# Patient Record
Sex: MALE | Race: WHITE | NOT HISPANIC OR LATINO | Employment: FULL TIME | URBAN - METROPOLITAN AREA
[De-identification: names, ages, dates, MRNs, and addresses within clinical notes are randomized per-mention and may not be internally consistent; named-entity substitution may affect disease eponyms.]

---

## 2017-08-31 ENCOUNTER — ALLSCRIPTS OFFICE VISIT (OUTPATIENT)
Dept: OTHER | Facility: OTHER | Age: 47
End: 2017-08-31

## 2017-08-31 DIAGNOSIS — Z91.89 OTHER SPECIFIED PERSONAL RISK FACTORS, NOT ELSEWHERE CLASSIFIED: ICD-10-CM

## 2017-08-31 DIAGNOSIS — R05.9 COUGH: ICD-10-CM

## 2017-09-03 ENCOUNTER — HOSPITAL ENCOUNTER (OUTPATIENT)
Dept: CT IMAGING | Facility: HOSPITAL | Age: 47
Discharge: HOME | End: 2017-09-03
Payer: COMMERCIAL

## 2017-09-03 DIAGNOSIS — R05.9 COUGH: ICD-10-CM

## 2017-09-03 DIAGNOSIS — Z91.89 OTHER SPECIFIED PERSONAL RISK FACTORS, NOT ELSEWHERE CLASSIFIED: ICD-10-CM

## 2017-09-06 ENCOUNTER — GENERIC CONVERSION - ENCOUNTER (OUTPATIENT)
Dept: OTHER | Facility: OTHER | Age: 47
End: 2017-09-06

## 2017-11-24 ENCOUNTER — GENERIC CONVERSION - ENCOUNTER (OUTPATIENT)
Dept: OTHER | Facility: OTHER | Age: 47
End: 2017-11-24

## 2017-11-24 DIAGNOSIS — M79.661 PAIN OF RIGHT LOWER LEG: ICD-10-CM

## 2018-01-10 NOTE — PROGRESS NOTES
Assessment    1  Encounter for preventive health examination (V70 0) (Z00 00)   2  Anxiety and depression (300 00,311) (F41 9,F32 9)    Plan  Abnormal blood sugar    · (1) CBC/PLT/DIFF; Status:Active; Requested for:56Mcp7913; Abnormal blood sugar, Benign essential hypertension, Dyslipidemia, Hypothyroidism    · (1) HEMOGLOBIN A1C; Status:Active; Requested for:07Fwp3307; Anxiety and depression    · Decreasing the stress in your life may help your condition improve ; Status:Complete;    Done: 92MUX3399   · There are many things you can do to help control and end a panic attack ;  Status:Complete;   Done: 56KTQ3802  Benign essential hypertension    · Metoprolol Succinate ER 50 MG Oral Tablet Extended Release 24 Hour; TAKE ONE  TABLET BY MOUTH ONCE DAILY  Health Maintenance    · There are ways to decrease your stress and improve your sense of well-being  We  encourage you to keep active and exercise regularly  Make time to take care of yourself  and participate in activities that you enjoy  Stay connected to friends and family that can  support and comfort you  If at any time you have thoughts of harming yourself or  someone else, contact us immediately ; Status:Active; Requested for:82Ehh9947;    · We encourage all of our patients to exercise regularly  30 minutes of exercise or physical  activity five or more days a week is recommended for children and adults ;  Status:Complete;   Done: 26SWZ9185   · We encourage you to begin to make lifestyle changes to help control your blood  pressure    These may include losing weight, increasing your activity level, limiting salt in  your diet, decreasing alcohol intake, and eating a diet low in fat and rich in fruits  and vegetables ; Status:Complete;   Done: 25ILN5821   · Follow-up visit in 2 weeks Evaluation and Treatment  Follow-up  Status: Hold For -  Scheduling  Requested for: 91QTH3680   · Stop: Fluzone Quadrivalent Intramuscular Suspension  Hypothyroidism    · Levothyroxine Sodium 50 MCG Oral Tablet; 1 po q daily in am empty stomach    Discussion/Summary  Impression: health maintenance visit  Currently, he eats a poor diet and has an inadequate exercise regimen  Prostate cancer screening: the risks and benefits of prostate cancer screening were discussed and PSA is not indicated  Testicular cancer screening: the risks and benefits of testicular cancer screening were discussed and monthly self testicular exam was advised  Colorectal cancer screening: colorectal cancer screening is not indicated  The patient declines immunizations  Advice and education were given regarding nutrition, aerobic exercise, weight loss, calcium supplements, vitamin D supplements and cardiovascular risk reduction  Patient will follow-up in 2-3 weeks for review of labs  He was offered medications for symptoms of anxiety and depression, but patient states that this is situational and he does not want to be on any medications as of now  He will consider medications if his symptoms persist  He was encouraged to eat healthy and doing exercise on a regular basis for at least 30 minutes per day, this will help with his blood pressure control, and his mood  His blood pressure is elevated today, so I advised him to increase metoprolol to 50 mg daily  Possible side effects of new medications were reviewed with the patient/guardian today  The patient was counseled regarding instructions for management, risk factor reductions, patient and family education, impressions, importance of compliance with treatment  Chief Complaint  preventative      History of Present Illness  HM, Adult Male: The patient is being seen for a health maintenance evaluation  The last health maintenance visit was 2 year(s) ago  Social History: Household members include spouse  General Health: The patient's health since the last visit is described as fair  He has regular dental visits  He denies vision problems   He denies hearing loss  Immunizations status:  refuses flu vaccine  Lifestyle:  He consumes a diverse and healthy diet  He has weight concerns  He does not exercise regularly  Screening: Prostate cancer screening includes no previous evaluation  Testicular cancer screening includes monthly self testicular examinations  Colorectal cancer screening includes no previous screening  metabolic screening reviewed and current  HPI: Patient is here for annual physical  He is emotionally upset because he is going through a divorce  He recently lost his father, and states that his mother is suffering from lung cancer  Patient is due for labs, but is requesting a short supply of the medication to be called into his local pharmacy, since he is low on the medications  Review of Systems    Constitutional: as noted in HPI  Eyes: No complaints of eye pain, no red eyes, no discharge from eyes, no itchy eyes  ENT: no complaints of earache, no hearing loss, no nosebleeds, no nasal discharge, no sore throat, no hoarseness  Cardiovascular: No complaints of slow heart rate, no fast heart rate, no chest pain, no palpitations, no leg claudication, no lower extremity  Respiratory: No complaints of shortness of breath, no wheezing, no cough, no SOB on exertion, no orthopnea or PND  Gastrointestinal: No complaints of abdominal pain, no constipation, no nausea or vomiting, no diarrhea or bloody stools  Genitourinary: No complaints of dysuria, no incontinence, no hesitancy, no nocturia, no genital lesion, no testicular pain  Integumentary: No complaints of skin rash or skin lesions, no itching, no skin wound, no dry skin  Neurological: No compliants of headache, no confusion, no convulsions, no numbness or tingling, no dizziness or fainting, no limb weakness, no difficulty walking  Psychiatric: as noted in HPI     Endocrine: No complaints of proptosis, no hot flashes, no muscle weakness, no erectile dysfunction, no deepening of the voice, no feelings of weakness  Hematologic/Lymphatic: No complaints of swollen glands, no swollen glands in the neck, does not bleed easily, no easy bruising  Active Problems    1  Benign essential hypertension (401 1) (I10)   2  Dyslipidemia (272 4) (E78 5)   3  Hypothyroidism (244 9) (E03 9)   4  Irritable bowel syndrome (564 1) (K58 9)   5  Nasal congestion (478 19) (R09 81)   6  Obesity (278 00) (E66 9)   7  Well adult on routine health check (V70 0) (Z00 00)    Past Medical History    · History of Acute reaction to stress (308 9) (F43 0)   · Acute upper respiratory infection (465 9) (J06 9)   · History of Blood pressure elevated (401 9) (I10)   · History of acute bronchitis (V12 69) (Z87 09)   · History of Nonspecific abnormal results of function study of thyroid (794 5) (R94 6)   · History of Special screening examination for neoplasm of prostate (V76 44) (Z12 5)    Family History  Mother    · Family history of Emphysema   · Family history of Lung Cancer (V16 1)  Father    · Family history of Dementia   · Family history of Hyperlipidemia   · Family history of Hypertension (V17 49)   · Family history of Type 2 Diabetes Mellitus  Sister    · Family history of Thyroid Cancer    Social History    · Being A Social Drinker   · Denied: History of Drug Use   · Never A Smoker    Current Meds   1  Levothyroxine Sodium 50 MCG Oral Tablet; take 1 tablet by mouth every morning; Therapy: 09GXC1040 to (Evaluate:35Abx2384)  Requested for: 46BDD4024; Last   Rx:[id number] Ordered    Allergies    1  No Known Drug Allergies    Vitals   Recorded: 02Dec2016 08:04AM   Heart Rate 70   Respiration 16   Systolic 857   Diastolic 90   Height 5 ft 11 in   Weight 307 lb    BMI Calculated 42 82   BSA Calculated 2 52     Physical Exam    Constitutional   General appearance: No acute distress, well appearing and well nourished      Ears, Nose, Mouth, and Throat   Otoscopic examination: Tympanic membranes translucent with normal light reflex  Canals patent without erythema  Oropharynx: Normal with no erythema, edema, exudate or lesions  Neck   Thyroid: Normal, no thyromegaly  Pulmonary   Auscultation of lungs: Clear to auscultation  Cardiovascular   Auscultation of heart: Normal rate and rhythm, normal S1 and S2, no murmurs  Examination of extremities for edema and/or varicosities: Normal     Lymphatic   Palpation of lymph nodes in neck: No lymphadenopathy  Neurologic   Cortical function: Normal mental status  Coordination: Normal finger to nose and heel to shin  Psychiatric   Orientation to person, place and time: Normal     Mood and affect: Normal        Results/Data  PHQ-2 Adult Depression Screening [id number] 08:11AM User, [employer]     Test Name Result Flag Reference   PHQ-2 Adult Depression Score 1     Over the last two weeks, how often have you been bothered by any of the following problems?   Little interest or pleasure in doing things: Not at all - 0  Feeling down, depressed, or hopeless: Several days - 1   PHQ-2 Adult Depression Screening Negative         Procedure    Procedure:   Results: 20/20 in the right eye with corrective device, 20/20 in the left eye with corrective device      Future Appointments    Date/Time Provider Specialty Site   12/22/2016 05:40 PM [facility]   Electronically signed by : Rina Epperson MD; Dec  2 2016 10:33AM EST                       (Author)

## 2018-01-10 NOTE — RESULT NOTES
Verified Results  (1) CBC/PLT/DIFF [id number] 08:51AM Tena Hammond     Test Name Result Flag Reference   WHITE BLOOD CELL COUNT 6 4 Thousand/uL  3 8-10 8   RED BLOOD CELL COUNT 4 66 Million/uL  4 20-5 80   HEMOGLOBIN 14 2 g/dL  13 2-17 1   HEMATOCRIT 42 7 %  38 5-50 0   MCV 91 6 fL  80 0-100 0   MCH 30 5 pg  27 0-33 0   MCHC 33 3 g/dL  32 0-36 0   RDW 12 9 %  11 0-15 0   PLATELET COUNT 460 Thousand/uL  140-400   MPV 8 6 fL  7 5-11 5   ABSOLUTE NEUTROPHILS 4026 cells/uL  6130-9799   ABSOLUTE LYMPHOCYTES 1907 cells/uL  850-3900   ABSOLUTE MONOCYTES 294 cells/uL  200-950   ABSOLUTE EOSINOPHILS 141 cells/uL     ABSOLUTE BASOPHILS 32 cells/uL  0-200   NEUTROPHILS 62 9 %     LYMPHOCYTES 29 8 %     MONOCYTES 4 6 %     EOSINOPHILS 2 2 %     BASOPHILS 0 5 %       (1) LIPID PANEL, FASTING [id number] 08:51AM Tena Hammond     Test Name Result Flag Reference   CHOLESTEROL, TOTAL 173 mg/dL  125-200   HDL CHOLESTEROL 29 mg/dL L > OR = 40   TRIGLICERIDES 432 mg/dL  <150   LDL-CHOLESTEROL 121 mg/dL (calc)  <130   Desirable range <100 mg/dL for patients with CHD or  diabetes and <70 mg/dL for diabetic patients with  known heart disease  CHOL/HDLC RATIO 6 0 (calc) H < OR = 5 0   NON HDL CHOLESTEROL 144 mg/dL (calc)     Target for non-HDL cholesterol is 30 mg/dL higher than   LDL cholesterol target  (1) COMPREHENSIVE METABOLIC PANEL [id number] 87:98IX Tena Hammond     Test Name Result Flag Reference   GLUCOSE 88 mg/dL  65-99   Fasting reference interval   UREA NITROGEN (BUN) 23 mg/dL  7-25   CREATININE 0 84 mg/dL  0 60-1 35   eGFR NON-AFR   AMERICAN 104 mL/min/1 73m2  > OR = 60   eGFR AFRICAN AMERICAN 121 mL/min/1 73m2  > OR = 60   BUN/CREATININE RATIO   1-13   NOT APPLICABLE (calc)   SODIUM 140 mmol/L  135-146   POTASSIUM 4 2 mmol/L  3 5-5 3   CHLORIDE 105 mmol/L     CARBON DIOXIDE 31 mmol/L  20-31   CALCIUM 8 8 mg/dL  8 6-10 3   PROTEIN, TOTAL 6 6 g/dL  6 1-8 1   ALBUMIN 3 7 g/dL  3 6-5 1   GLOBULIN 2 9 g/dL (calc)  1 9-3 7   ALBUMIN/GLOBULIN RATIO 1 3 (calc)  1 0-2 5   BILIRUBIN, TOTAL 0 8 mg/dL  0 2-1 2   ALKALINE PHOSPHATASE 77 U/L     AST 18 U/L  10-40   ALT 28 U/L  9-46     (1) T4, FREE [id number] 08:51AM Tena Hammond     Test Name Result Flag Reference   T4, FREE 1 1 ng/dL  0 8-1 8     (Q) TSH, 3RD GENERATION [id number] 08:51AM Tena Hammond     Test Name Result Flag Reference   TSH 3 80 mIU/L  0 40-4 50     (Q) HEMOGLOBIN A1c [id number] 08:51AM Tena Hammond   REPORT COMMENT:  FASTING:YES     Test Name Result Flag Reference   HEMOGLOBIN A1c 5 4 % of total Hgb  <5 7   According to ADA guidelines, hemoglobin A1c <7 0%  represents optimal control in non-pregnant diabetic  patients  Different metrics may apply to specific  patient populations  Standards of Medical Care in  [phone]  Diabetes Care  2013;36:s11-s66     For the purpose of screening for the presence of  diabetes  <5 7%       Consistent with the absence of diabetes  5 7-6 4%    Consistent with increased risk for diabetes              (prediabetes)  >or=6 5%    Consistent with diabetes     This assay result is consistent with a decreased risk  of diabetes  Currently, no consensus exists for use of hemoglobin  A1c for diagnosis of diabetes for children

## 2018-01-13 VITALS
RESPIRATION RATE: 17 BRPM | SYSTOLIC BLOOD PRESSURE: 132 MMHG | DIASTOLIC BLOOD PRESSURE: 84 MMHG | OXYGEN SATURATION: 98 % | WEIGHT: 306 LBS | HEART RATE: 85 BPM | HEIGHT: 71 IN | BODY MASS INDEX: 42.84 KG/M2

## 2018-01-13 NOTE — RESULT NOTES
Verified Results  (1) CBC/PLT/DIFF [id number] 07:37AM Silverio Handy   REPORT COMMENT:  REQ # [id number]  FASTING:YES     Test Name Result Flag Reference   WHITE BLOOD CELL COUNT 5 6 Thousand/uL  3 8-10 8   RED BLOOD CELL COUNT 4 67 Million/uL  4 20-5 80   HEMOGLOBIN 13 9 g/dL  13 2-17 1   HEMATOCRIT 42 4 %  38 5-50 0   MCV 90 8 fL  80 0-100 0   MCH 29 8 pg  27 0-33 0   MCHC 32 8 g/dL  32 0-36 0   RDW 13 0 %  11 0-15 0   PLATELET COUNT 961 Thousand/uL  140-400   MPV 8 1 fL  7 5-11 5   ABSOLUTE NEUTROPHILS 3214 cells/uL  4199-3001   ABSOLUTE LYMPHOCYTES 1926 cells/uL  850-3900   ABSOLUTE MONOCYTES 286 cells/uL  200-950   ABSOLUTE EOSINOPHILS 168 cells/uL     ABSOLUTE BASOPHILS 6 cells/uL  0-200   NEUTROPHILS 57 4 %     LYMPHOCYTES 34 4 %     MONOCYTES 5 1 %     EOSINOPHILS 3 0 %     BASOPHILS 0 1 %       (1) COMPREHENSIVE METABOLIC PANEL [id number] 98:52YF Silverio Handy     Test Name Result Flag Reference   GLUCOSE 88 mg/dL  65-99   Fasting reference interval   UREA NITROGEN (BUN) 18 mg/dL  7-25   CREATININE 0 95 mg/dL  0 60-1 35   eGFR NON-AFR   AMERICAN 95 mL/min/1 73m2  > OR = 60   eGFR AFRICAN AMERICAN 110 mL/min/1 73m2  > OR = 60   BUN/CREATININE RATIO   5-85   NOT APPLICABLE (calc)   SODIUM 138 mmol/L  135-146   POTASSIUM 4 1 mmol/L  3 5-5 3   CHLORIDE 104 mmol/L     CARBON DIOXIDE 26 mmol/L  19-30   CALCIUM 8 9 mg/dL  8 6-10 3   PROTEIN, TOTAL 6 4 g/dL  6 1-8 1   ALBUMIN 3 7 g/dL  3 6-5 1   GLOBULIN 2 7 g/dL (calc)  1 9-3 7   ALBUMIN/GLOBULIN RATIO 1 4 (calc)  1 0-2 5   BILIRUBIN, TOTAL 0 6 mg/dL  0 2-1 2   ALKALINE PHOSPHATASE 70 U/L     AST 19 U/L  10-40   ALT 33 U/L  9-46     (1) LIPID PANEL, FASTING [id number] 07:37AM Silverio Handy     Test Name Result Flag Reference   CHOLESTEROL, TOTAL 159 mg/dL  125-200   HDL CHOLESTEROL 29 mg/dL L > OR = 40   TRIGLICERIDES 773 mg/dL  <150   LDL-CHOLESTEROL 108 mg/dL (calc)  <130   Desirable range <100 mg/dL for patients with CHD or  diabetes and <70 mg/dL for diabetic patients with  known heart disease  CHOL/HDLC RATIO 5 5 (calc) H < OR = 5 0   NON HDL CHOLESTEROL 130 mg/dL (calc)     Target for non-HDL cholesterol is 30 mg/dL higher than   LDL cholesterol target  (Q) MICROALBUMIN, RANDOM URINE (W/CREATININE) [id number] 07:37AM Yogi Ortega     Test Name Result Flag Reference   CREATININE, RANDOM URINE 208 mg/dL     MICROALBUMIN 0 4 mg/dL     Reference Range  Not established   MICROALBUMIN/CREATININE$RATIO, RANDOM URINE 2 mcg/mg creat  <30   The ADA defines abnormalities in albumin  excretion as follows:     Category         Result (mcg/mg creatinine)     Normal                    <30  Microalbuminuria            Clinical albuminuria   > OR = 300     The ADA recommends that at least two of three  specimens collected within a 3-6 month period be  abnormal before considering a patient to be  within a diagnostic category  Plan  Benign essential hypertension, Dyslipidemia, Hypothyroidism    · (1) COMPREHENSIVE METABOLIC PANEL; Status:Active; Requested for:01Nov2016;    · (1) LIPID PANEL, FASTING; Status:Active; Requested for:01Nov2016;    · (1) T4, FREE; Status:Active; Requested for:01Nov2016;    · (1) TSH; Status:Active; Requested for:01Nov2016;     Discussion/Summary   Essentially normal labs  Refill of metoprolol and levothyroxine sent to pharmacy  Please follow-up with Dr Don Colin in 6 months with labs   See order

## 2018-01-14 NOTE — RESULT NOTES
Verified Results  * CT LUNG SCREENING PROGRAM [id number] 01:46PM [place] [facility] Order Number: [id number]    - Patient Instructions: To schedule this appointment, please contact Central Scheduling at [phone]  Test Name Result Flag Reference   CT LUNG SCREENING PROGRAM (Report)     CT CHEST LUNG CANCER SCREENING WITHOUT IV CONTRAST     INDICATION: R05: Cough   Z91 89: Other specified personal risk factors, not elsewhere classified  History taken directly from the electronic ordering system  COMPARISON: None  TECHNIQUE: Unenhanced CT examination of the chest was performed utilizing a low dose protocol  Reformatted images were created in axial, sagittal, and coronal planes  Radiation dose length product (DLP) for this visit: 397 mGy-cm   This examination, like all CT scans performed in the [facility], was performed utilizing techniques to minimize radiation dose exposure, including the use of iterative    reconstruction and automated exposure control  FINDINGS:     LUNGS: There are a few scattered bilateral punctate 1 mm calcified pulmonary nodules  No pulmonary mass  No tracheal or endobronchial lesion  PLEURA: Unremarkable  HEART/GREAT VESSELS: Unremarkable for patient's age  MEDIASTINUM AND FABIAN: Unremarkable  CHEST WALL AND LOWER NECK:    Normal      VISUALIZED STRUCTURES IN THE UPPER ABDOMEN: Unremarkable  OSSEOUS STRUCTURES: No acute fracture  No destructive osseous lesion  IMPRESSION:     A few scattered bilateral 1 mm calcified pulmonary nodules  Lung-RADS: Lung-RADS2, benign appearance or behavior  Continue annual screening with LDCT in 12 months               Workstation performed: [id number]     Signed by:   Adolfo Reinoso MD   9/5/17

## 2018-01-22 VITALS
WEIGHT: 307 LBS | SYSTOLIC BLOOD PRESSURE: 140 MMHG | DIASTOLIC BLOOD PRESSURE: 84 MMHG | BODY MASS INDEX: 42.98 KG/M2 | HEIGHT: 71 IN | HEART RATE: 92 BPM

## 2018-01-31 DIAGNOSIS — I10 ESSENTIAL (PRIMARY) HYPERTENSION: ICD-10-CM

## 2018-01-31 DIAGNOSIS — E03.9 HYPOTHYROIDISM: ICD-10-CM

## 2018-03-08 PROBLEM — F43.22 ADJUSTMENT DISORDER WITH ANXIETY: Status: ACTIVE | Noted: 2017-08-31

## 2018-03-08 RX ORDER — HYDROCHLOROTHIAZIDE 12.5 MG/1
CAPSULE, GELATIN COATED ORAL
COMMUNITY
Start: 2016-12-22 | End: 2018-04-06 | Stop reason: SDUPTHER

## 2018-03-08 RX ORDER — LEVOTHYROXINE SODIUM 0.05 MG/1
1 TABLET ORAL DAILY
COMMUNITY
Start: 2013-06-13 | End: 2018-04-06

## 2018-03-08 RX ORDER — METOPROLOL SUCCINATE 50 MG/1
1 TABLET, EXTENDED RELEASE ORAL DAILY
COMMUNITY
Start: 2016-12-02 | End: 2018-04-06 | Stop reason: SDUPTHER

## 2018-04-04 LAB
ALBUMIN SERPL-MCNC: 4.1 G/DL (ref 3.5–5.5)
ALBUMIN/CREAT UR: <4 MG/G CREAT (ref 0–30)
ALBUMIN/GLOB SERPL: 1.3 {RATIO} (ref 1.2–2.2)
ALP SERPL-CCNC: 82 IU/L (ref 39–117)
ALT SERPL-CCNC: 38 IU/L (ref 0–44)
AMBIG ABBREV DEFAULT: NORMAL
AMBIG ABBREV DEFAULT: NORMAL
AST SERPL-CCNC: 25 IU/L (ref 0–40)
BASOPHILS # BLD AUTO: 0 X10E3/UL (ref 0–0.2)
BASOPHILS NFR BLD AUTO: 0 %
BILIRUB SERPL-MCNC: 0.6 MG/DL (ref 0–1.2)
BUN SERPL-MCNC: 23 MG/DL (ref 6–24)
BUN/CREAT SERPL: 26 (ref 9–20)
CALCIUM SERPL-MCNC: 9 MG/DL (ref 8.7–10.2)
CHLORIDE SERPL-SCNC: 100 MMOL/L (ref 96–106)
CHOLEST SERPL-MCNC: 192 MG/DL (ref 100–199)
CO2 SERPL-SCNC: 27 MMOL/L (ref 18–29)
CREAT SERPL-MCNC: 0.9 MG/DL (ref 0.76–1.27)
CREAT UR-MCNC: 74.9 MG/DL
EOSINOPHIL # BLD AUTO: 0.1 X10E3/UL (ref 0–0.4)
EOSINOPHIL NFR BLD AUTO: 2 %
ERYTHROCYTE [DISTWIDTH] IN BLOOD BY AUTOMATED COUNT: 13.5 % (ref 12.3–15.4)
GLOBULIN SER-MCNC: 3.1 G/DL (ref 1.5–4.5)
GLUCOSE SERPL-MCNC: 97 MG/DL (ref 65–99)
HBA1C MFR BLD: 5.2 % (ref 4.8–5.6)
HCT VFR BLD AUTO: 44.2 % (ref 37.5–51)
HDLC SERPL-MCNC: 33 MG/DL
HGB BLD-MCNC: 15.2 G/DL (ref 13–17.7)
IMM GRANULOCYTES # BLD: 0 X10E3/UL (ref 0–0.1)
IMM GRANULOCYTES NFR BLD: 0 %
LDLC SERPL CALC-MCNC: 121 MG/DL (ref 0–99)
LYMPHOCYTES # BLD AUTO: 1.7 X10E3/UL (ref 0.7–3.1)
LYMPHOCYTES NFR BLD AUTO: 23 %
MCH RBC QN AUTO: 31.1 PG (ref 26.6–33)
MCHC RBC AUTO-ENTMCNC: 34.4 G/DL (ref 31.5–35.7)
MCV RBC AUTO: 90 FL (ref 79–97)
MICROALBUMIN UR-MCNC: <3 UG/ML
MONOCYTES # BLD AUTO: 0.4 X10E3/UL (ref 0.1–0.9)
MONOCYTES NFR BLD AUTO: 5 %
NEUTROPHILS # BLD AUTO: 5 X10E3/UL (ref 1.4–7)
NEUTROPHILS NFR BLD AUTO: 70 %
PLATELET # BLD AUTO: 259 X10E3/UL (ref 150–379)
POTASSIUM SERPL-SCNC: 4.3 MMOL/L (ref 3.5–5.2)
PROT SERPL-MCNC: 7.2 G/DL (ref 6–8.5)
RBC # BLD AUTO: 4.89 X10E6/UL (ref 4.14–5.8)
SL AMB EGFR AFRICAN AMERICAN: 116 ML/MIN/1.73
SL AMB EGFR NON AFRICAN AMERICAN: 100 ML/MIN/1.73
SL AMB VLDL CHOLESTEROL CALC: 38 MG/DL (ref 5–40)
SODIUM SERPL-SCNC: 139 MMOL/L (ref 134–144)
T4 FREE SERPL-MCNC: 1.1 NG/DL (ref 0.82–1.77)
TRIGL SERPL-MCNC: 188 MG/DL (ref 0–149)
TSH SERPL DL<=0.005 MIU/L-ACNC: 5.08 UIU/ML (ref 0.45–4.5)
WBC # BLD AUTO: 7.2 X10E3/UL (ref 3.4–10.8)

## 2018-04-06 ENCOUNTER — OFFICE VISIT (OUTPATIENT)
Dept: FAMILY MEDICINE CLINIC | Facility: CLINIC | Age: 48
End: 2018-04-06
Payer: COMMERCIAL

## 2018-04-06 VITALS
SYSTOLIC BLOOD PRESSURE: 138 MMHG | HEIGHT: 71 IN | WEIGHT: 315 LBS | DIASTOLIC BLOOD PRESSURE: 82 MMHG | BODY MASS INDEX: 44.1 KG/M2 | OXYGEN SATURATION: 96 % | HEART RATE: 81 BPM

## 2018-04-06 DIAGNOSIS — E78.5 DYSLIPIDEMIA: ICD-10-CM

## 2018-04-06 DIAGNOSIS — IMO0001 CLASS 3 OBESITY DUE TO EXCESS CALORIES WITHOUT SERIOUS COMORBIDITY WITH BODY MASS INDEX (BMI) OF 40.0 TO 44.9 IN ADULT: ICD-10-CM

## 2018-04-06 DIAGNOSIS — F43.22 ADJUSTMENT DISORDER WITH ANXIETY: ICD-10-CM

## 2018-04-06 DIAGNOSIS — C61 PROSTATE CANCER (HCC): ICD-10-CM

## 2018-04-06 DIAGNOSIS — E03.9 HYPOTHYROIDISM, UNSPECIFIED TYPE: Primary | ICD-10-CM

## 2018-04-06 DIAGNOSIS — I10 BENIGN ESSENTIAL HYPERTENSION: ICD-10-CM

## 2018-04-06 PROCEDURE — 3725F SCREEN DEPRESSION PERFORMED: CPT | Performed by: FAMILY MEDICINE

## 2018-04-06 PROCEDURE — 3075F SYST BP GE 130 - 139MM HG: CPT | Performed by: FAMILY MEDICINE

## 2018-04-06 PROCEDURE — 99214 OFFICE O/P EST MOD 30 MIN: CPT | Performed by: FAMILY MEDICINE

## 2018-04-06 PROCEDURE — 3079F DIAST BP 80-89 MM HG: CPT | Performed by: FAMILY MEDICINE

## 2018-04-06 RX ORDER — METOPROLOL SUCCINATE 50 MG/1
50 TABLET, EXTENDED RELEASE ORAL DAILY
Qty: 90 TABLET | Refills: 1 | Status: SHIPPED | OUTPATIENT
Start: 2018-04-06 | End: 2018-07-11 | Stop reason: SDUPTHER

## 2018-04-06 RX ORDER — LEVOTHYROXINE SODIUM 0.07 MG/1
75 TABLET ORAL DAILY
Qty: 90 TABLET | Refills: 0 | Status: SHIPPED | OUTPATIENT
Start: 2018-04-06 | End: 2018-07-11 | Stop reason: SDUPTHER

## 2018-04-06 RX ORDER — HYDROCHLOROTHIAZIDE 12.5 MG/1
12.5 CAPSULE, GELATIN COATED ORAL EVERY MORNING
Qty: 90 CAPSULE | Refills: 1 | Status: SHIPPED | OUTPATIENT
Start: 2018-04-06 | End: 2018-07-11 | Stop reason: SDUPTHER

## 2018-04-06 RX ORDER — FLUOXETINE 20 MG/1
20 TABLET, FILM COATED ORAL DAILY
Qty: 90 TABLET | Refills: 0 | Status: SHIPPED | OUTPATIENT
Start: 2018-04-06 | End: 2018-07-26

## 2018-04-06 NOTE — PROGRESS NOTES
Subjective:      Patient ID: Smith Carlson is a 50 y o  male  Hypertension   This is a chronic problem  The current episode started more than 1 year ago  The problem has been gradually improving since onset  The problem is controlled  Associated symptoms include anxiety  Pertinent negatives include no chest pain, malaise/fatigue, orthopnea, palpitations, peripheral edema, PND or shortness of breath  There are no associated agents to hypertension  Treatments tried: Hydrochlorothiazide 12 5 milligram, metoprolol XL 50 milligram daily  The current treatment provides significant improvement  There are no compliance problems  Hypertensive end-organ damage includes a thyroid problem  Hyperlipidemia   This is a new problem  This is a new diagnosis  Lipid results: ,   Pertinent negatives include no chest pain, myalgias or shortness of breath  He is currently on no antihyperlipidemic treatment  Compliance problems include adherence to diet and adherence to exercise  Risk factors for coronary artery disease include dyslipidemia, hypertension, male sex, obesity and a sedentary lifestyle  Thyroid Problem   Presents for follow-up visit  Symptoms include anxiety and weight gain  Patient reports no depressed mood, hair loss, heat intolerance, palpitations or tremors  Symptom course: TSH 5 0 on 50 microgram of levothyroxine  His past medical history is significant for hyperlipidemia  Anxiety   Presents for follow-up visit  Symptoms include decreased concentration, excessive worry, irritability, nervous/anxious behavior, panic, restlessness and suicidal ideas  Patient reports no chest pain, depressed mood, insomnia, palpitations or shortness of breath  Symptoms occur occasionally  The severity of symptoms is moderate  The quality of sleep is good  Nighttime awakenings: none  No past medical history on file      Family History   Problem Relation Age of Onset    Emphysema Mother     Bone cancer Mother     Breast cancer Mother     Lung cancer Mother     Dementia Father     Stroke Father     Hyperlipidemia Father     Hypertension Father     Diabetes type II Father     Thyroid cancer Sister        No past surgical history on file  reports that he has never smoked  He has never used smokeless tobacco  He reports that he drinks alcohol  He reports that he does not use drugs  Current Outpatient Prescriptions:     hydrochlorothiazide (MICROZIDE) 12 5 mg capsule, Take 1 capsule (12 5 mg total) by mouth every morning, Disp: 90 capsule, Rfl: 1    metoprolol succinate (TOPROL-XL) 50 mg 24 hr tablet, Take 1 tablet (50 mg total) by mouth daily, Disp: 90 tablet, Rfl: 1    FLUoxetine (PROzac) 20 MG tablet, Take 1 tablet (20 mg total) by mouth daily, Disp: 90 tablet, Rfl: 0    levothyroxine 75 mcg tablet, Take 1 tablet (75 mcg total) by mouth daily, Disp: 90 tablet, Rfl: 0    The following portions of the patient's history were reviewed and updated as appropriate: allergies, current medications, past family history, past medical history, past social history, past surgical history and problem list     Review of Systems   Constitutional: Positive for irritability and weight gain  Negative for malaise/fatigue  Respiratory: Negative  Negative for shortness of breath  Cardiovascular: Negative  Negative for chest pain, palpitations, orthopnea and PND  Gastrointestinal: Negative  Endocrine: Negative for heat intolerance  Musculoskeletal: Negative  Negative for myalgias  Neurological: Negative  Negative for tremors  Psychiatric/Behavioral: Positive for decreased concentration and suicidal ideas  The patient is nervous/anxious  The patient does not have insomnia  Objective:    /82   Pulse 81   Ht 5' 11" (1 803 m)   Wt (!) 143 kg (315 lb)   SpO2 96%   BMI 43 93 kg/m²      Physical Exam   Constitutional: He is oriented to person, place, and time   He appears well-developed and well-nourished  Cardiovascular: Normal rate, regular rhythm and normal heart sounds  Pulmonary/Chest: Effort normal and breath sounds normal    Abdominal: Soft  Bowel sounds are normal    Neurological: He is alert and oriented to person, place, and time  Psychiatric: His behavior is normal  Judgment normal    Tearful         Recent Results (from the past 1008 hour(s))   CBC and differential    Collection Time: 04/03/18  7:14 AM   Result Value Ref Range    SL AMB LAB WHITE BLOOD CELL COUNT 7 2 3 4 - 10 8 x10E3/uL    SL AMB LAB RED BLOOD CELLS 4 89 4 14 - 5 80 x10E6/uL    Hemoglobin 15 2 13 0 - 17 7 g/dL    Hematocrit 44 2 37 5 - 51 0 %    MCV 90 79 - 97 fL    MCH 31 1 26 6 - 33 0 pg    MCHC 34 4 31 5 - 35 7 g/dL    RDW 13 5 12 3 - 15 4 %    Platelet Count 160 061 - 379 x10E3/uL    Neutrophils 70 Not Estab  %    Lymphocytes 23 Not Estab  %    Monocytes 5 Not Estab  %    Eosinophils 2 Not Estab  %    Basophils 0 Not Estab  %    Neutrophils (Absolute) 5 0 1 4 - 7 0 x10E3/uL    Lymphocytes (Absolute) 1 7 0 7 - 3 1 x10E3/uL    Monocytes (Absolute) 0 4 0 1 - 0 9 x10E3/uL    Eosinophils (Absolute) 0 1 0 0 - 0 4 x10E3/uL    Basophils (Absolute) 0 0 0 0 - 0 2 x10E3/uL    IMM  GRANULOCYTES (CD4/8) 0 Not Estab  %    IIMM  GRANS,ABS (CD4/8) 0 0 0 0 - 0 1 x10E3/uL   Comprehensive metabolic panel    Collection Time: 04/03/18  7:14 AM   Result Value Ref Range    SL AMB GLUCOSE 97 65 - 99 mg/dL    BUN 23 6 - 24 mg/dL    Creatinine, Serum 0 90 0 76 - 1 27 mg/dL    eGFR Non African American 100 >59 mL/min/1 73    SL AMB EGFR AFRICAN AMERICAN 116 >59 mL/min/1 73    SL AMB BUN/CREATININE RATIO 26 (H) 9 - 20    SL AMB SODIUM 139 134 - 144 mmol/L    SL AMB POTASSIUM 4 3 3 5 - 5 2 mmol/L    SL AMB CHLORIDE 100 96 - 106 mmol/L    SL AMB CARBON DIOXIDE 27 18 - 29 mmol/L    CALCIUM 9 0 8 7 - 10 2 mg/dL    SL AMB PROTEIN, TOTAL 7 2 6 0 - 8 5 g/dL    Serum Albumin 4 1 3 5 - 5 5 g/dL    Globulin, Total 3 1 1 5 - 4 5 g/dL SL AMB ALBUMIN/GLOBULIN RATIO 1 3 1 2 - 2 2    SL AMB BILIRUBIN, TOTAL 0 6 0 0 - 1 2 mg/dL    Alk Phos Isoenzymes 82 39 - 117 IU/L    SL AMB AST 25 0 - 40 IU/L    SL AMB ALT 38 0 - 44 IU/L   Lipid panel    Collection Time: 04/03/18  7:14 AM   Result Value Ref Range    Cholesterol, Total 192 100 - 199 mg/dL    Triglycerides 188 (H) 0 - 149 mg/dL    SL AMB HDL CHOLESTEROL 33 (L) >39 mg/dL    SL AMB VLDL CHOLESTEROL CALC 38 5 - 40 mg/dL    SL AMB LDL-CHOLESTEROL 121 (H) 0 - 99 mg/dL   Microalbumin / creatinine urine ratio    Collection Time: 04/03/18  7:14 AM   Result Value Ref Range    Creatinine, Urine 74 9 Not Estab  mg/dL    Microalbum  ,U,Random <3 0 Not Estab  ug/mL    Microalb/Creat Ratio <4 0 0 0 - 30 0 mg/g creat   Hemoglobin A1c    Collection Time: 04/03/18  7:14 AM   Result Value Ref Range    Hemoglobin A1C 5 2 4 8 - 5 6 %   T4, free    Collection Time: 04/03/18  7:14 AM   Result Value Ref Range    Free t4 1 10 0 82 - 1 77 ng/dL   TSH, 3rd generation    Collection Time: 04/03/18  7:14 AM   Result Value Ref Range    TSH 5 080 (H) 0 450 - 4 500 uIU/mL   Ambig Abbrev Default    Collection Time: 04/03/18  7:14 AM   Result Value Ref Range    AMBIG ABBREV DEFAULT Comment    Ambig Abbrev Default    Collection Time: 04/03/18  7:14 AM   Result Value Ref Range    AMBIG ABBREV DEFAULT Comment        Assessment/Plan:    Patient's blood pressure is at goal he will continue taking the medications as prescribed  Patient advised to increase levothyroxine to 75 microgram today since his TSH is not at goal   Patient started on Prozac he will take 10 milligram tablet for 1 week and thereafter increased to 20 milligram   He will call us back if he experiences worsening symptoms  Patient's cholesterol panel is borderline high  Encouraged to eat healthy and to exercise on regular basis  I will see him back with labs after 3 months     Diagnoses and all orders for this visit:    Hypothyroidism, unspecified type  - TSH, 3rd generation with T4 reflex  -     levothyroxine 75 mcg tablet; Take 1 tablet (75 mcg total) by mouth daily  -     CBC and differential  -     Comprehensive metabolic panel    Benign essential hypertension  -     hydrochlorothiazide (MICROZIDE) 12 5 mg capsule; Take 1 capsule (12 5 mg total) by mouth every morning  -     metoprolol succinate (TOPROL-XL) 50 mg 24 hr tablet; Take 1 tablet (50 mg total) by mouth daily  -     CBC and differential  -     Comprehensive metabolic panel    Adjustment disorder with anxiety  -     FLUoxetine (PROzac) 20 MG tablet; Take 1 tablet (20 mg total) by mouth daily  -     CBC and differential  -     Comprehensive metabolic panel    Dyslipidemia  -     CBC and differential  -     Comprehensive metabolic panel    Class 3 obesity due to excess calories without serious comorbidity with body mass index (BMI) of 40 0 to 44 9 in adult (HCC)  -     CBC and differential  -     Comprehensive metabolic panel    Prostate cancer (HCC)  -     PSA, total and free;  Future  -     PSA, total and free

## 2018-07-11 ENCOUNTER — TELEPHONE (OUTPATIENT)
Dept: FAMILY MEDICINE CLINIC | Facility: CLINIC | Age: 48
End: 2018-07-11

## 2018-07-11 DIAGNOSIS — I10 BENIGN ESSENTIAL HYPERTENSION: ICD-10-CM

## 2018-07-11 DIAGNOSIS — E03.9 HYPOTHYROIDISM, UNSPECIFIED TYPE: ICD-10-CM

## 2018-07-11 RX ORDER — METOPROLOL SUCCINATE 50 MG/1
50 TABLET, EXTENDED RELEASE ORAL DAILY
Qty: 30 TABLET | Refills: 0 | Status: SHIPPED | OUTPATIENT
Start: 2018-07-11 | End: 2018-07-26 | Stop reason: SDUPTHER

## 2018-07-11 RX ORDER — HYDROCHLOROTHIAZIDE 12.5 MG/1
12.5 CAPSULE, GELATIN COATED ORAL EVERY MORNING
Qty: 30 CAPSULE | Refills: 0 | Status: SHIPPED | OUTPATIENT
Start: 2018-07-11 | End: 2018-07-26 | Stop reason: SDUPTHER

## 2018-07-11 RX ORDER — LEVOTHYROXINE SODIUM 0.07 MG/1
TABLET ORAL
Qty: 30 TABLET | Refills: 0 | Status: SHIPPED | OUTPATIENT
Start: 2018-07-11 | End: 2018-07-26 | Stop reason: SDUPTHER

## 2018-07-11 NOTE — TELEPHONE ENCOUNTER
Pt is requesting a med refill for  Hydrochlorothiazide 12 5mg  Levothyroxine 75mcg  Metoprolol     [facility]  He does have an appointment on 7/26

## 2018-07-25 LAB
ALBUMIN SERPL-MCNC: 4.1 G/DL (ref 3.5–5.5)
ALBUMIN/GLOB SERPL: 1.3 {RATIO} (ref 1.2–2.2)
ALP SERPL-CCNC: 80 IU/L (ref 39–117)
ALT SERPL-CCNC: 39 IU/L (ref 0–44)
AST SERPL-CCNC: 25 IU/L (ref 0–40)
BASOPHILS # BLD AUTO: 0 X10E3/UL (ref 0–0.2)
BASOPHILS NFR BLD AUTO: 0 %
BILIRUB SERPL-MCNC: 0.6 MG/DL (ref 0–1.2)
BUN SERPL-MCNC: 17 MG/DL (ref 6–24)
BUN/CREAT SERPL: 18 (ref 9–20)
CALCIUM SERPL-MCNC: 9.2 MG/DL (ref 8.7–10.2)
CHLORIDE SERPL-SCNC: 99 MMOL/L (ref 96–106)
CO2 SERPL-SCNC: 26 MMOL/L (ref 20–29)
CREAT SERPL-MCNC: 0.93 MG/DL (ref 0.76–1.27)
EOSINOPHIL # BLD AUTO: 0.1 X10E3/UL (ref 0–0.4)
EOSINOPHIL NFR BLD AUTO: 2 %
ERYTHROCYTE [DISTWIDTH] IN BLOOD BY AUTOMATED COUNT: 13.1 % (ref 12.3–15.4)
GLOBULIN SER-MCNC: 3.1 G/DL (ref 1.5–4.5)
GLUCOSE SERPL-MCNC: 94 MG/DL (ref 65–99)
HCT VFR BLD AUTO: 43.9 % (ref 37.5–51)
HGB BLD-MCNC: 14.9 G/DL (ref 13–17.7)
IMM GRANULOCYTES # BLD: 0 X10E3/UL (ref 0–0.1)
IMM GRANULOCYTES NFR BLD: 0 %
LYMPHOCYTES # BLD AUTO: 1.8 X10E3/UL (ref 0.7–3.1)
LYMPHOCYTES NFR BLD AUTO: 28 %
MCH RBC QN AUTO: 30.4 PG (ref 26.6–33)
MCHC RBC AUTO-ENTMCNC: 33.9 G/DL (ref 31.5–35.7)
MCV RBC AUTO: 90 FL (ref 79–97)
MONOCYTES # BLD AUTO: 0.3 X10E3/UL (ref 0.1–0.9)
MONOCYTES NFR BLD AUTO: 5 %
NEUTROPHILS # BLD AUTO: 4.2 X10E3/UL (ref 1.4–7)
NEUTROPHILS NFR BLD AUTO: 65 %
PLATELET # BLD AUTO: 214 X10E3/UL (ref 150–379)
POTASSIUM SERPL-SCNC: 4.2 MMOL/L (ref 3.5–5.2)
PROT SERPL-MCNC: 7.2 G/DL (ref 6–8.5)
PSA FREE MFR SERPL: 60 %
PSA FREE SERPL-MCNC: 0.3 NG/ML
PSA SERPL-MCNC: 0.5 NG/ML (ref 0–4)
RBC # BLD AUTO: 4.9 X10E6/UL (ref 4.14–5.8)
SL AMB EGFR AFRICAN AMERICAN: 111 ML/MIN/1.73
SL AMB EGFR NON AFRICAN AMERICAN: 96 ML/MIN/1.73
SODIUM SERPL-SCNC: 139 MMOL/L (ref 134–144)
TSH SERPL DL<=0.005 MIU/L-ACNC: 4.11 UIU/ML (ref 0.45–4.5)
WBC # BLD AUTO: 6.4 X10E3/UL (ref 3.4–10.8)

## 2018-07-26 ENCOUNTER — OFFICE VISIT (OUTPATIENT)
Dept: FAMILY MEDICINE CLINIC | Facility: CLINIC | Age: 48
End: 2018-07-26
Payer: COMMERCIAL

## 2018-07-26 VITALS
RESPIRATION RATE: 16 BRPM | BODY MASS INDEX: 43.4 KG/M2 | SYSTOLIC BLOOD PRESSURE: 140 MMHG | HEIGHT: 71 IN | DIASTOLIC BLOOD PRESSURE: 84 MMHG | WEIGHT: 310 LBS | OXYGEN SATURATION: 98 % | HEART RATE: 65 BPM

## 2018-07-26 DIAGNOSIS — E03.9 HYPOTHYROIDISM, UNSPECIFIED TYPE: Primary | ICD-10-CM

## 2018-07-26 DIAGNOSIS — E78.5 DYSLIPIDEMIA: ICD-10-CM

## 2018-07-26 DIAGNOSIS — E66.01 CLASS 3 SEVERE OBESITY DUE TO EXCESS CALORIES WITHOUT SERIOUS COMORBIDITY WITH BODY MASS INDEX (BMI) OF 40.0 TO 44.9 IN ADULT (HCC): ICD-10-CM

## 2018-07-26 DIAGNOSIS — I10 BENIGN ESSENTIAL HYPERTENSION: ICD-10-CM

## 2018-07-26 PROCEDURE — 1036F TOBACCO NON-USER: CPT | Performed by: FAMILY MEDICINE

## 2018-07-26 PROCEDURE — 99213 OFFICE O/P EST LOW 20 MIN: CPT | Performed by: FAMILY MEDICINE

## 2018-07-26 RX ORDER — LEVOTHYROXINE SODIUM 0.07 MG/1
75 TABLET ORAL DAILY
Qty: 90 TABLET | Refills: 1 | Status: SHIPPED | OUTPATIENT
Start: 2018-07-26 | End: 2019-02-13 | Stop reason: SDUPTHER

## 2018-07-26 RX ORDER — HYDROCHLOROTHIAZIDE 12.5 MG/1
12.5 CAPSULE, GELATIN COATED ORAL EVERY MORNING
Qty: 90 CAPSULE | Refills: 1 | Status: SHIPPED | OUTPATIENT
Start: 2018-07-26 | End: 2019-02-13 | Stop reason: SDUPTHER

## 2018-07-26 RX ORDER — METOPROLOL SUCCINATE 50 MG/1
50 TABLET, EXTENDED RELEASE ORAL DAILY
Qty: 90 TABLET | Refills: 1 | Status: SHIPPED | OUTPATIENT
Start: 2018-07-26 | End: 2019-02-13 | Stop reason: SDUPTHER

## 2018-07-26 NOTE — PROGRESS NOTES
Subjective:      Patient ID: Genna Babinski is a 50 y o  male  patient is here for 6 month follow-up  Has a history of hypertension, blood pressure is at goal   Also has a history of hypothyroidism  His TSH is improved from 5 08 to 4 1 on increasing dose of levothyroxine to 75 microgram daily  Also has a history of hyperlipidemia with borderline high triglycerides  Patient has been making significant changes to his diet and exercise more frequently  He has lost at least 5 pounds in the past few months  Hypertension   This is a chronic problem  The current episode started more than 1 year ago  The problem has been gradually improving since onset  The problem is controlled  Pertinent negatives include no blurred vision, chest pain, headaches, palpitations, peripheral edema or shortness of breath  There are no associated agents to hypertension  Treatments tried: HYDROCHLOROTHIAZIDE 12 5 MILLIGRAM, Metoprolol XL 50 milligram daily  The current treatment provides significant improvement  There are no compliance problems  Identifiable causes of hypertension include a thyroid problem  Thyroid Problem   Presents for follow-up visit  Patient reports no cold intolerance, constipation, depressed mood, fatigue, hair loss, heat intolerance, leg swelling, palpitations, tremors or weight gain  The symptoms have been stable (TSH 4 1 on 75 micrograms of levothyroxine  )  Past Medical History:   Diagnosis Date    Disease of thyroid gland     Hypertension        Family History   Problem Relation Age of Onset    Emphysema Mother     Bone cancer Mother    [place] Breast cancer Mother     Lung cancer Mother    [place] Dementia Father     Stroke Father     Hyperlipidemia Father     Hypertension Father     Diabetes type II Father     Thyroid cancer Sister        History reviewed  No pertinent surgical history  reports that he has never smoked  He has never used smokeless tobacco  He reports that he drinks alcohol   He reports that he does not use drugs  Current Outpatient Prescriptions:     hydrochlorothiazide (MICROZIDE) 12 5 mg capsule, Take 1 capsule (12 5 mg total) by mouth every morning, Disp: 90 capsule, Rfl: 1    levothyroxine 75 mcg tablet, Take 1 tablet (75 mcg total) by mouth daily, Disp: 90 tablet, Rfl: 1    metoprolol succinate (TOPROL-XL) 50 mg 24 hr tablet, Take 1 tablet (50 mg total) by mouth daily, Disp: 90 tablet, Rfl: 1    The following portions of the patient's history were reviewed and updated as appropriate: allergies, current medications, past family history, past medical history, past social history, past surgical history and problem list     Review of Systems   Constitutional: Negative  Negative for fatigue and weight gain  Eyes: Negative for blurred vision  Respiratory: Negative  Negative for shortness of breath  Cardiovascular: Negative  Negative for chest pain and palpitations  Gastrointestinal: Negative  Negative for constipation  Endocrine: Negative for cold intolerance and heat intolerance  Musculoskeletal: Negative  Negative for myalgias  Neurological: Negative  Negative for tremors and headaches  Psychiatric/Behavioral: Negative  Objective:    /84 (BP Location: Right arm, Patient Position: Sitting, Cuff Size: Standard)   Pulse 65   Resp 16   Ht 5' 11" (1 803 m)   Wt (!) 141 kg (310 lb)   SpO2 98%   BMI 43 24 kg/m²      Physical Exam   Constitutional: He is oriented to person, place, and time  He appears well-developed and well-nourished  Cardiovascular: Normal rate, regular rhythm and normal heart sounds  Pulmonary/Chest: Effort normal and breath sounds normal    Abdominal: Soft  Bowel sounds are normal    Neurological: He is alert and oriented to person, place, and time     Psychiatric: His behavior is normal  Judgment normal          Recent Results (from the past 1008 hour(s))   TSH, 3rd generation with T4 reflex    Collection Time: 07/24/18  7:13 AM   Result Value Ref Range    TSH 4 110 0 450 - 4 500 uIU/mL   CBC and differential    Collection Time: 07/24/18  7:13 AM   Result Value Ref Range    SL AMB LAB WHITE BLOOD CELL COUNT 6 4 3 4 - 10 8 x10E3/uL    SL AMB LAB RED BLOOD CELLS 4 90 4 14 - 5 80 x10E6/uL    Hemoglobin 14 9 13 0 - 17 7 g/dL    Hematocrit 43 9 37 5 - 51 0 %    MCV 90 79 - 97 fL    MCH 30 4 26 6 - 33 0 pg    MCHC 33 9 31 5 - 35 7 g/dL    RDW 13 1 12 3 - 15 4 %    Platelet Count 185 607 - 379 x10E3/uL    Neutrophils 65 Not Estab  %    Lymphocytes 28 Not Estab  %    Monocytes 5 Not Estab  %    Eosinophils 2 Not Estab  %    Basophils 0 Not Estab  %    Neutrophils (Absolute) 4 2 1 4 - 7 0 x10E3/uL    Lymphocytes (Absolute) 1 8 0 7 - 3 1 x10E3/uL    Monocytes (Absolute) 0 3 0 1 - 0 9 x10E3/uL    Eosinophils (Absolute) 0 1 0 0 - 0 4 x10E3/uL    Basophils (Absolute) 0 0 0 0 - 0 2 x10E3/uL    IMM  GRANULOCYTES (CD4/8) 0 Not Estab  %    IIMM  GRANS,ABS (CD4/8) 0 0 0 0 - 0 1 x10E3/uL   Comprehensive metabolic panel    Collection Time: 07/24/18  7:13 AM   Result Value Ref Range    SL AMB GLUCOSE 94 65 - 99 mg/dL    BUN 17 6 - 24 mg/dL    Creatinine, Serum 0 93 0 76 - 1 27 mg/dL    eGFR Non  96 >59 mL/min/1 73    SL AMB EGFR AFRICAN AMERICAN 111 >59 mL/min/1 73    SL AMB BUN/CREATININE RATIO 18 9 - 20    SL AMB SODIUM 139 134 - 144 mmol/L    SL AMB POTASSIUM 4 2 3 5 - 5 2 mmol/L    SL AMB CHLORIDE 99 96 - 106 mmol/L    SL AMB CARBON DIOXIDE 26 20 - 29 mmol/L    CALCIUM 9 2 8 7 - 10 2 mg/dL    SL AMB PROTEIN, TOTAL 7 2 6 0 - 8 5 g/dL    Serum Albumin 4 1 3 5 - 5 5 g/dL    Globulin, Total 3 1 1 5 - 4 5 g/dL    SL AMB ALBUMIN/GLOBULIN RATIO 1 3 1 2 - 2 2    SL AMB BILIRUBIN, TOTAL 0 6 0 0 - 1 2 mg/dL    Alk Phos Isoenzymes 80 39 - 117 IU/L    SL AMB AST 25 0 - 40 IU/L    SL AMB ALT 39 0 - 44 IU/L   PSA, total and free    Collection Time: 07/24/18  7:17 AM   Result Value Ref Range    Prostate Specific Antigen Total 0 5 0 0 - 4 0 ng/mL    PSA, Free 0 30 N/A ng/mL    PSA, Free Pct 60 0 %       Assessment/Plan:      Patient encouraged to continue with lifestyle modification  continue levothyroxine 75 microgram and the blood pressure medications since his levels are at goal     Recheck lab specially cholesterol panel and A1c after 6 months and follow up thereafter  Diagnoses and all orders for this visit:    Hypothyroidism, unspecified type  -     Comprehensive metabolic panel; Future  -     Hemoglobin A1C; Future  -     TSH, 3rd generation with Free T4 reflex; Future  -     levothyroxine 75 mcg tablet; Take 1 tablet (75 mcg total) by mouth daily  -     Comprehensive metabolic panel  -     Hemoglobin A1C  -     TSH, 3rd generation with Free T4 reflex    Benign essential hypertension  -     Comprehensive metabolic panel; Future  -     Hemoglobin A1C; Future  -     Microalbumin / creatinine urine ratio; Future  -     hydrochlorothiazide (MICROZIDE) 12 5 mg capsule; Take 1 capsule (12 5 mg total) by mouth every morning  -     metoprolol succinate (TOPROL-XL) 50 mg 24 hr tablet; Take 1 tablet (50 mg total) by mouth daily  -     Comprehensive metabolic panel  -     Hemoglobin A1C  -     Microalbumin / creatinine urine ratio    Dyslipidemia  -     Comprehensive metabolic panel; Future  -     Hemoglobin A1C; Future  -     Lipid panel; Future  -     Comprehensive metabolic panel  -     Hemoglobin A1C  -     Lipid panel    Class 3 severe obesity due to excess calories without serious comorbidity with body mass index (BMI) of 40 0 to 44 9 in adult [facility])  -     Comprehensive metabolic panel;  Future  -     Hemoglobin A1C; Future  -     Comprehensive metabolic panel  -     Hemoglobin A1C

## 2018-08-08 ENCOUNTER — OFFICE VISIT (OUTPATIENT)
Dept: FAMILY MEDICINE CLINIC | Facility: CLINIC | Age: 48
End: 2018-08-08
Payer: COMMERCIAL

## 2018-08-08 VITALS
RESPIRATION RATE: 16 BRPM | SYSTOLIC BLOOD PRESSURE: 120 MMHG | HEIGHT: 71 IN | WEIGHT: 310 LBS | OXYGEN SATURATION: 98 % | TEMPERATURE: 99 F | HEART RATE: 101 BPM | DIASTOLIC BLOOD PRESSURE: 80 MMHG | BODY MASS INDEX: 43.4 KG/M2

## 2018-08-08 DIAGNOSIS — L02.11 CELLULITIS AND ABSCESS OF NECK: ICD-10-CM

## 2018-08-08 DIAGNOSIS — L72.3 INFECTED SEBACEOUS CYST: Primary | ICD-10-CM

## 2018-08-08 DIAGNOSIS — L08.9 INFECTED SEBACEOUS CYST: Primary | ICD-10-CM

## 2018-08-08 DIAGNOSIS — L03.221 CELLULITIS AND ABSCESS OF NECK: ICD-10-CM

## 2018-08-08 PROCEDURE — 99213 OFFICE O/P EST LOW 20 MIN: CPT | Performed by: FAMILY MEDICINE

## 2018-08-08 RX ORDER — OXYCODONE HYDROCHLORIDE AND ACETAMINOPHEN 5; 325 MG/1; MG/1
1 TABLET ORAL EVERY 4 HOURS PRN
Qty: 20 TABLET | Refills: 0 | Status: SHIPPED | OUTPATIENT
Start: 2018-08-08 | End: 2019-09-12

## 2018-08-08 RX ORDER — SULFAMETHOXAZOLE AND TRIMETHOPRIM 800; 160 MG/1; MG/1
1 TABLET ORAL EVERY 12 HOURS SCHEDULED
Qty: 30 TABLET | Refills: 0 | Status: SHIPPED | OUTPATIENT
Start: 2018-08-08 | End: 2018-08-23

## 2018-08-08 NOTE — PROGRESS NOTES
Subjective:      Patient ID: Barbi Maldonado is a 50 y o  male  Patient presents for  Evaluation of painful lump on the back of his neck  Patient states that he has always had a small lump in that area but was told that it was a fatty tumor  Recently over the past week this is been enlarging and exquisitely painful  No fevers or chills  No drainage  Past Medical History:   Diagnosis Date    Disease of thyroid gland     Hypertension        Family History   Problem Relation Age of Onset    Emphysema Mother     Bone cancer Mother    [place] Breast cancer Mother     Lung cancer Mother     Dementia Father     Stroke Father     Hyperlipidemia Father     Hypertension Father     Diabetes type II Father     Thyroid cancer Sister        No past surgical history on file  reports that he has never smoked  He has never used smokeless tobacco  He reports that he drinks alcohol  He reports that he does not use drugs        Current Outpatient Prescriptions:     hydrochlorothiazide (MICROZIDE) 12 5 mg capsule, Take 1 capsule (12 5 mg total) by mouth every morning, Disp: 90 capsule, Rfl: 1    levothyroxine 75 mcg tablet, Take 1 tablet (75 mcg total) by mouth daily, Disp: 90 tablet, Rfl: 1    metoprolol succinate (TOPROL-XL) 50 mg 24 hr tablet, Take 1 tablet (50 mg total) by mouth daily, Disp: 90 tablet, Rfl: 1    oxyCODONE-acetaminophen (PERCOCET) 5-325 mg per tablet, Take 1 tablet by mouth every 4 (four) hours as needed for moderate pain Max Daily Amount: 6 tablets, Disp: 20 tablet, Rfl: 0    sulfamethoxazole-trimethoprim (BACTRIM DS) 800-160 mg per tablet, Take 1 tablet by mouth every 12 (twelve) hours for 15 days, Disp: 30 tablet, Rfl: 0    The following portions of the patient's history were reviewed and updated as appropriate: allergies, current medications, past family history, past medical history, past social history, past surgical history and problem list     Review of Systems   Constitutional: Negative  HENT: Negative for trouble swallowing  Musculoskeletal: Positive for neck pain  Negative for neck stiffness  Objective:    /80   Pulse 101   Temp 99 °F (37 2 °C)   Resp 16   Ht 5' 11" (1 803 m)   Wt (!) 141 kg (310 lb)   SpO2 98%   BMI 43 24 kg/m²      Physical Exam   Constitutional: He appears well-developed and well-nourished  No distress  Obese   Neck: Normal range of motion  Neck supple  Skin:              Recent Results (from the past 1008 hour(s))   TSH, 3rd generation with T4 reflex    Collection Time: 07/24/18  7:13 AM   Result Value Ref Range    TSH 4 110 0 450 - 4 500 uIU/mL   CBC and differential    Collection Time: 07/24/18  7:13 AM   Result Value Ref Range    SL AMB LAB WHITE BLOOD CELL COUNT 6 4 3 4 - 10 8 x10E3/uL    SL AMB LAB RED BLOOD CELLS 4 90 4 14 - 5 80 x10E6/uL    Hemoglobin 14 9 13 0 - 17 7 g/dL    Hematocrit 43 9 37 5 - 51 0 %    MCV 90 79 - 97 fL    MCH 30 4 26 6 - 33 0 pg    MCHC 33 9 31 5 - 35 7 g/dL    RDW 13 1 12 3 - 15 4 %    Platelet Count 952 153 - 379 x10E3/uL    Neutrophils 65 Not Estab  %    Lymphocytes 28 Not Estab  %    Monocytes 5 Not Estab  %    Eosinophils 2 Not Estab  %    Basophils 0 Not Estab  %    Neutrophils (Absolute) 4 2 1 4 - 7 0 x10E3/uL    Lymphocytes (Absolute) 1 8 0 7 - 3 1 x10E3/uL    Monocytes (Absolute) 0 3 0 1 - 0 9 x10E3/uL    Eosinophils (Absolute) 0 1 0 0 - 0 4 x10E3/uL    Basophils (Absolute) 0 0 0 0 - 0 2 x10E3/uL    IMM  GRANULOCYTES (CD4/8) 0 Not Estab  %    IIMM  GRANS,ABS (CD4/8) 0 0 0 0 - 0 1 x10E3/uL   Comprehensive metabolic panel    Collection Time: 07/24/18  7:13 AM   Result Value Ref Range    SL AMB GLUCOSE 94 65 - 99 mg/dL    BUN 17 6 - 24 mg/dL    Creatinine, Serum 0 93 0 76 - 1 27 mg/dL    eGFR Non  96 >59 mL/min/1 73    SL AMB EGFR AFRICAN AMERICAN 111 >59 mL/min/1 73    SL AMB BUN/CREATININE RATIO 18 9 - 20    SL AMB SODIUM 139 134 - 144 mmol/L    SL AMB POTASSIUM 4 2 3 5 - 5 2 mmol/L SL AMB CHLORIDE 99 96 - 106 mmol/L    SL AMB CARBON DIOXIDE 26 20 - 29 mmol/L    CALCIUM 9 2 8 7 - 10 2 mg/dL    SL AMB PROTEIN, TOTAL 7 2 6 0 - 8 5 g/dL    Serum Albumin 4 1 3 5 - 5 5 g/dL    Globulin, Total 3 1 1 5 - 4 5 g/dL    SL AMB ALBUMIN/GLOBULIN RATIO 1 3 1 2 - 2 2    SL AMB BILIRUBIN, TOTAL 0 6 0 0 - 1 2 mg/dL    Alk Phos Isoenzymes 80 39 - 117 IU/L    SL AMB AST 25 0 - 40 IU/L    SL AMB ALT 39 0 - 44 IU/L   PSA, total and free    Collection Time: 07/24/18  7:17 AM   Result Value Ref Range    Prostate Specific Antigen Total 0 5 0 0 - 4 0 ng/mL    PSA, Free 0 30 N/A ng/mL    PSA, Free Pct 60 0 %       Assessment/Plan:    Incision and Drainage  Date/Time: 8/8/2018 5:21 PM  Performed by: Fracisco Hodge by: Jammie Steinberg     Patient location:  Clinic  Other Assisting Provider: No    Consent:     Consent obtained:  Verbal    Consent given by:  Patient    Risks discussed:  Bleeding, incomplete drainage, pain, infection and damage to other organs    Alternatives discussed:  No treatment  Location:     Type:  Cyst (Sebaceous cyst)    Location:  Head/neck    Head/neck location:  Neck  Pre-procedure details:     Skin preparation:  Betadine  Anesthesia (see MAR for exact dosages): Anesthesia method:  Local infiltration    Local anesthetic:  Lidocaine 1% WITH epi (6 cc)  Procedure details:     Complexity:  Intermediate    Needle aspiration: no      Incision types:  Stab incision    Scalpel blade:  11    Approach:  Open    Incision depth:  Subcutaneous    Wound management:  Probed and deloculated    Drainage characteristics: Sebaceous material     Drainage amount:  Copious    Wound treatment:  Wound left open and packing placed    Packing materials:  1/2 in iodoform gauze (5 inches)    Amount 1/2" iodoform:   5 inches  Post-procedure details:     Patient tolerance of procedure:   Tolerated well, no immediate complications      No problem-specific Assessment & Plan notes found for this encounter  Problem List Items Addressed This Visit     Infected sebaceous cyst - Primary      I&D performed in the office  Patient tolerated procedure well  Most of the cyst capsule was able to be removed  Packed with iodoform gauze  Patient will return in 2 days for re-evaluation and repacking  Percocet provided to take as needed for pain  Wound instructions provided           Relevant Medications    sulfamethoxazole-trimethoprim (BACTRIM DS) 800-160 mg per tablet    oxyCODONE-acetaminophen (PERCOCET) 5-325 mg per tablet    Cellulitis and abscess of neck      Patient will be placed on Bactrim DS for 15 days         Relevant Medications    sulfamethoxazole-trimethoprim (BACTRIM DS) 800-160 mg per tablet    oxyCODONE-acetaminophen (PERCOCET) 5-325 mg per tablet

## 2018-08-08 NOTE — ASSESSMENT & PLAN NOTE
I&D performed in the office  Patient tolerated procedure well  Most of the cyst capsule was able to be removed  Packed with iodoform gauze  Patient will return in 2 days for re-evaluation and repacking  Percocet provided to take as needed for pain  Wound instructions provided

## 2018-08-10 ENCOUNTER — OFFICE VISIT (OUTPATIENT)
Dept: FAMILY MEDICINE CLINIC | Facility: CLINIC | Age: 48
End: 2018-08-10

## 2018-08-10 VITALS
OXYGEN SATURATION: 98 % | WEIGHT: 310 LBS | HEIGHT: 71 IN | SYSTOLIC BLOOD PRESSURE: 120 MMHG | BODY MASS INDEX: 43.4 KG/M2 | HEART RATE: 91 BPM | DIASTOLIC BLOOD PRESSURE: 80 MMHG | RESPIRATION RATE: 16 BRPM

## 2018-08-10 DIAGNOSIS — L72.3 INFECTED SEBACEOUS CYST: ICD-10-CM

## 2018-08-10 DIAGNOSIS — Z09 POSTOP CHECK: Primary | ICD-10-CM

## 2018-08-10 DIAGNOSIS — L08.9 INFECTED SEBACEOUS CYST: ICD-10-CM

## 2018-08-10 PROBLEM — L03.221 CELLULITIS AND ABSCESS OF NECK: Status: RESOLVED | Noted: 2018-08-08 | Resolved: 2018-08-10

## 2018-08-10 PROBLEM — L02.11 CELLULITIS AND ABSCESS OF NECK: Status: RESOLVED | Noted: 2018-08-08 | Resolved: 2018-08-10

## 2018-08-10 PROCEDURE — 99024 POSTOP FOLLOW-UP VISIT: CPT | Performed by: FAMILY MEDICINE

## 2018-08-10 NOTE — PROGRESS NOTES
Jose David Ortega is a 50 y o  male who presents today for a dressing change  Patient has a I&D site wound which is located on the neck  Pain is rated 2/10  The following portions of the patient's history were reviewed by a provider in this encounter and updated as appropriate:       Objective   /80   Pulse 91   Resp 16   Ht 5' 11" (1 803 m)   Wt (!) 141 kg (310 lb)   SpO2 98%   BMI 43 24 kg/m²   Wound:   appears improved compared to last recheck  serous exudate noted    Assessment/Plan   Diagnoses and all orders for this visit:    Postop check    Infected sebaceous cyst      Problem List Items Addressed This Visit     Infected sebaceous cyst      Packing change performed  3 inches of iodoform gauze packed into the wound  Patient will continue on Bactrim DS and ibuprofen for mild-to-moderate pain, Percocet for severe pain    Follow-up in 3 days for re-evaluation           Other Visit Diagnoses     Postop check    -  Primary            Jose Dykes DO

## 2018-08-10 NOTE — ASSESSMENT & PLAN NOTE
Packing change performed  3 inches of iodoform gauze packed into the wound  Patient will continue on Bactrim DS and ibuprofen for mild-to-moderate pain, Percocet for severe pain    Follow-up in 3 days for re-evaluation

## 2018-08-13 ENCOUNTER — OFFICE VISIT (OUTPATIENT)
Dept: FAMILY MEDICINE CLINIC | Facility: CLINIC | Age: 48
End: 2018-08-13

## 2018-08-13 VITALS
BODY MASS INDEX: 43.4 KG/M2 | RESPIRATION RATE: 16 BRPM | HEART RATE: 86 BPM | SYSTOLIC BLOOD PRESSURE: 112 MMHG | OXYGEN SATURATION: 98 % | HEIGHT: 71 IN | WEIGHT: 310 LBS | DIASTOLIC BLOOD PRESSURE: 70 MMHG

## 2018-08-13 DIAGNOSIS — Z09 POSTOP CHECK: Primary | ICD-10-CM

## 2018-08-13 DIAGNOSIS — L08.9 INFECTED SEBACEOUS CYST: ICD-10-CM

## 2018-08-13 DIAGNOSIS — L72.3 INFECTED SEBACEOUS CYST: ICD-10-CM

## 2018-08-13 PROCEDURE — 99024 POSTOP FOLLOW-UP VISIT: CPT | Performed by: FAMILY MEDICINE

## 2018-08-13 NOTE — PROGRESS NOTES
Subjective    Lynne Alvarez is a 50 y o  male who presents today for a dressing change  Patient has a I&D site wound which is located on the neck  Pain is rated 0/10  The following portions of the patient's history were reviewed by a provider in this encounter and updated as appropriate:   No text in SmartText         patient has been changing dressing which constitutes a Band-Aid  Packing remains in place  Packing removed with small amount of serous exudate  The    Objective   /70   Pulse 86   Resp 16   Ht 5' 11" (1 803 m)   Wt (!) 141 kg (310 lb)   SpO2 98%   BMI 43 24 kg/m²   Wound:   appears improved compared to last recheck    Assessment/Plan   Diagnoses and all orders for this visit:    Postop check    Infected sebaceous cyst     wound continues to close well  Packing was removed  Packing was replaced with new iodoform gauze approximately 1 5 inches  Patient will return  4 days for wound re-evaluation  Hopefully this will be his last re-evaluation    Finish course of Bactrim JAZZ Hilton DO

## 2018-08-13 NOTE — ASSESSMENT & PLAN NOTE
Packing change performed  1 5 inches of iodoform gauze packed into the wound  Patient will continue on Bactrim DS and ibuprofen for mild-to-moderate pain, Percocet for severe pain    Follow-up in 4 days for re-evaluation

## 2018-08-16 ENCOUNTER — OFFICE VISIT (OUTPATIENT)
Dept: FAMILY MEDICINE CLINIC | Facility: CLINIC | Age: 48
End: 2018-08-16

## 2018-08-16 VITALS
SYSTOLIC BLOOD PRESSURE: 122 MMHG | BODY MASS INDEX: 43.4 KG/M2 | WEIGHT: 310 LBS | HEART RATE: 84 BPM | DIASTOLIC BLOOD PRESSURE: 78 MMHG | RESPIRATION RATE: 16 BRPM | HEIGHT: 71 IN | OXYGEN SATURATION: 98 %

## 2018-08-16 DIAGNOSIS — L72.3 INFECTED SEBACEOUS CYST: ICD-10-CM

## 2018-08-16 DIAGNOSIS — L08.9 INFECTED SEBACEOUS CYST: ICD-10-CM

## 2018-08-16 DIAGNOSIS — Z09 POSTOP CHECK: Primary | ICD-10-CM

## 2018-08-16 PROCEDURE — 99024 POSTOP FOLLOW-UP VISIT: CPT | Performed by: FAMILY MEDICINE

## 2018-08-16 PROCEDURE — 3008F BODY MASS INDEX DOCD: CPT | Performed by: FAMILY MEDICINE

## 2018-08-16 NOTE — ASSESSMENT & PLAN NOTE
Area is almost completely healed  Remainder will heal by secondary intention  Patient has finished course of Bactrim  Applied Steri-Strips and a Band-Aid  Follow up p r n

## 2018-08-16 NOTE — PROGRESS NOTES
Subjective    Sean Whitney is a 50 y o  male who presents today for a dressing change  Patient has a I&D site wound which is located on the neck  Pain is rated 0/10  The following portions of the patient's history were reviewed by a provider in this encounter and updated as appropriate:   No text in SmartText          Objective   /78   Pulse 84   Resp 16   Ht 5' 11" (1 803 m)   Wt (!) 141 kg (310 lb)   SpO2 98%   BMI 43 24 kg/m²   Wound:   appears improved compared to last recheck  Scant drainage  Area was probed with a Q-tip  Very small pocket to the cephalad side of the wound  I informed packing was removed  No new packing was placed  Mastisol and Steri-Strips were applied    Assessment/Plan   Diagnoses and all orders for this visit:    Postop check      Problem List Items Addressed This Visit     Infected sebaceous cyst      Area is almost completely healed  Remainder will heal by secondary intention  Patient has finished course of Bactrim  Applied Steri-Strips and a Band-Aid  Follow up p r n             Other Visit Diagnoses     Postop check    -  Primary            Hannah Villarreal DO

## 2019-02-13 DIAGNOSIS — E03.9 HYPOTHYROIDISM, UNSPECIFIED TYPE: ICD-10-CM

## 2019-02-13 DIAGNOSIS — I10 BENIGN ESSENTIAL HYPERTENSION: ICD-10-CM

## 2019-02-13 RX ORDER — LEVOTHYROXINE SODIUM 0.07 MG/1
75 TABLET ORAL DAILY
Qty: 30 TABLET | Refills: 0 | Status: SHIPPED | OUTPATIENT
Start: 2019-02-13 | End: 2019-02-28

## 2019-02-13 RX ORDER — METOPROLOL SUCCINATE 50 MG/1
50 TABLET, EXTENDED RELEASE ORAL DAILY
Qty: 30 TABLET | Refills: 0 | Status: SHIPPED | OUTPATIENT
Start: 2019-02-13 | End: 2019-02-28 | Stop reason: SDUPTHER

## 2019-02-13 RX ORDER — HYDROCHLOROTHIAZIDE 12.5 MG/1
12.5 CAPSULE, GELATIN COATED ORAL EVERY MORNING
Qty: 30 CAPSULE | Refills: 0 | Status: SHIPPED | OUTPATIENT
Start: 2019-02-13 | End: 2019-02-28 | Stop reason: SDUPTHER

## 2019-02-13 NOTE — TELEPHONE ENCOUNTER
PT CLAIMS HE SCHEDULED AN APT FOR 28TH, HE IS REQUESTING JUST ENOUGH UNTIL HIS APT?   [facility]

## 2019-02-21 ENCOUNTER — TELEPHONE (OUTPATIENT)
Dept: FAMILY MEDICINE CLINIC | Facility: CLINIC | Age: 49
End: 2019-02-21

## 2019-02-21 DIAGNOSIS — R68.82 LIBIDO, DECREASED: Primary | ICD-10-CM

## 2019-02-26 LAB
ALBUMIN SERPL-MCNC: 4 G/DL (ref 3.5–5.5)
ALBUMIN/CREAT UR: <4.2 MG/G CREAT (ref 0–30)
ALBUMIN/GLOB SERPL: 1.4 {RATIO} (ref 1.2–2.2)
ALP SERPL-CCNC: 79 IU/L (ref 39–117)
ALT SERPL-CCNC: 42 IU/L (ref 0–44)
AST SERPL-CCNC: 25 IU/L (ref 0–40)
BILIRUB SERPL-MCNC: 0.4 MG/DL (ref 0–1.2)
BUN SERPL-MCNC: 20 MG/DL (ref 6–24)
BUN/CREAT SERPL: 22 (ref 9–20)
CALCIUM SERPL-MCNC: 9.4 MG/DL (ref 8.7–10.2)
CHLORIDE SERPL-SCNC: 100 MMOL/L (ref 96–106)
CHOLEST SERPL-MCNC: 177 MG/DL (ref 100–199)
CHOLEST/HDLC SERPL: 5.4 RATIO (ref 0–5)
CO2 SERPL-SCNC: 24 MMOL/L (ref 20–29)
CREAT SERPL-MCNC: 0.92 MG/DL (ref 0.76–1.27)
CREAT UR-MCNC: 70.7 MG/DL
EST. AVERAGE GLUCOSE BLD GHB EST-MCNC: 100 MG/DL
GLOBULIN SER-MCNC: 2.9 G/DL (ref 1.5–4.5)
GLUCOSE SERPL-MCNC: 92 MG/DL (ref 65–99)
HBA1C MFR BLD: 5.1 % (ref 4.8–5.6)
HDLC SERPL-MCNC: 33 MG/DL
LDLC SERPL CALC-MCNC: 116 MG/DL (ref 0–99)
MICROALBUMIN UR-MCNC: <3 UG/ML
POTASSIUM SERPL-SCNC: 3.9 MMOL/L (ref 3.5–5.2)
PROT SERPL-MCNC: 6.9 G/DL (ref 6–8.5)
SL AMB EGFR AFRICAN AMERICAN: 112 ML/MIN/1.73
SL AMB EGFR NON AFRICAN AMERICAN: 97 ML/MIN/1.73
SL AMB T4, FREE (DIRECT): 1.33 NG/DL (ref 0.82–1.77)
SL AMB VLDL CHOLESTEROL CALC: 28 MG/DL (ref 5–40)
SODIUM SERPL-SCNC: 138 MMOL/L (ref 134–144)
TESTOST FREE SERPL-MCNC: 7.5 PG/ML (ref 7.2–24)
TESTOST SERPL-MCNC: 385 NG/DL (ref 264–916)
TRIGL SERPL-MCNC: 138 MG/DL (ref 0–149)
TSH SERPL DL<=0.005 MIU/L-ACNC: 5.09 UIU/ML (ref 0.45–4.5)

## 2019-02-28 ENCOUNTER — OFFICE VISIT (OUTPATIENT)
Dept: FAMILY MEDICINE CLINIC | Facility: CLINIC | Age: 49
End: 2019-02-28
Payer: COMMERCIAL

## 2019-02-28 VITALS
OXYGEN SATURATION: 98 % | HEART RATE: 84 BPM | DIASTOLIC BLOOD PRESSURE: 88 MMHG | HEIGHT: 71 IN | SYSTOLIC BLOOD PRESSURE: 138 MMHG | RESPIRATION RATE: 18 BRPM | WEIGHT: 315 LBS | BODY MASS INDEX: 44.1 KG/M2

## 2019-02-28 DIAGNOSIS — Z12.11 SCREEN FOR COLON CANCER: ICD-10-CM

## 2019-02-28 DIAGNOSIS — I10 BENIGN ESSENTIAL HYPERTENSION: ICD-10-CM

## 2019-02-28 DIAGNOSIS — E66.01 MORBID OBESITY (HCC): ICD-10-CM

## 2019-02-28 DIAGNOSIS — E03.9 HYPOTHYROIDISM, UNSPECIFIED TYPE: Primary | ICD-10-CM

## 2019-02-28 DIAGNOSIS — R07.9 CHEST PAIN, UNSPECIFIED TYPE: ICD-10-CM

## 2019-02-28 DIAGNOSIS — E78.5 HYPERLIPIDEMIA, UNSPECIFIED HYPERLIPIDEMIA TYPE: ICD-10-CM

## 2019-02-28 PROCEDURE — 93000 ELECTROCARDIOGRAM COMPLETE: CPT | Performed by: FAMILY MEDICINE

## 2019-02-28 PROCEDURE — 3075F SYST BP GE 130 - 139MM HG: CPT | Performed by: FAMILY MEDICINE

## 2019-02-28 PROCEDURE — 99215 OFFICE O/P EST HI 40 MIN: CPT | Performed by: FAMILY MEDICINE

## 2019-02-28 PROCEDURE — 3008F BODY MASS INDEX DOCD: CPT | Performed by: FAMILY MEDICINE

## 2019-02-28 PROCEDURE — 3079F DIAST BP 80-89 MM HG: CPT | Performed by: FAMILY MEDICINE

## 2019-02-28 RX ORDER — METOPROLOL SUCCINATE 50 MG/1
50 TABLET, EXTENDED RELEASE ORAL DAILY
Qty: 90 TABLET | Refills: 1 | Status: SHIPPED | OUTPATIENT
Start: 2019-02-28 | End: 2019-09-12 | Stop reason: SDUPTHER

## 2019-02-28 RX ORDER — HYDROCHLOROTHIAZIDE 12.5 MG/1
12.5 CAPSULE, GELATIN COATED ORAL EVERY MORNING
Qty: 90 CAPSULE | Refills: 1 | Status: SHIPPED | OUTPATIENT
Start: 2019-02-28 | End: 2019-09-12 | Stop reason: SDUPTHER

## 2019-02-28 RX ORDER — LEVOTHYROXINE SODIUM 0.1 MG/1
100 TABLET ORAL DAILY
Qty: 90 TABLET | Refills: 1 | Status: SHIPPED | OUTPATIENT
Start: 2019-02-28 | End: 2019-09-12 | Stop reason: SDUPTHER

## 2019-02-28 RX ORDER — ROSUVASTATIN CALCIUM 5 MG/1
5 TABLET, COATED ORAL DAILY
Qty: 90 TABLET | Refills: 1 | Status: SHIPPED | OUTPATIENT
Start: 2019-02-28 | End: 2019-09-12

## 2019-02-28 NOTE — PROGRESS NOTES
Subjective:      Patient ID: Carlos Tabor is a 46 y o  male  Hypertension   This is a chronic problem  The current episode started more than 1 year ago  The problem is unchanged  The problem is controlled  Associated symptoms include chest pain  Pertinent negatives include no headaches, orthopnea, palpitations, peripheral edema or shortness of breath  Risk factors for coronary artery disease include dyslipidemia, obesity and sedentary lifestyle  Treatments tried: Metoprolol XL 50 milligram daily, hydrochlorothiazide 12 5 milligram daily  The current treatment provides significant improvement  There are no compliance problems  Identifiable causes of hypertension include a thyroid problem  Hyperlipidemia   This is a chronic problem  The current episode started more than 1 year ago  The problem is uncontrolled  Associated symptoms include chest pain  Pertinent negatives include no myalgias or shortness of breath  Current antihyperlipidemic treatment includes diet change and exercise  The current treatment provides mild improvement of lipids  There are no compliance problems  Risk factors for coronary artery disease include dyslipidemia, hypertension, male sex and obesity  Chest Pain    This is a new problem  The current episode started more than 1 month ago  The onset quality is gradual  The problem occurs intermittently  The problem has been resolved  The pain is present in the substernal region  The pain is at a severity of 4/10  The pain is mild  The quality of the pain is described as dull and tightness  The pain does not radiate  Pertinent negatives include no headaches, orthopnea, palpitations or shortness of breath  The pain is aggravated by nothing  He has tried nothing for the symptoms  Risk factors include lack of exercise, male gender, obesity and sedentary lifestyle  His past medical history is significant for hyperlipidemia, hypertension and thyroid problem     Thyroid Problem   Presents for follow-up visit  Symptoms include anxiety and fatigue  Patient reports no depressed mood or palpitations  Symptom course: TSH 5 09 ON 75 MICROGRAMS LEVOTHYROXINE  His past medical history is significant for hyperlipidemia  Past Medical History:   Diagnosis Date    Disease of thyroid gland     Hypertension        Family History   Problem Relation Age of Onset    Emphysema Mother     Bone cancer Mother    Kenny Pert Breast cancer Mother     Lung cancer Mother     Dementia Father     Stroke Father     Hyperlipidemia Father     Hypertension Father     Diabetes type II Father     Thyroid cancer Sister        No past surgical history on file  reports that he has never smoked  He has never used smokeless tobacco  He reports that he drinks alcohol  He reports that he does not use drugs  Current Outpatient Medications:     hydrochlorothiazide (MICROZIDE) 12 5 mg capsule, Take 1 capsule (12 5 mg total) by mouth every morning, Disp: 90 capsule, Rfl: 1    metoprolol succinate (TOPROL-XL) 50 mg 24 hr tablet, Take 1 tablet (50 mg total) by mouth daily, Disp: 90 tablet, Rfl: 1    oxyCODONE-acetaminophen (PERCOCET) 5-325 mg per tablet, Take 1 tablet by mouth every 4 (four) hours as needed for moderate pain Max Daily Amount: 6 tablets, Disp: 20 tablet, Rfl: 0    levothyroxine 100 mcg tablet, Take 1 tablet (100 mcg total) by mouth daily, Disp: 90 tablet, Rfl: 1    rosuvastatin (CRESTOR) 5 mg tablet, Take 1 tablet (5 mg total) by mouth daily, Disp: 90 tablet, Rfl: 1    The following portions of the patient's history were reviewed and updated as appropriate: allergies, current medications, past family history, past medical history, past social history, past surgical history and problem list     Review of Systems   Constitutional: Positive for fatigue  Respiratory: Negative  Negative for shortness of breath  Cardiovascular: Positive for chest pain  Negative for palpitations and orthopnea     Gastrointestinal: Negative  Musculoskeletal: Negative  Negative for myalgias  Neurological: Negative  Negative for headaches  Psychiatric/Behavioral: The patient is nervous/anxious  Objective:    /88 (BP Location: Left arm, Patient Position: Sitting, Cuff Size: Large)   Pulse 84   Resp 18   Ht 5' 11" (1 803 m)   Wt (!) 146 kg (322 lb)   SpO2 98%   BMI 44 91 kg/m²      Physical Exam   Constitutional: He is oriented to person, place, and time  He appears well-developed and well-nourished  Cardiovascular: Normal rate, regular rhythm and normal heart sounds  Pulmonary/Chest: Effort normal and breath sounds normal    Abdominal: Soft  Bowel sounds are normal    Neurological: He is alert and oriented to person, place, and time     Psychiatric: His behavior is normal  Judgment normal          Recent Results (from the past 1008 hour(s))   Testosterone, free, total    Collection Time: 02/23/19  7:01 AM   Result Value Ref Range    TESTOSTERONE TOTAL 385 264 - 916 ng/dL    Testosterone, Free 7 5 7 2 - 24 0 pg/mL   Comprehensive metabolic panel    Collection Time: 02/23/19  7:01 AM   Result Value Ref Range    Glucose, Random 92 65 - 99 mg/dL    BUN 20 6 - 24 mg/dL    Creatinine 0 92 0 76 - 1 27 mg/dL    eGFR Non African American 97 >59 mL/min/1 73    eGFR  112 >59 mL/min/1 73    SL AMB BUN/CREATININE RATIO 22 (H) 9 - 20    Sodium 138 134 - 144 mmol/L    Potassium 3 9 3 5 - 5 2 mmol/L    Chloride 100 96 - 106 mmol/L    CO2 24 20 - 29 mmol/L    CALCIUM 9 4 8 7 - 10 2 mg/dL    Protein, Total 6 9 6 0 - 8 5 g/dL    Albumin 4 0 3 5 - 5 5 g/dL    Globulin, Total 2 9 1 5 - 4 5 g/dL    Albumin/Globulin Ratio 1 4 1 2 - 2 2    TOTAL BILIRUBIN 0 4 0 0 - 1 2 mg/dL    Alk Phos Isoenzymes 79 39 - 117 IU/L    AST 25 0 - 40 IU/L    ALT 42 0 - 44 IU/L   Lipid panel    Collection Time: 02/23/19  7:01 AM   Result Value Ref Range    Cholesterol, Total 177 100 - 199 mg/dL    Triglycerides 138 0 - 149 mg/dL    HDL 33 (L) >39 mg/dL    VLDL Cholesterol Calculated 28 5 - 40 mg/dL    LDL Direct 116 (H) 0 - 99 mg/dL    T  Chol/HDL Ratio 5 4 (H) 0 0 - 5 0 ratio   Microalbumin / creatinine urine ratio    Collection Time: 02/23/19  7:01 AM   Result Value Ref Range    Creatinine, Urine 70 7 Not Estab  mg/dL    Microalbum  ,U,Random <3 0 Not Estab  ug/mL    Microalb/Creat Ratio <4 2 0 0 - 30 0 mg/g creat   Hemoglobin A1C    Collection Time: 02/23/19  7:01 AM   Result Value Ref Range    Hemoglobin A1C 5 1 4 8 - 5 6 %    Estimated Average Glucose 100 mg/dL   TSH, 3rd generation with Free T4 reflex    Collection Time: 02/23/19  7:01 AM   Result Value Ref Range    TSH 5 090 (H) 0 450 - 4 500 uIU/mL   T4, free    Collection Time: 02/23/19  7:01 AM   Result Value Ref Range    T4,Free (Direct) 1 33 0 82 - 1 77 ng/dL       Assessment/Plan:    Patient's blood pressure is at goal, Continue all meds  LDL is wnl, but with other comorbid issues, will suggest low dose aspirin and statin  Patient is reporting intermittent episodes of substernal chest pain  Office EKG negative for any acute changes  Would suggest cardiac exercise stress test   Increased dose of levothyroxine 200 micrograms daily  Patient would like to repeat labs after 6 months/ call us back sooner continues to experience fatigue  He will be contacted with the results of the stress test   In the meantime if the chest pain persists or worsens then he needs to go to the ER  Diagnoses and all orders for this visit:    Hypothyroidism, unspecified type  -     levothyroxine 100 mcg tablet; Take 1 tablet (100 mcg total) by mouth daily  -     TSH, 3rd generation with Free T4 reflex; Future  -     TSH, 3rd generation with Free T4 reflex    Hyperlipidemia, unspecified hyperlipidemia type  -     rosuvastatin (CRESTOR) 5 mg tablet; Take 1 tablet (5 mg total) by mouth daily  -     Comprehensive metabolic panel; Future  -     Lipid panel;  Future  -     Comprehensive metabolic panel  - Lipid panel    Chest pain, unspecified type  -     POCT ECG  -     Stress test only, exercise; Future    Benign essential hypertension  -     hydrochlorothiazide (MICROZIDE) 12 5 mg capsule; Take 1 capsule (12 5 mg total) by mouth every morning  -     metoprolol succinate (TOPROL-XL) 50 mg 24 hr tablet; Take 1 tablet (50 mg total) by mouth daily  -     Stress test only, exercise; Future    Screen for colon cancer  -     Ambulatory referral to Gastroenterology; Future        BMI Counseling: Body mass index is 44 91 kg/m²  Discussed the patient's BMI with him  The BMI is above average  BMI counseling and education was provided to the patient  Nutrition recommendations include reducing portion sizes, decreasing overall calorie intake, 3-5 servings of fruits/vegetables daily, reducing fast food intake, consuming healthier snacks, decreasing soda and/or juice intake, moderation in carbohydrate intake, increasing intake of lean protein, reducing intake of saturated fat and trans fat and reducing intake of cholesterol  Exercise recommendations include moderate aerobic physical activity for 150 minutes/week  I have spent 40 minutes with Patient  today in which greater than 50% of this time was spent in counseling/coordination of care regarding Diagnostic results, Prognosis, Risks and benefits of tx options, Intructions for management, Patient and family education, Importance of tx compliance, Risk factor reductions and Impressions

## 2019-02-28 NOTE — PATIENT INSTRUCTIONS
Obesity   AMBULATORY CARE:   Obesity  is when your body mass index (BMI) is greater than 30  Your healthcare provider will use your height and weight to measure your BMI  The risks of obesity include  many health problems, such as injuries or physical disability  You may need tests to check for the following:  · Diabetes     · High blood pressure or high cholesterol     · Heart disease     · Gallbladder or liver disease     · Cancer of the colon, breast, prostate, liver, or kidney     · Sleep apnea     · Arthritis or gout  Seek care immediately if:   · You have a severe headache, confusion, or difficulty speaking  · You have weakness on one side of your body  · You have chest pain, sweating, or shortness of breath  Contact your healthcare provider if:   · You have symptoms of gallbladder or liver disease, such as pain in your upper abdomen  · You have knee or hip pain and discomfort while walking  · You have symptoms of diabetes, such as intense hunger and thirst, and frequent urination  · You have symptoms of sleep apnea, such as snoring or daytime sleepiness  · You have questions or concerns about your condition or care  Treatment for obesity  focuses on helping you lose weight to improve your health  Even a small decrease in BMI can reduce the risk for many health problems  Your healthcare provider will help you set a weight-loss goal   · Lifestyle changes  are the first step in treating obesity  These include making healthy food choices and getting regular physical activity  Your healthcare provider may suggest a weight-loss program that involves coaching, education, and therapy  · Medicine  may help you lose weight when it is used with a healthy diet and physical activity  · Surgery  can help you lose weight if you are very obese and have other health problems  There are several types of weight-loss surgery  Ask your healthcare provider for more information    Be successful losing weight:   · Set small, realistic goals  An example of a small goal is to walk for 20 minutes 5 days a week  Anther goal is to lose 5% of your body weight  · Tell friends, family members, and coworkers about your goals  and ask for their support  Ask a friend to lose weight with you, or join a weight-loss support group  · Identify foods or triggers that may cause you to overeat , and find ways to avoid them  Remove tempting high-calorie foods from your home and workplace  Place a bowl of fresh fruit on your kitchen counter  If stress causes you to eat, then find other ways to cope with stress  · Keep a diary to track what you eat and drink  Also write down how many minutes of physical activity you do each day  Weigh yourself once a week and record it in your diary  Eating changes: You will need to eat 500 to 1,000 fewer calories each day than you currently eat to lose 1 to 2 pounds a week  The following changes will help you cut calories:  · Eat smaller portions  Use small plates, no larger than 9 inches in diameter  Fill your plate half full of fruits and vegetables  Measure your food using measuring cups until you know what a serving size looks like  · Eat 3 meals and 1 or 2 snacks each day  Plan your meals in advance  Elen Negrete and eat at home most of the time  Eat slowly  · Eat fruits and vegetables at every meal   They are low in calories and high in fiber, which makes you feel full  Do not add butter, margarine, or cream sauce to vegetables  Use herbs to season steamed vegetables  · Eat less fat and fewer fried foods  Eat more baked or grilled chicken and fish  These protein sources are lower in calories and fat than red meat  Limit fast food  Dress your salads with olive oil and vinegar instead of bottled dressing  · Limit the amount of sugar you eat  Do not drink sugary beverages  Limit alcohol  Activity changes:  Physical activity is good for your body in many ways   It helps you burn calories and build strong muscles  It decreases stress and depression, and improves your mood  It can also help you sleep better  Talk to your healthcare provider before you begin an exercise program   · Exercise for at least 30 minutes 5 days a week  Start slowly  Set aside time each day for physical activity that you enjoy and that is convenient for you  It is best to do both weight training and an activity that increases your heart rate, such as walking, bicycling, or swimming  · Find ways to be more active  Do yard work and housecleaning  Walk up the stairs instead of using elevators  Spend your leisure time going to events that require walking, such as outdoor festivals or fairs  This extra physical activity can help you lose weight and keep it off  Follow up with your healthcare provider as directed: You may need to meet with a dietitian  Write down your questions so you remember to ask them during your visits  © 2017 [employer] Information is for End User's use only and may not be sold, redistributed or otherwise used for commercial purposes  All illustrations and images included in CareNotes® are the copyrighted property of A [employer]  or [employer]  The above information is an  only  It is not intended as medical advice for individual conditions or treatments  Talk to your doctor, nurse or pharmacist before following any medical regimen to see if it is safe and effective for you

## 2019-04-12 ENCOUNTER — HOSPITAL ENCOUNTER (OUTPATIENT)
Dept: NON INVASIVE DIAGNOSTICS | Facility: CLINIC | Age: 49
Discharge: HOME | End: 2019-04-12
Payer: COMMERCIAL

## 2019-04-12 DIAGNOSIS — R07.9 CHEST PAIN, UNSPECIFIED TYPE: ICD-10-CM

## 2019-04-12 DIAGNOSIS — I10 BENIGN ESSENTIAL HYPERTENSION: ICD-10-CM

## 2019-04-12 LAB
CHEST PAIN STATEMENT: NORMAL
MAX DIASTOLIC BP: 80 MMHG
MAX HEART RATE: 148 BPM
MAX PREDICTED HEART RATE: 170 BPM
MAX. SYSTOLIC BP: 206 MMHG
PROTOCOL NAME: NORMAL
REASON FOR TERMINATION: NORMAL
TARGET HR FORMULA: NORMAL
TEST INDICATION: NORMAL
TIME IN EXERCISE PHASE: NORMAL

## 2019-04-12 PROCEDURE — 93017 CV STRESS TEST TRACING ONLY: CPT

## 2019-04-12 PROCEDURE — 93018 CV STRESS TEST I&R ONLY: CPT | Performed by: INTERNAL MEDICINE

## 2019-04-12 PROCEDURE — 93016 CV STRESS TEST SUPVJ ONLY: CPT | Performed by: INTERNAL MEDICINE

## 2019-07-08 ENCOUNTER — OFFICE VISIT (OUTPATIENT)
Dept: URGENT CARE | Facility: CLINIC | Age: 49
End: 2019-07-08
Payer: COMMERCIAL

## 2019-07-08 VITALS
SYSTOLIC BLOOD PRESSURE: 154 MMHG | HEIGHT: 71 IN | WEIGHT: 290 LBS | OXYGEN SATURATION: 97 % | HEART RATE: 91 BPM | BODY MASS INDEX: 40.6 KG/M2 | DIASTOLIC BLOOD PRESSURE: 83 MMHG | TEMPERATURE: 98.3 F | RESPIRATION RATE: 16 BRPM

## 2019-07-08 DIAGNOSIS — R07.9 CHEST PAIN, UNSPECIFIED TYPE: ICD-10-CM

## 2019-07-08 DIAGNOSIS — M25.512 ACUTE PAIN OF LEFT SHOULDER: Primary | ICD-10-CM

## 2019-07-08 LAB
ATRIAL RATE: 62 BPM
P AXIS: 27 DEGREES
PR INTERVAL: 204 MS
QRS AXIS: -21 DEGREES
QRSD INTERVAL: 94 MS
QT INTERVAL: 378 MS
QTC INTERVAL: 383 MS
T WAVE AXIS: 3 DEGREES
VENTRICULAR RATE: 62 BPM

## 2019-07-08 PROCEDURE — 93010 ELECTROCARDIOGRAM REPORT: CPT | Performed by: INTERNAL MEDICINE

## 2019-07-08 PROCEDURE — 99213 OFFICE O/P EST LOW 20 MIN: CPT | Performed by: FAMILY MEDICINE

## 2019-07-08 PROCEDURE — 93005 ELECTROCARDIOGRAM TRACING: CPT | Performed by: FAMILY MEDICINE

## 2019-07-08 RX ORDER — IBUPROFEN 600 MG/1
600 TABLET ORAL EVERY 8 HOURS PRN
Qty: 20 TABLET | Refills: 0 | Status: SHIPPED | OUTPATIENT
Start: 2019-07-08 | End: 2019-09-12

## 2019-07-08 NOTE — PROGRESS NOTES
[address]        NAME: Vickie Richards is a 46 y o  male  : 1970    MRN: [id number]  DATE: 2019  TIME: 11:56 AM    Assessment and Plan   Acute pain of left shoulder [M25 512]  1  Acute pain of left shoulder  POCT ECG    ibuprofen (MOTRIN) 600 mg tablet   2  Chest pain, unspecified type  POCT ECG     Left pain chest and shoulder x5 days:  Per patient, exacerbating factors vary, sometimes on inspiration, movement, or sitting down  Currently symptom free, exam is benign  Given complained of left-sided chest pain, POC EKG was done which shows normal sinus rhythm with no ST changes  Symptoms might be due to musculoskeletal pain vs rotator cuff tendinitis  If symptoms persist, patient may benefit from imaging and physical therapy  Patient Instructions     Rest and apply ice on area for 15-20 mins every 3 hrs  Use ibuprofen as directed for pain and inflammation  Follow up with PCP in 3-5 days  Proceed to  ER if symptoms worsen  Chief Complaint     Chief Complaint   Patient presents with    Shoulder Pain     left shoulder pain x 5 days, denies numbness/tingling, taking advil which provides relief          History of Present Illness       Shoulder Pain    The pain is present in the left shoulder (Left shoulder and pectoralis region)  This is a new problem  The current episode started in the past 7 days  There has been no history of extremity trauma  The problem occurs intermittently  The problem has been unchanged  The quality of the pain is described as aching and sharp  Pertinent negatives include no fever, limited range of motion, numbness, stiffness or tingling  The symptoms are aggravated by activity, standing and lying down  He has tried NSAIDS (Patient has tried Aleve which helps relieve pain, but comes back after medication is worn out ) for the symptoms  The treatment provided moderate relief   Reports recent stress test 2 months ago       Review of Systems   Review of Systems Constitutional: Negative for fever  Respiratory: Negative for chest tightness and shortness of breath  Cardiovascular: Negative for palpitations  Musculoskeletal: Negative for stiffness  Neurological: Negative for tingling and numbness  Current Medications       Current Outpatient Medications:     hydrochlorothiazide (MICROZIDE) 12 5 mg capsule, Take 1 capsule (12 5 mg total) by mouth every morning, Disp: 90 capsule, Rfl: 1    ibuprofen (MOTRIN) 600 mg tablet, Take 1 tablet (600 mg total) by mouth every 8 (eight) hours as needed for mild pain or moderate pain, Disp: 20 tablet, Rfl: 0    levothyroxine 100 mcg tablet, Take 1 tablet (100 mcg total) by mouth daily, Disp: 90 tablet, Rfl: 1    metoprolol succinate (TOPROL-XL) 50 mg 24 hr tablet, Take 1 tablet (50 mg total) by mouth daily, Disp: 90 tablet, Rfl: 1    oxyCODONE-acetaminophen (PERCOCET) 5-325 mg per tablet, Take 1 tablet by mouth every 4 (four) hours as needed for moderate pain Max Daily Amount: 6 tablets, Disp: 20 tablet, Rfl: 0    rosuvastatin (CRESTOR) 5 mg tablet, Take 1 tablet (5 mg total) by mouth daily, Disp: 90 tablet, Rfl: 1    Current Allergies     Allergies as of 07/08/2019    (No Known Allergies)            The following portions of the patient's history were reviewed and updated as appropriate: allergies, current medications, past family history, past medical history, past social history, past surgical history and problem list      Past Medical History:   Diagnosis Date    Disease of thyroid gland     Hypertension        History reviewed  No pertinent surgical history  Family History   Problem Relation Age of Onset    Emphysema Mother     Bone cancer Mother    Leandro Roles Breast cancer Mother     Lung cancer Mother     Dementia Father     Stroke Father     Hyperlipidemia Father     Hypertension Father     Diabetes type II Father     Thyroid cancer Sister          Medications have been verified          Objective   BP 154/83   Pulse 91   Temp 98 3 °F (36 8 °C)   Resp 16   Ht 5' 11" (1 803 m)   Wt 132 kg (290 lb)   SpO2 97%   BMI 40 45 kg/m²        Physical Exam     Physical Exam   Constitutional: He is oriented to person, place, and time  He appears well-developed and well-nourished  No distress  HENT:   Head: Normocephalic and atraumatic  Eyes: Conjunctivae and EOM are normal    Neck: Normal range of motion  Neck supple  Cardiovascular: Normal rate  Pulmonary/Chest: Effort normal and breath sounds normal  No respiratory distress  He has no wheezes  He has no rales  He exhibits no tenderness  Musculoskeletal: Normal range of motion  He exhibits no edema, tenderness or deformity  Lymphadenopathy:     He has no cervical adenopathy  Neurological: He is alert and oriented to person, place, and time  Skin: Skin is warm and dry  No rash noted  No erythema  No pallor  Psychiatric: He has a normal mood and affect  Nursing note and vitals reviewed

## 2019-09-10 LAB
ALBUMIN SERPL-MCNC: 3.8 G/DL (ref 3.5–5.5)
ALBUMIN/GLOB SERPL: 1.3 {RATIO} (ref 1.2–2.2)
ALP SERPL-CCNC: 78 IU/L (ref 39–117)
ALT SERPL-CCNC: 35 IU/L (ref 0–44)
AST SERPL-CCNC: 23 IU/L (ref 0–40)
BILIRUB SERPL-MCNC: 0.3 MG/DL (ref 0–1.2)
BUN SERPL-MCNC: 15 MG/DL (ref 6–24)
BUN/CREAT SERPL: 15 (ref 9–20)
CALCIUM SERPL-MCNC: 9.1 MG/DL (ref 8.7–10.2)
CHLORIDE SERPL-SCNC: 101 MMOL/L (ref 96–106)
CHOLEST SERPL-MCNC: 173 MG/DL (ref 100–199)
CHOLEST/HDLC SERPL: 5.2 RATIO (ref 0–5)
CO2 SERPL-SCNC: 27 MMOL/L (ref 20–29)
CREAT SERPL-MCNC: 0.99 MG/DL (ref 0.76–1.27)
GLOBULIN SER-MCNC: 3 G/DL (ref 1.5–4.5)
GLUCOSE SERPL-MCNC: 94 MG/DL (ref 65–99)
HDLC SERPL-MCNC: 33 MG/DL
LDLC SERPL CALC-MCNC: 118 MG/DL (ref 0–99)
POTASSIUM SERPL-SCNC: 4.3 MMOL/L (ref 3.5–5.2)
PROT SERPL-MCNC: 6.8 G/DL (ref 6–8.5)
SL AMB EGFR AFRICAN AMERICAN: 102 ML/MIN/1.73
SL AMB EGFR NON AFRICAN AMERICAN: 88 ML/MIN/1.73
SL AMB VLDL CHOLESTEROL CALC: 22 MG/DL (ref 5–40)
SODIUM SERPL-SCNC: 141 MMOL/L (ref 134–144)
TRIGL SERPL-MCNC: 111 MG/DL (ref 0–149)
TSH SERPL DL<=0.005 MIU/L-ACNC: 3.52 UIU/ML (ref 0.45–4.5)

## 2019-09-12 ENCOUNTER — OFFICE VISIT (OUTPATIENT)
Dept: FAMILY MEDICINE CLINIC | Facility: CLINIC | Age: 49
End: 2019-09-12
Payer: COMMERCIAL

## 2019-09-12 VITALS
RESPIRATION RATE: 18 BRPM | BODY MASS INDEX: 44.1 KG/M2 | DIASTOLIC BLOOD PRESSURE: 84 MMHG | WEIGHT: 315 LBS | HEART RATE: 86 BPM | OXYGEN SATURATION: 99 % | HEIGHT: 71 IN | SYSTOLIC BLOOD PRESSURE: 136 MMHG

## 2019-09-12 DIAGNOSIS — R73.09 ABNORMAL BLOOD SUGAR: ICD-10-CM

## 2019-09-12 DIAGNOSIS — E03.9 HYPOTHYROIDISM, UNSPECIFIED TYPE: Primary | ICD-10-CM

## 2019-09-12 DIAGNOSIS — Z12.11 SCREEN FOR COLON CANCER: ICD-10-CM

## 2019-09-12 DIAGNOSIS — I10 BENIGN ESSENTIAL HYPERTENSION: ICD-10-CM

## 2019-09-12 DIAGNOSIS — E78.5 DYSLIPIDEMIA: ICD-10-CM

## 2019-09-12 PROBLEM — L72.3 INFECTED SEBACEOUS CYST: Status: RESOLVED | Noted: 2018-08-08 | Resolved: 2019-09-12

## 2019-09-12 PROBLEM — F43.22 ADJUSTMENT DISORDER WITH ANXIETY: Status: RESOLVED | Noted: 2017-08-31 | Resolved: 2019-09-12

## 2019-09-12 PROBLEM — R07.9 CHEST PAIN: Status: RESOLVED | Noted: 2019-02-28 | Resolved: 2019-09-12

## 2019-09-12 PROBLEM — L08.9 INFECTED SEBACEOUS CYST: Status: RESOLVED | Noted: 2018-08-08 | Resolved: 2019-09-12

## 2019-09-12 PROCEDURE — 99214 OFFICE O/P EST MOD 30 MIN: CPT | Performed by: FAMILY MEDICINE

## 2019-09-12 PROCEDURE — 3079F DIAST BP 80-89 MM HG: CPT | Performed by: FAMILY MEDICINE

## 2019-09-12 PROCEDURE — 3075F SYST BP GE 130 - 139MM HG: CPT | Performed by: FAMILY MEDICINE

## 2019-09-12 PROCEDURE — 3008F BODY MASS INDEX DOCD: CPT | Performed by: FAMILY MEDICINE

## 2019-09-12 RX ORDER — METOPROLOL SUCCINATE 50 MG/1
50 TABLET, EXTENDED RELEASE ORAL DAILY
Qty: 90 TABLET | Refills: 1 | Status: SHIPPED | OUTPATIENT
Start: 2019-09-12 | End: 2020-03-19 | Stop reason: SDUPTHER

## 2019-09-12 RX ORDER — ROSUVASTATIN CALCIUM 5 MG/1
5 TABLET, COATED ORAL DAILY
Qty: 90 TABLET | Refills: 1 | Status: SHIPPED | OUTPATIENT
Start: 2019-09-12 | End: 2020-03-19 | Stop reason: SDUPTHER

## 2019-09-12 RX ORDER — LEVOTHYROXINE SODIUM 0.1 MG/1
100 TABLET ORAL DAILY
Qty: 90 TABLET | Refills: 1 | Status: SHIPPED | OUTPATIENT
Start: 2019-09-12 | End: 2020-03-19 | Stop reason: SDUPTHER

## 2019-09-12 RX ORDER — HYDROCHLOROTHIAZIDE 12.5 MG/1
12.5 CAPSULE, GELATIN COATED ORAL EVERY MORNING
Qty: 90 CAPSULE | Refills: 1 | Status: SHIPPED | OUTPATIENT
Start: 2019-09-12 | End: 2020-03-19 | Stop reason: SDUPTHER

## 2019-09-12 NOTE — ASSESSMENT & PLAN NOTE
Cholesterol continues to be borderline high  Different cholesterol-lowering options discussed with patient  Patient wants to start statin  Started on low dose of Crestor    Suggested CMP at 3 and 6 month interval   Will follow up after 6 months with labs/sooner if he experiences any side effects on the medication

## 2019-09-12 NOTE — ASSESSMENT & PLAN NOTE
Patient's glycemic control has improved with diet modifications  Continue same    Continue monitoring with labs at 6 month interval

## 2019-09-12 NOTE — PROGRESS NOTES
Subjective:      Patient ID: Aravind Roper is a 46 y o  male  Hypertension   This is a chronic problem  The current episode started more than 1 year ago  The problem is controlled  Pertinent negatives include no blurred vision, chest pain, headaches, palpitations, peripheral edema or shortness of breath  Risk factors for coronary artery disease include dyslipidemia, male gender and obesity  Treatments tried: hctz 12 5mg, metoprolol xl 50 mg daily  The current treatment provides significant improvement  There are no compliance problems  Identifiable causes of hypertension include a thyroid problem  Thyroid Problem   Presents for follow-up visit  Patient reports no depressed mood, fatigue, hoarse voice, leg swelling, palpitations, tremors or visual change  The symptoms have been stable (100 mcg on levothyroxine)  His past medical history is significant for hyperlipidemia  Hyperlipidemia   This is a chronic problem  The current episode started more than 1 year ago  The problem is controlled  Lipid results: Borderline high  Pertinent negatives include no chest pain, myalgias or shortness of breath  Current antihyperlipidemic treatment includes diet change and exercise  The current treatment provides no improvement of lipids  There are no compliance problems  Risk factors for coronary artery disease include dyslipidemia, hypertension and male sex  Past Medical History:   Diagnosis Date    Disease of thyroid gland     Hypertension        Family History   Problem Relation Age of Onset    Emphysema Mother     Bone cancer Mother    [facility] Breast cancer Mother     Lung cancer Mother    [facility] Mor[facility] Dementia Father     Stroke Father     Hyperlipidemia Father     Hypertension Father     Diabetes type II Father     Thyroid cancer Sister        History reviewed  No pertinent surgical history  reports that he has never smoked  He has never used smokeless tobacco  He reports that he drinks alcohol   He reports that he does not use drugs  Current Outpatient Medications:     hydrochlorothiazide (MICROZIDE) 12 5 mg capsule, Take 1 capsule (12 5 mg total) by mouth every morning, Disp: 90 capsule, Rfl: 1    levothyroxine 100 mcg tablet, Take 1 tablet (100 mcg total) by mouth daily, Disp: 90 tablet, Rfl: 1    metoprolol succinate (TOPROL-XL) 50 mg 24 hr tablet, Take 1 tablet (50 mg total) by mouth daily, Disp: 90 tablet, Rfl: 1    rosuvastatin (CRESTOR) 5 mg tablet, Take 1 tablet (5 mg total) by mouth daily, Disp: 90 tablet, Rfl: 1    The following portions of the patient's history were reviewed and updated as appropriate: allergies, current medications, past family history, past medical history, past social history, past surgical history and problem list     Review of Systems   Constitutional: Negative  Negative for fatigue  HENT: Negative for hoarse voice  Eyes: Negative for blurred vision  Respiratory: Negative  Negative for shortness of breath  Cardiovascular: Negative  Negative for chest pain and palpitations  Gastrointestinal: Negative  Musculoskeletal: Negative  Negative for myalgias  Neurological: Negative  Negative for tremors and headaches  Psychiatric/Behavioral: Negative  Objective:    /84 (BP Location: Right arm, Patient Position: Sitting, Cuff Size: Large)   Pulse 86   Resp 18   Ht 5' 11" (1 803 m)   Wt (!) 146 kg (322 lb)   SpO2 99%   BMI 44 91 kg/m²      Physical Exam   Constitutional: He is oriented to person, place, and time  He appears well-developed and well-nourished  Cardiovascular: Normal rate, regular rhythm and normal heart sounds  Pulmonary/Chest: Effort normal and breath sounds normal    Abdominal: Soft  Bowel sounds are normal    Neurological: He is alert and oriented to person, place, and time     Psychiatric: His behavior is normal  Judgment normal          Recent Results (from the past 1008 hour(s))   Comprehensive metabolic panel    Collection Time: 09/09/19  6:54 AM   Result Value Ref Range    Glucose, Random 94 65 - 99 mg/dL    BUN 15 6 - 24 mg/dL    Creatinine 0 99 0 76 - 1 27 mg/dL    eGFR Non  88 >59 mL/min/1 73    eGFR  102 >59 mL/min/1 73    SL AMB BUN/CREATININE RATIO 15 9 - 20    Sodium 141 134 - 144 mmol/L    Potassium 4 3 3 5 - 5 2 mmol/L    Chloride 101 96 - 106 mmol/L    CO2 27 20 - 29 mmol/L    CALCIUM 9 1 8 7 - 10 2 mg/dL    Protein, Total 6 8 6 0 - 8 5 g/dL    Albumin 3 8 3 5 - 5 5 g/dL    Globulin, Total 3 0 1 5 - 4 5 g/dL    Albumin/Globulin Ratio 1 3 1 2 - 2 2    TOTAL BILIRUBIN 0 3 0 0 - 1 2 mg/dL    Alk Phos Isoenzymes 78 39 - 117 IU/L    AST 23 0 - 40 IU/L    ALT 35 0 - 44 IU/L   Lipid panel    Collection Time: 09/09/19  6:54 AM   Result Value Ref Range    Cholesterol, Total 173 100 - 199 mg/dL    Triglycerides 111 0 - 149 mg/dL    HDL 33 (L) >39 mg/dL    VLDL Cholesterol Calculated 22 5 - 40 mg/dL    LDL Direct 118 (H) 0 - 99 mg/dL    T  Chol/HDL Ratio 5 2 (H) 0 0 - 5 0 ratio   TSH, 3rd generation with Free T4 reflex    Collection Time: 09/09/19  6:54 AM   Result Value Ref Range    TSH 3 520 0 450 - 4 500 uIU/mL       Assessment/Plan:         Problem List Items Addressed This Visit        Endocrine    Hypothyroidism - Primary     TSH stable on levothyroxine 100 microgram   Continue same  Recheck thyroid after 6 months         Relevant Medications    levothyroxine 100 mcg tablet    metoprolol succinate (TOPROL-XL) 50 mg 24 hr tablet    Other Relevant Orders    TSH, 3rd generation with Free T4 reflex       Cardiovascular and Mediastinum    Benign essential hypertension     Blood pressure is stable on hydrochlorothiazide 12 5, metoprolol XL 50 milligram   Continue same           Relevant Medications    hydrochlorothiazide (MICROZIDE) 12 5 mg capsule    metoprolol succinate (TOPROL-XL) 50 mg 24 hr tablet    Other Relevant Orders    Comprehensive metabolic panel    Microalbumin / creatinine urine ratio Other    Abnormal blood sugar     Patient's glycemic control has improved with diet modifications  Continue same  Continue monitoring with labs at 6 month interval          Dyslipidemia     Cholesterol continues to be borderline high  Different cholesterol-lowering options discussed with patient  Patient wants to start statin  Started on low dose of Crestor    Suggested CMP at 3 and 6 month interval   Will follow up after 6 months with labs/sooner if he experiences any side effects on the medication         Relevant Medications    rosuvastatin (CRESTOR) 5 mg tablet    Other Relevant Orders    Comprehensive metabolic panel    Lipid panel    Screen for colon cancer    Relevant Orders    Ambulatory referral to Gastroenterology

## 2020-01-20 DIAGNOSIS — I10 BENIGN ESSENTIAL HYPERTENSION: ICD-10-CM

## 2020-01-20 RX ORDER — HYDROCHLOROTHIAZIDE 12.5 MG/1
12.5 CAPSULE, GELATIN COATED ORAL EVERY MORNING
Qty: 90 CAPSULE | Refills: 1 | Status: CANCELLED | OUTPATIENT
Start: 2020-01-20

## 2020-01-20 RX ORDER — METOPROLOL SUCCINATE 50 MG/1
50 TABLET, EXTENDED RELEASE ORAL DAILY
Qty: 90 TABLET | Refills: 1 | Status: CANCELLED | OUTPATIENT
Start: 2020-01-20

## 2020-01-20 NOTE — TELEPHONE ENCOUNTER
Patient is going to count his pills when he gets home  He should be good until the beginning of March according to his refill in September

## 2020-01-20 NOTE — TELEPHONE ENCOUNTER
Catalina Peter came in with daughter for appointment  He has an appointment in march, doesn't believe medication will last till then   Needs a refill of hydrochlorothiazide, and toprol-xl to last till appointment in march  Rx can go to [address] in [place]

## 2020-03-17 LAB
ALBUMIN SERPL-MCNC: 4.2 G/DL (ref 3.8–4.9)
ALBUMIN/CREAT UR: 5 MG/G CREAT (ref 0–29)
ALBUMIN/GLOB SERPL: 1.4 {RATIO} (ref 1.2–2.2)
ALP SERPL-CCNC: 78 IU/L (ref 39–117)
ALT SERPL-CCNC: 31 IU/L (ref 0–44)
AST SERPL-CCNC: 22 IU/L (ref 0–40)
BILIRUB SERPL-MCNC: 0.4 MG/DL (ref 0–1.2)
BUN SERPL-MCNC: 14 MG/DL (ref 6–24)
BUN/CREAT SERPL: 16 (ref 9–20)
CALCIUM SERPL-MCNC: 9.2 MG/DL (ref 8.7–10.2)
CHLORIDE SERPL-SCNC: 99 MMOL/L (ref 96–106)
CHOLEST SERPL-MCNC: 190 MG/DL (ref 100–199)
CHOLEST/HDLC SERPL: 5.1 RATIO (ref 0–5)
CO2 SERPL-SCNC: 25 MMOL/L (ref 20–29)
CREAT SERPL-MCNC: 0.9 MG/DL (ref 0.76–1.27)
CREAT UR-MCNC: 62.2 MG/DL
GLOBULIN SER-MCNC: 3 G/DL (ref 1.5–4.5)
GLUCOSE SERPL-MCNC: 98 MG/DL (ref 65–99)
HDLC SERPL-MCNC: 37 MG/DL
LDLC SERPL CALC-MCNC: 121 MG/DL (ref 0–99)
MICROALBUMIN UR-MCNC: 3 UG/ML
POTASSIUM SERPL-SCNC: 3.8 MMOL/L (ref 3.5–5.2)
PROT SERPL-MCNC: 7.2 G/DL (ref 6–8.5)
SL AMB EGFR AFRICAN AMERICAN: 114 ML/MIN/1.73
SL AMB EGFR NON AFRICAN AMERICAN: 99 ML/MIN/1.73
SL AMB VLDL CHOLESTEROL CALC: 32 MG/DL (ref 5–40)
SODIUM SERPL-SCNC: 138 MMOL/L (ref 134–144)
TRIGL SERPL-MCNC: 160 MG/DL (ref 0–149)
TSH SERPL DL<=0.005 MIU/L-ACNC: 3.03 UIU/ML (ref 0.45–4.5)

## 2020-03-19 ENCOUNTER — OFFICE VISIT (OUTPATIENT)
Dept: FAMILY MEDICINE CLINIC | Facility: CLINIC | Age: 50
End: 2020-03-19
Payer: COMMERCIAL

## 2020-03-19 VITALS
HEART RATE: 76 BPM | SYSTOLIC BLOOD PRESSURE: 130 MMHG | BODY MASS INDEX: 44.1 KG/M2 | HEIGHT: 71 IN | WEIGHT: 315 LBS | OXYGEN SATURATION: 99 % | DIASTOLIC BLOOD PRESSURE: 90 MMHG | RESPIRATION RATE: 14 BRPM | TEMPERATURE: 97.6 F

## 2020-03-19 DIAGNOSIS — E78.5 HYPERLIPIDEMIA, UNSPECIFIED HYPERLIPIDEMIA TYPE: ICD-10-CM

## 2020-03-19 DIAGNOSIS — I10 BENIGN ESSENTIAL HYPERTENSION: Primary | ICD-10-CM

## 2020-03-19 DIAGNOSIS — E66.01 CLASS 3 SEVERE OBESITY DUE TO EXCESS CALORIES WITHOUT SERIOUS COMORBIDITY WITH BODY MASS INDEX (BMI) OF 40.0 TO 44.9 IN ADULT (HCC): ICD-10-CM

## 2020-03-19 DIAGNOSIS — E03.9 HYPOTHYROIDISM, UNSPECIFIED TYPE: ICD-10-CM

## 2020-03-19 DIAGNOSIS — E78.5 DYSLIPIDEMIA: ICD-10-CM

## 2020-03-19 PROCEDURE — 3008F BODY MASS INDEX DOCD: CPT | Performed by: FAMILY MEDICINE

## 2020-03-19 PROCEDURE — 1036F TOBACCO NON-USER: CPT | Performed by: FAMILY MEDICINE

## 2020-03-19 PROCEDURE — 3080F DIAST BP >= 90 MM HG: CPT | Performed by: FAMILY MEDICINE

## 2020-03-19 PROCEDURE — 99214 OFFICE O/P EST MOD 30 MIN: CPT | Performed by: FAMILY MEDICINE

## 2020-03-19 PROCEDURE — 3075F SYST BP GE 130 - 139MM HG: CPT | Performed by: FAMILY MEDICINE

## 2020-03-19 RX ORDER — ROSUVASTATIN CALCIUM 5 MG/1
5 TABLET, COATED ORAL DAILY
Qty: 90 TABLET | Refills: 1 | Status: SHIPPED | OUTPATIENT
Start: 2020-03-19 | End: 2020-07-23 | Stop reason: SDUPTHER

## 2020-03-19 RX ORDER — LEVOTHYROXINE SODIUM 0.1 MG/1
100 TABLET ORAL DAILY
Qty: 90 TABLET | Refills: 1 | Status: SHIPPED | OUTPATIENT
Start: 2020-03-19 | End: 2020-07-23 | Stop reason: SDUPTHER

## 2020-03-19 RX ORDER — HYDROCHLOROTHIAZIDE 12.5 MG/1
12.5 CAPSULE, GELATIN COATED ORAL EVERY MORNING
Qty: 90 CAPSULE | Refills: 1 | Status: SHIPPED | OUTPATIENT
Start: 2020-03-19 | End: 2020-07-23 | Stop reason: SDUPTHER

## 2020-03-19 RX ORDER — METOPROLOL SUCCINATE 50 MG/1
50 TABLET, EXTENDED RELEASE ORAL DAILY
Qty: 90 TABLET | Refills: 1 | Status: SHIPPED | OUTPATIENT
Start: 2020-03-19 | End: 2020-07-23 | Stop reason: SDUPTHER

## 2020-03-19 NOTE — ASSESSMENT & PLAN NOTE
Patient's blood pressure is borderline high  Patient states that he is stressed due to the [place]  Patient will monitor his blood pressure at home and call us back after 2 weeks with blood pressure value

## 2020-03-19 NOTE — ASSESSMENT & PLAN NOTE
Advised diet modifications  Continue Crestor  Recheck lipids after 3 months in follow-up thereafter

## 2020-03-19 NOTE — PROGRESS NOTES
Subjective:      Patient ID: Chauncey Lara is a 44 y o  male  Hypertension   This is a chronic problem  The current episode started more than 1 year ago  The problem is uncontrolled  Pertinent negatives include no blurred vision, chest pain, palpitations, peripheral edema or shortness of breath  Risk factors for coronary artery disease include dyslipidemia, male gender and obesity  Treatments tried: mETOPROLOL XL 50, HCTZ 12 5 MG DAILY  The current treatment provides moderate improvement  There are no compliance problems  There is no history of a thyroid problem  Hyperlipidemia   This is a chronic problem  The current episode started more than 1 year ago  The problem is uncontrolled  Recent lipid tests were reviewed and are high (Elevated triglycerides)  Pertinent negatives include no chest pain, focal sensory loss, myalgias or shortness of breath  Current antihyperlipidemic treatment includes statins  The current treatment provides moderate improvement of lipids  Compliance problems include adherence to diet and adherence to exercise  Thyroid Problem   Presents for follow-up visit  Patient reports no constipation, depressed mood, heat intolerance, leg swelling, palpitations or tremors  The symptoms have been stable (100 micrograms levothyroxine)  His past medical history is significant for hyperlipidemia  Past Medical History:   Diagnosis Date    Disease of thyroid gland     Hypertension        Family History   Problem Relation Age of Onset    Emphysema Mother     Bone cancer Mother    [facility] Breast cancer Mother     Lung cancer Mother    [facility] Dementia Father     Stroke Father     Hyperlipidemia Father     Hypertension Father     Diabetes type II Father     Thyroid cancer Sister        History reviewed  No pertinent surgical history  reports that he has never smoked  He has never used smokeless tobacco  He reports that he drinks alcohol  He reports that he does not use drugs        Current Outpatient Medications:     hydrochlorothiazide (MICROZIDE) 12 5 mg capsule, Take 1 capsule (12 5 mg total) by mouth every morning, Disp: 90 capsule, Rfl: 1    levothyroxine 100 mcg tablet, Take 1 tablet (100 mcg total) by mouth daily, Disp: 90 tablet, Rfl: 1    metoprolol succinate (TOPROL-XL) 50 mg 24 hr tablet, Take 1 tablet (50 mg total) by mouth daily, Disp: 90 tablet, Rfl: 1    rosuvastatin (CRESTOR) 5 mg tablet, Take 1 tablet (5 mg total) by mouth daily, Disp: 90 tablet, Rfl: 1    The following portions of the patient's history were reviewed and updated as appropriate: allergies, current medications, past family history, past medical history, past social history, past surgical history and problem list     Review of Systems   Constitutional: Negative  Eyes: Negative for blurred vision  Respiratory: Negative  Negative for shortness of breath  Cardiovascular: Negative  Negative for chest pain and palpitations  Gastrointestinal: Negative  Negative for constipation  Endocrine: Negative  Negative for heat intolerance  Genitourinary: Negative  Musculoskeletal: Negative  Negative for myalgias  Allergic/Immunologic: Negative  Neurological: Negative  Negative for tremors  Psychiatric/Behavioral: Negative  Objective:    /90   Pulse 76   Temp 97 6 °F (36 4 °C)   Resp 14   Ht 5' 11" (1 803 m)   Wt (!) 143 kg (315 lb)   SpO2 99%   BMI 43 93 kg/m²      Physical Exam   Constitutional: He is oriented to person, place, and time  He appears well-developed and well-nourished  HENT:   Mouth/Throat: Oropharynx is clear and moist  No oropharyngeal exudate  Eyes: Pupils are equal, round, and reactive to light  Neck: Normal range of motion  Cardiovascular: Normal rate and regular rhythm  Pulmonary/Chest: Effort normal and breath sounds normal    Abdominal: Soft  Bowel sounds are normal    Musculoskeletal: Normal range of motion     Neurological: He is alert and oriented to person, place, and time  Psychiatric: His behavior is normal  Judgment normal          Recent Results (from the past 1008 hour(s))   Comprehensive metabolic panel    Collection Time: 03/16/20  6:47 AM   Result Value Ref Range    Glucose, Random 98 65 - 99 mg/dL    BUN 14 6 - 24 mg/dL    Creatinine 0 90 0 76 - 1 27 mg/dL    eGFR Non African American 99 >59 mL/min/1 73    eGFR  114 >59 mL/min/1 73    SL AMB BUN/CREATININE RATIO 16 9 - 20    Sodium 138 134 - 144 mmol/L    Potassium 3 8 3 5 - 5 2 mmol/L    Chloride 99 96 - 106 mmol/L    CO2 25 20 - 29 mmol/L    CALCIUM 9 2 8 7 - 10 2 mg/dL    Protein, Total 7 2 6 0 - 8 5 g/dL    Albumin 4 2 3 8 - 4 9 g/dL    Globulin, Total 3 0 1 5 - 4 5 g/dL    Albumin/Globulin Ratio 1 4 1 2 - 2 2    TOTAL BILIRUBIN 0 4 0 0 - 1 2 mg/dL    Alk Phos Isoenzymes 78 39 - 117 IU/L    AST 22 0 - 40 IU/L    ALT 31 0 - 44 IU/L   Lipid panel    Collection Time: 03/16/20  6:47 AM   Result Value Ref Range    Cholesterol, Total 190 100 - 199 mg/dL    Triglycerides 160 (H) 0 - 149 mg/dL    HDL 37 (L) >39 mg/dL    VLDL Cholesterol Calculated 32 5 - 40 mg/dL    LDL Direct 121 (H) 0 - 99 mg/dL    T  Chol/HDL Ratio 5 1 (H) 0 0 - 5 0 ratio   TSH, 3rd generation with Free T4 reflex    Collection Time: 03/16/20  6:47 AM   Result Value Ref Range    TSH 3 030 0 450 - 4 500 uIU/mL   Microalbumin / creatinine urine ratio    Collection Time: 03/16/20  6:47 AM   Result Value Ref Range    Creatinine, Urine 62 2 Not Estab  mg/dL    Microalbum  ,U,Random 3 0 Not Estab  ug/mL    Microalb/Creat Ratio 5 0 - 29 mg/g creat       Assessment/Plan:             Problem List Items Addressed This Visit        Endocrine    Hypothyroidism     Stable on levothyroxine 100 micrograms  Continue same  TSH after 6 months           Relevant Medications    levothyroxine 100 mcg tablet    metoprolol succinate (TOPROL-XL) 50 mg 24 hr tablet       Cardiovascular and Mediastinum    Benign essential hypertension - Primary     Patient's blood pressure is borderline high  Patient states that he is stressed due to the [place]  Patient will monitor his blood pressure at home and call us back after 2 weeks with blood pressure value  Relevant Medications    hydrochlorothiazide (MICROZIDE) 12 5 mg capsule    metoprolol succinate (TOPROL-XL) 50 mg 24 hr tablet       Other    Dyslipidemia     Advised diet modifications  Continue Crestor  Recheck lipids after 3 months in follow-up thereafter  Relevant Medications    rosuvastatin (CRESTOR) 5 mg tablet    Obesity    Hyperlipidemia    Relevant Medications    rosuvastatin (CRESTOR) 5 mg tablet    Other Relevant Orders    Comprehensive metabolic panel    Lipid panel        BMI Counseling: Body mass index is 43 93 kg/m²  The BMI is above normal  Nutrition recommendations include reducing portion sizes, decreasing overall calorie intake, 3-5 servings of fruits/vegetables daily, reducing fast food intake, consuming healthier snacks, decreasing soda and/or juice intake, moderation in carbohydrate intake, increasing intake of lean protein, reducing intake of saturated fat and trans fat and reducing intake of cholesterol  Exercise recommendations include moderate aerobic physical activity for 150 minutes/week

## 2020-07-02 ENCOUNTER — TELEPHONE (OUTPATIENT)
Dept: GASTROENTEROLOGY | Facility: CLINIC | Age: 50
End: 2020-07-02

## 2020-07-02 ENCOUNTER — PREP FOR PROCEDURE (OUTPATIENT)
Dept: GASTROENTEROLOGY | Facility: CLINIC | Age: 50
End: 2020-07-02

## 2020-07-02 DIAGNOSIS — Z12.11 SCREENING FOR COLON CANCER: Primary | ICD-10-CM

## 2020-07-02 DIAGNOSIS — Z20.822 ENCOUNTER FOR LABORATORY TESTING FOR COVID-19 VIRUS: ICD-10-CM

## 2020-07-02 DIAGNOSIS — Z12.11 SCREEN FOR COLON CANCER: Primary | ICD-10-CM

## 2020-07-02 NOTE — TELEPHONE ENCOUNTER
03/17/20  Screened by: Chrisandra Hodgkin    Referring Provider Sameera Hammond 47: If patient answers NO to medical questions, then schedule procedure  If patient answers YES to medical questions, then schedule office appointment  Previous Colonoscopy no  Date and Facility of last colonoscopy? Comments: Passed OA and would like to be scheduled in [place]  Patient was advised procedure scheduling out to May or June due to COVID 19  Patient can be reached at [phone]        Pre- Screening: Body mass index is 44 91 kg/m²  Has patient been referred for a routine screening Colonoscopy? yes  Is the patient between 39-70 years old? yes    SCHEDULING STAFF   If the patient is between 45yrs-49yrs, please advise patient to confirm benefits/coverage with their insurance company for a routine screening colonoscopy, some insurance carriers will only cover at [phone] or older   If the patient is over 76years old, please schedule an office visit   If the patient had a previous colonoscopy send to the procedure  before continuing    Have you been diagnosed with a bleeding disorder or anemia? no    Do you take no    Have you been diagnosed with Diabetes or are you taking any   Diabetic medications? no    Do you have any of the following symptoms?  no    Have you had a coronary or vascular stent within the last year? no    Have you had a heart attack or stroke in the last 6 months? no    Have you had intestinal surgery in the last 3 months? no    Do you have problems with: no    Do you use: no    Have you been hospitalized in the last Month? no    Have you had chest pain (angina) or breathing problems  (COPD) in the last 3 months? no    Do you have any difficulty walking up a flight of stairs? no    Have you had Kidney failure or insufficiency? no    Have you had heart valve surgery? no    Are you confined to a wheelchair?  no

## 2020-07-02 NOTE — TELEPHONE ENCOUNTER
Called pt, rescreened for OA colonoscopy  Pt passed again  Scheduled colonoscopy on 7/25 at [address]  Pt is aware to get covid testing done on 7/20  Reviewed prep with pt, reminded needs a  and will get a call the day before with the arrival time  Emailed prep instructions and covid information  Pt is scheduled and covid testing is ordered  Please send Suprep to pt's pharmacy  Thank you!

## 2020-07-18 LAB
ALBUMIN SERPL-MCNC: 3.9 G/DL (ref 3.8–4.9)
ALBUMIN/GLOB SERPL: 1.3 {RATIO} (ref 1.2–2.2)
ALP SERPL-CCNC: 76 IU/L (ref 39–117)
ALT SERPL-CCNC: 30 IU/L (ref 0–44)
AST SERPL-CCNC: 23 IU/L (ref 0–40)
BILIRUB SERPL-MCNC: 0.3 MG/DL (ref 0–1.2)
BUN SERPL-MCNC: 19 MG/DL (ref 6–24)
BUN/CREAT SERPL: 18 (ref 9–20)
CALCIUM SERPL-MCNC: 8.7 MG/DL (ref 8.7–10.2)
CHLORIDE SERPL-SCNC: 101 MMOL/L (ref 96–106)
CHOLEST SERPL-MCNC: 190 MG/DL (ref 100–199)
CHOLEST/HDLC SERPL: 5.4 RATIO (ref 0–5)
CO2 SERPL-SCNC: 24 MMOL/L (ref 20–29)
CREAT SERPL-MCNC: 1.06 MG/DL (ref 0.76–1.27)
GLOBULIN SER-MCNC: 3 G/DL (ref 1.5–4.5)
GLUCOSE SERPL-MCNC: 101 MG/DL (ref 65–99)
HDLC SERPL-MCNC: 35 MG/DL
LDLC SERPL CALC-MCNC: 135 MG/DL (ref 0–99)
POTASSIUM SERPL-SCNC: 4.1 MMOL/L (ref 3.5–5.2)
PROT SERPL-MCNC: 6.9 G/DL (ref 6–8.5)
SL AMB EGFR AFRICAN AMERICAN: 93 ML/MIN/1.73
SL AMB EGFR NON AFRICAN AMERICAN: 81 ML/MIN/1.73
SL AMB VLDL CHOLESTEROL CALC: 20 MG/DL (ref 5–40)
SODIUM SERPL-SCNC: 140 MMOL/L (ref 134–144)
TRIGL SERPL-MCNC: 102 MG/DL (ref 0–149)

## 2020-07-20 DIAGNOSIS — Z20.822 ENCOUNTER FOR LABORATORY TESTING FOR COVID-19 VIRUS: ICD-10-CM

## 2020-07-20 PROCEDURE — U0003 INFECTIOUS AGENT DETECTION BY NUCLEIC ACID (DNA OR RNA); SEVERE ACUTE RESPIRATORY SYNDROME CORONAVIRUS 2 (SARS-COV-2) (CORONAVIRUS DISEASE [COVID-19]), AMPLIFIED PROBE TECHNIQUE, MAKING USE OF HIGH THROUGHPUT TECHNOLOGIES AS DESCRIBED BY CMS-2020-01-R: HCPCS

## 2020-07-23 ENCOUNTER — TELEMEDICINE (OUTPATIENT)
Dept: FAMILY MEDICINE CLINIC | Facility: CLINIC | Age: 50
End: 2020-07-23

## 2020-07-23 ENCOUNTER — TELEPHONE (OUTPATIENT)
Dept: GASTROENTEROLOGY | Facility: AMBULARY SURGERY CENTER | Age: 50
End: 2020-07-23

## 2020-07-23 VITALS
DIASTOLIC BLOOD PRESSURE: 77 MMHG | WEIGHT: 310 LBS | HEIGHT: 71 IN | BODY MASS INDEX: 43.4 KG/M2 | HEART RATE: 72 BPM | SYSTOLIC BLOOD PRESSURE: 113 MMHG

## 2020-07-23 DIAGNOSIS — F41.9 ANXIETY: ICD-10-CM

## 2020-07-23 DIAGNOSIS — E03.9 HYPOTHYROIDISM, UNSPECIFIED TYPE: ICD-10-CM

## 2020-07-23 DIAGNOSIS — I10 BENIGN ESSENTIAL HYPERTENSION: Primary | ICD-10-CM

## 2020-07-23 DIAGNOSIS — E78.5 DYSLIPIDEMIA: ICD-10-CM

## 2020-07-23 DIAGNOSIS — Z12.5 PROSTATE CANCER SCREENING: ICD-10-CM

## 2020-07-23 DIAGNOSIS — R73.09 ABNORMAL BLOOD SUGAR: ICD-10-CM

## 2020-07-23 PROCEDURE — 99214 OFFICE O/P EST MOD 30 MIN: CPT | Performed by: FAMILY MEDICINE

## 2020-07-23 PROCEDURE — 3074F SYST BP LT 130 MM HG: CPT | Performed by: FAMILY MEDICINE

## 2020-07-23 PROCEDURE — 3008F BODY MASS INDEX DOCD: CPT | Performed by: FAMILY MEDICINE

## 2020-07-23 PROCEDURE — 1036F TOBACCO NON-USER: CPT | Performed by: FAMILY MEDICINE

## 2020-07-23 PROCEDURE — 3078F DIAST BP <80 MM HG: CPT | Performed by: FAMILY MEDICINE

## 2020-07-23 RX ORDER — ROSUVASTATIN CALCIUM 5 MG/1
5 TABLET, COATED ORAL DAILY
Qty: 90 TABLET | Refills: 1 | Status: SHIPPED | OUTPATIENT
Start: 2020-07-23 | End: 2021-05-13

## 2020-07-23 RX ORDER — METOPROLOL SUCCINATE 50 MG/1
50 TABLET, EXTENDED RELEASE ORAL DAILY
Qty: 90 TABLET | Refills: 1 | Status: SHIPPED | OUTPATIENT
Start: 2020-07-23 | End: 2021-05-05

## 2020-07-23 RX ORDER — ALPRAZOLAM 0.25 MG/1
0.25 TABLET ORAL
Qty: 30 TABLET | Refills: 0 | Status: SHIPPED | OUTPATIENT
Start: 2020-07-23

## 2020-07-23 RX ORDER — HYDROCHLOROTHIAZIDE 12.5 MG/1
12.5 CAPSULE, GELATIN COATED ORAL EVERY MORNING
Qty: 90 CAPSULE | Refills: 1 | Status: SHIPPED | OUTPATIENT
Start: 2020-07-23 | End: 2021-05-05

## 2020-07-23 RX ORDER — LEVOTHYROXINE SODIUM 0.1 MG/1
100 TABLET ORAL DAILY
Qty: 90 TABLET | Refills: 1 | Status: SHIPPED | OUTPATIENT
Start: 2020-07-23 | End: 2021-04-23

## 2020-07-23 NOTE — ASSESSMENT & PLAN NOTE
Improved significantly with diet modifications    Continue metoprolol XL 50 milligram, hydrochlorothiazide 12 5 milligram

## 2020-07-23 NOTE — ASSESSMENT & PLAN NOTE
Due to COVID pandemic  Patient requesting Xanax  PDMP checked  Patient offered therapy but he declines  Will follow if need for Xanax increases  Patient made aware of the common side effects of the medication

## 2020-07-23 NOTE — TELEPHONE ENCOUNTER
Pt called   He spoke to his insurance co  BC/BS of [facility] at [facility] pt is covered for 1 colonoscopy every 5 years for a routine screening 100%  Pt's ins includes out of state coverage  Ref# for the call [id number] per pt   Pt is on the schedule for Sat  7/25/2020 w/ dr Elizabeth Olmstead at [facility]

## 2020-07-23 NOTE — PROGRESS NOTES
Virtual Regular Visit      Assessment/Plan:    Problem List Items Addressed This Visit        Endocrine    Hypothyroidism     Stable on levothyroxine 100 micrograms  Continue same  Relevant Medications    levothyroxine 100 mcg tablet    metoprolol succinate (TOPROL-XL) 50 mg 24 hr tablet    Other Relevant Orders    TSH, 3rd generation with Free T4 reflex       Cardiovascular and Mediastinum    Benign essential hypertension - Primary     Improved significantly with diet modifications  Continue metoprolol XL 50 milligram, hydrochlorothiazide 12 5 milligram          Relevant Medications    hydrochlorothiazide (MICROZIDE) 12 5 mg capsule    metoprolol succinate (TOPROL-XL) 50 mg 24 hr tablet    Other Relevant Orders    Microalbumin / creatinine urine ratio       Other    Abnormal blood sugar     Continue with lifestyle modifications  Low-calorie diet  Check A1c         Relevant Orders    Comprehensive metabolic panel    Hemoglobin A1C    Dyslipidemia     Triglycerides have improved significantly  Continue Crestor 5 milligram          Relevant Medications    rosuvastatin (CRESTOR) 5 mg tablet    Other Relevant Orders    Lipid panel    Prostate cancer screening    Relevant Orders    PSA, Total Screen    Anxiety     Due to COVID pandemic  Patient requesting Xanax  PDMP checked  Patient offered therapy but he declines  Will follow if need for Xanax increases  Patient made aware of the common side effects of the medication  Relevant Medications    ALPRAZolam (XANAX) 0 25 mg tablet        PDMP checked  Reason for visit is   Chief Complaint   Patient presents with    Virtual Regular Visit        Encounter provider Maco Melchor MD    Provider located at [address] [place] [id number]      Recent Visits  No visits were found meeting these conditions     Showing recent visits within past 7 days and meeting all other requirements Today's Visits  Date Type Provider Dept   07/23/20 Telemedicine Jayson Cockayne, MD [facility]   Showing today's visits and meeting all other requirements     Future Appointments  Date Type Provider Dept   07/23/20 Telemedicine Jayson Cockayne, MD [facility]   Showing future appointments within next 150 days and meeting all other requirements        The patient was identified by name and date of birth  Jonel Medel was informed that this is a telemedicine visit and that the visit is being conducted through [facility] and patient was informed that this is a secure, HIPAA-compliant platform  He agrees to proceed     My office door was closed  No one else was in the room  He acknowledged consent and understanding of privacy and security of the video platform  The patient has agreed to participate and understands they can discontinue the visit at any time  Patient is aware this is a billable service  Subjective  Jonel Medel is a 44 y o  male    Hypertension   This is a chronic problem  The current episode started more than 1 year ago  The problem is controlled  Associated symptoms include anxiety  Pertinent negatives include no chest pain, headaches, palpitations or shortness of breath  Risk factors for coronary artery disease include dyslipidemia, male gender and obesity  Treatments tried: Hydrochlorothiazide 12 5, metoprolol XL 50 milligram  The current treatment provides significant improvement  There are no compliance problems  Identifiable causes of hypertension include a thyroid problem  Hyperlipidemia   This is a chronic problem  The current episode started more than 1 year ago  The problem is controlled  Lipid results: Triglycerides improved,   Pertinent negatives include no chest pain, myalgias or shortness of breath  Current antihyperlipidemic treatment includes statins  The current treatment provides moderate improvement of lipids  There are no compliance problems    Risk factors for coronary artery disease include dyslipidemia, hypertension and male sex  Anxiety   Presents for initial visit  Onset was 1 to 4 weeks ago  The problem has been unchanged  Symptoms include excessive worry and nervous/anxious behavior (Due to COVID pandemic)  Patient reports no chest pain, palpitations or shortness of breath  The severity of symptoms is causing significant distress  Exacerbated by: COVID pandemic  There are no known risk factors  Past treatments include lifestyle changes  Thyroid Problem   Presents for follow-up visit  Symptoms include anxiety (Due to COVID pandemic)  Patient reports no fatigue, palpitations or tremors  The symptoms have been stable (Levothyroxine 100 micrograms  )  His past medical history is significant for hyperlipidemia  Past Medical History:   Diagnosis Date    Disease of thyroid gland     Hypertension        History reviewed  No pertinent surgical history  Current Outpatient Medications   Medication Sig Dispense Refill    ALPRAZolam (XANAX) 0 25 mg tablet Take 1 tablet (0 25 mg total) by mouth daily at bedtime as needed for anxiety 30 tablet 0    hydrochlorothiazide (MICROZIDE) 12 5 mg capsule Take 1 capsule (12 5 mg total) by mouth every morning 90 capsule 1    levothyroxine 100 mcg tablet Take 1 tablet (100 mcg total) by mouth daily 90 tablet 1    metoprolol succinate (TOPROL-XL) 50 mg 24 hr tablet Take 1 tablet (50 mg total) by mouth daily 90 tablet 1    Na Sulfate-K Sulfate-Mg Sulf 17 5-3 13-1 6 GM/177ML SOLN Take 1 kit by mouth once for 1 dose 2 Bottle 0    rosuvastatin (CRESTOR) 5 mg tablet Take 1 tablet (5 mg total) by mouth daily 90 tablet 1     No current facility-administered medications for this visit  No Known Allergies    Review of Systems   Constitutional: Negative  Negative for fatigue  Respiratory: Negative  Negative for shortness of breath  Cardiovascular: Negative  Negative for chest pain and palpitations  Gastrointestinal: Negative  Endocrine: Negative  Genitourinary: Negative  Musculoskeletal: Negative  Negative for myalgias  Allergic/Immunologic: Negative  Neurological: Negative  Negative for tremors and headaches  Psychiatric/Behavioral: The patient is nervous/anxious (Due to COVID pandemic)  Video Exam    Vitals:    07/23/20 1803   BP: 113/77   Pulse: 72   Weight: (!) 141 kg (310 lb)   Height: 5' 11" (1 803 m)       Physical Exam   Constitutional: He is oriented to person, place, and time  He appears well-developed and well-nourished  No distress  Pulmonary/Chest: Effort normal  No respiratory distress  Neurological: He is alert and oriented to person, place, and time  Psychiatric: He has a normal mood and affect  His behavior is normal  Judgment and thought content normal       Recent Results (from the past 336 hour(s))   Comprehensive metabolic panel    Collection Time: 07/17/20  7:21 AM   Result Value Ref Range    Glucose, Random 101 (H) 65 - 99 mg/dL    BUN 19 6 - 24 mg/dL    Creatinine 1 06 0 76 - 1 27 mg/dL    eGFR Non  81 >59 mL/min/1 73    eGFR  93 >59 mL/min/1 73    SL AMB BUN/CREATININE RATIO 18 9 - 20    Sodium 140 134 - 144 mmol/L    Potassium 4 1 3 5 - 5 2 mmol/L    Chloride 101 96 - 106 mmol/L    CO2 24 20 - 29 mmol/L    CALCIUM 8 7 8 7 - 10 2 mg/dL    Protein, Total 6 9 6 0 - 8 5 g/dL    Albumin 3 9 3 8 - 4 9 g/dL    Globulin, Total 3 0 1 5 - 4 5 g/dL    Albumin/Globulin Ratio 1 3 1 2 - 2 2    TOTAL BILIRUBIN 0 3 0 0 - 1 2 mg/dL    Alk Phos Isoenzymes 76 39 - 117 IU/L    AST 23 0 - 40 IU/L    ALT 30 0 - 44 IU/L   Lipid panel    Collection Time: 07/17/20  7:21 AM   Result Value Ref Range    Cholesterol, Total 190 100 - 199 mg/dL    Triglycerides 102 0 - 149 mg/dL    HDL 35 (L) >39 mg/dL    VLDL Cholesterol Calculated 20 5 - 40 mg/dL    LDL Calculated 135 (H) 0 - 99 mg/dL    T   Chol/HDL Ratio 5 4 (H) 0 0 - 5 0 ratio   ]  I spent 25 minutes with patient today in which greater than 50% of the time was spent in counseling/coordination of care regarding Management of anxiety  Medications side effects  It abuse potential of Xanax  VIRTUAL VISIT DISCLAIMER    Mabel Braxton acknowledges that he has consented to an online visit or consultation  He understands that the online visit is based solely on information provided by him, and that, in the absence of a face-to-face physical evaluation by the physician, the diagnosis he receives is both limited and provisional in terms of accuracy and completeness  This is not intended to replace a full medical face-to-face evaluation by the physician  Mabel Braxton understands and accepts these terms

## 2020-07-24 ENCOUNTER — ANESTHESIA EVENT (OUTPATIENT)
Dept: GASTROENTEROLOGY | Facility: HOSPITAL | Age: 50
End: 2020-07-24

## 2020-07-24 LAB — SARS-COV-2 RNA SPEC QL NAA+PROBE: NOT DETECTED

## 2020-07-25 ENCOUNTER — HOSPITAL ENCOUNTER (OUTPATIENT)
Dept: GASTROENTEROLOGY | Facility: HOSPITAL | Age: 50
Setting detail: OUTPATIENT SURGERY
Discharge: HOME | End: 2020-07-25
Attending: INTERNAL MEDICINE | Admitting: INTERNAL MEDICINE
Payer: COMMERCIAL

## 2020-07-25 ENCOUNTER — ANESTHESIA (OUTPATIENT)
Dept: GASTROENTEROLOGY | Facility: HOSPITAL | Age: 50
End: 2020-07-25

## 2020-07-25 VITALS
BODY MASS INDEX: 42.56 KG/M2 | HEIGHT: 71 IN | RESPIRATION RATE: 17 BRPM | TEMPERATURE: 97.4 F | WEIGHT: 304 LBS | OXYGEN SATURATION: 96 % | HEART RATE: 70 BPM | DIASTOLIC BLOOD PRESSURE: 72 MMHG | SYSTOLIC BLOOD PRESSURE: 118 MMHG

## 2020-07-25 DIAGNOSIS — Z12.11 SCREENING FOR COLON CANCER: ICD-10-CM

## 2020-07-25 PROCEDURE — 88305 TISSUE EXAM BY PATHOLOGIST: CPT | Performed by: PATHOLOGY

## 2020-07-25 PROCEDURE — 45380 COLONOSCOPY AND BIOPSY: CPT | Performed by: INTERNAL MEDICINE

## 2020-07-25 PROCEDURE — 45385 COLONOSCOPY W/LESION REMOVAL: CPT | Performed by: INTERNAL MEDICINE

## 2020-07-25 RX ORDER — SODIUM CHLORIDE, SODIUM LACTATE, POTASSIUM CHLORIDE, CALCIUM CHLORIDE 600; 310; 30; 20 MG/100ML; MG/100ML; MG/100ML; MG/100ML
125 INJECTION, SOLUTION INTRAVENOUS CONTINUOUS
Status: DISCONTINUED | OUTPATIENT
Start: 2020-07-25 | End: 2020-07-29 | Stop reason: HOSPADM

## 2020-07-25 RX ORDER — PROPOFOL 10 MG/ML
INJECTION, EMULSION INTRAVENOUS AS NEEDED
Status: DISCONTINUED | OUTPATIENT
Start: 2020-07-25 | End: 2020-07-25 | Stop reason: SURG

## 2020-07-25 RX ORDER — LIDOCAINE HYDROCHLORIDE 10 MG/ML
INJECTION, SOLUTION EPIDURAL; INFILTRATION; INTRACAUDAL; PERINEURAL AS NEEDED
Status: DISCONTINUED | OUTPATIENT
Start: 2020-07-25 | End: 2020-07-25 | Stop reason: SURG

## 2020-07-25 RX ORDER — ONDANSETRON 2 MG/ML
4 INJECTION INTRAMUSCULAR; INTRAVENOUS ONCE AS NEEDED
Status: DISCONTINUED | OUTPATIENT
Start: 2020-07-25 | End: 2020-07-29 | Stop reason: HOSPADM

## 2020-07-25 RX ADMIN — PROPOFOL 40 MG: 10 INJECTION, EMULSION INTRAVENOUS at 09:09

## 2020-07-25 RX ADMIN — PROPOFOL 40 MG: 10 INJECTION, EMULSION INTRAVENOUS at 09:14

## 2020-07-25 RX ADMIN — SODIUM CHLORIDE, SODIUM LACTATE, POTASSIUM CHLORIDE, AND CALCIUM CHLORIDE: .6; .31; .03; .02 INJECTION, SOLUTION INTRAVENOUS at 08:54

## 2020-07-25 RX ADMIN — PROPOFOL 40 MG: 10 INJECTION, EMULSION INTRAVENOUS at 09:16

## 2020-07-25 RX ADMIN — PROPOFOL 20 MG: 10 INJECTION, EMULSION INTRAVENOUS at 09:18

## 2020-07-25 RX ADMIN — PROPOFOL 20 MG: 10 INJECTION, EMULSION INTRAVENOUS at 09:08

## 2020-07-25 RX ADMIN — PROPOFOL 100 MG: 10 INJECTION, EMULSION INTRAVENOUS at 09:07

## 2020-07-25 RX ADMIN — LIDOCAINE HYDROCHLORIDE 50 MG: 10 INJECTION, SOLUTION EPIDURAL; INFILTRATION; INTRACAUDAL; PERINEURAL at 09:07

## 2020-07-25 RX ADMIN — PROPOFOL 40 MG: 10 INJECTION, EMULSION INTRAVENOUS at 09:10

## 2020-07-25 RX ADMIN — PROPOFOL 40 MG: 10 INJECTION, EMULSION INTRAVENOUS at 09:11

## 2020-07-25 NOTE — DISCHARGE INSTRUCTIONS
Colorectal Polyps   WHAT YOU NEED TO KNOW:   Colorectal polyps are small growths of tissue in the lining of the colon and rectum  Most polyps are hyperplastic polyps and are usually benign (noncancerous)  Certain types of polyps, called adenomatous polyps, may turn into cancer  DISCHARGE INSTRUCTIONS:   Follow up with your healthcare provider or gastroenterologist as directed: You may need to return for more tests, such as another colonoscopy  Write down your questions so you remember to ask them during your visits  Reduce your risk for colorectal polyps:   · Eat a variety of healthy foods:  Healthy foods include fruit, vegetables, whole-grain breads, low-fat dairy products, beans, lean meat, and fish  Ask if you need to be on a special diet  · Maintain a healthy weight:  Ask your healthcare provider if you need to lose weight and how much you need to lose  Ask for help with a weight loss program     · Exercise:  Begin to exercise slowly and do more as you get stronger  Talk with your healthcare provider before you start an exercise program      · Limit alcohol:  Your risk for polyps increases the more you drink  · Do not smoke: If you smoke, it is never too late to quit  Ask for information about how to stop  For support and more information:   · Dwayne Tran ([facility])  [address], [place] [address]  Phone: [phone]  Web Address: www digestive  niddk nih gov  Contact your healthcare provider or gastroenterologist if:   · You have a fever  · You have chills, a cough, or feel weak and achy  · You have abdominal pain that does not go away or gets worse after you take medicine  · Your abdomen is swollen  · You are losing weight without trying  · You have questions or concerns about your condition or care  Seek care immediately or call 911 if:   · You have sudden shortness of breath       · You have a fast heart rate, fast breathing, or are too dizzy to stand up  · You have severe abdominal pain  · You see blood in your bowel movement  © 2017 [employer] [facility] Information is for End User's use only and may not be sold, redistributed or otherwise used for commercial purposes  All illustrations and images included in CareNotes® are the copyrighted property of A D A M , Inc  or [employer]  The above information is an  only  It is not intended as medical advice for individual conditions or treatments  Talk to your doctor, nurse or pharmacist before following any medical regimen to see if it is safe and effective for you

## 2020-07-25 NOTE — ANESTHESIA PREPROCEDURE EVALUATION
Review of Systems/Medical History  Patient summary reviewed  Chart reviewed  No history of anesthetic complications     Cardiovascular  Exercise tolerance (METS): >4,  Hyperlipidemia, Hypertension , No dysrhythmias , No angina ,    Pulmonary  Negative pulmonary ROS No shortness of breath,        GI/Hepatic    Bowel prep       Negative  ROS        Endo/Other  History of thyroid disease , hypothyroidism,   Obesity  super morbid obesity   GYN       Hematology  Negative hematology ROS      Musculoskeletal  Negative musculoskeletal ROS        Neurology  Negative neurology ROS      Psychology   Anxiety,              Physical Exam    Airway    Mallampati score: I  TM Distance: >3 FB  Neck ROM: full     Dental   No notable dental hx     Cardiovascular  Rhythm: regular, Rate: normal,     Pulmonary  Breath sounds clear to auscultation,     Other Findings        Anesthesia Plan  ASA Score- 3     Anesthesia Type- IV sedation with anesthesia with ASA Monitors  Additional Monitors:   Airway Plan:         Plan Factors-  Patient did not smoke on day of surgery  Induction- intravenous  Postoperative Plan-     Informed Consent- Anesthetic plan and risks discussed with patient  I personally reviewed this patient with the CRNA  Discussed and agreed on the Anesthesia Plan with the CRNA  Lisseth Hassan

## 2020-07-25 NOTE — H&P
History and Physical -  [facility]  Jake Talavera 44 y o  male MRN: [id number]        HPI:  78-year-old male with history of hypertension was referred for colonoscopy  Regular bowel movements and denies any blood or mucus in the stool  Good appetite, no recent weight loss  Historical Information   Past Medical History:   Diagnosis Date    Disease of thyroid gland     Hypertension      Past Surgical History:   Procedure Laterality Date    NO PAST SURGERIES       Social History   Social History     Substance and Sexual Activity   Alcohol Use Yes    Frequency: 2-4 times a month    Comment: SOCIAL      Social History     Substance and Sexual Activity   Drug Use No     Social History     Tobacco Use   Smoking Status Never Smoker   Smokeless Tobacco Never Used   Tobacco Comment    AT RISK FROM PASSIVE SMOKING      Family History   Problem Relation Age of Onset    Emphysema Mother     Bone cancer Mother    Wash Caller Breast cancer Mother     Lung cancer Mother    Wash Caller Dementia Father     Stroke Father     Hyperlipidemia Father     Hypertension Father     Diabetes type II Father     Thyroid cancer Sister        Meds/Allergies       (Not in a hospital admission)    No Known Allergies    Objective     Blood pressure 139/84, pulse 75, temperature 97 7 °F (36 5 °C), temperature source Temporal, resp  rate 17, height 5' 11" (1 803 m), weight (!) 138 kg (304 lb), SpO2 96 %      PHYSICAL EXAM:    Gen: NAD  CV: S1 & S2 normal, RRR  CHEST: Clear to auscultate  ABD: soft, NT/ND, good bowel sounds  EXT: no edema    ASSESSMENT:     Screening for colon cancer    PLAN:    Colonoscopy

## 2020-07-25 NOTE — ANESTHESIA POSTPROCEDURE EVALUATION
Post-Op Assessment Note    CV Status:  Stable  Pain Score: 0    Pain management: adequate     Mental Status:  Awake and alert   Hydration Status:  Stable   PONV Controlled:  Controlled   Airway Patency:  Patent   Post Op Vitals Reviewed: Yes      Staff: Anesthesiologist           /81 (07/25/20 0925)    Temp (!) 97 4 °F (36 3 °C) (07/25/20 0925)    Pulse 76 (07/25/20 0925)   Resp 17 (07/25/20 0925)    SpO2 96 % (07/25/20 0925)

## 2020-07-29 ENCOUNTER — TELEPHONE (OUTPATIENT)
Dept: GASTROENTEROLOGY | Facility: MEDICAL CENTER | Age: 50
End: 2020-07-29

## 2020-07-29 NOTE — TELEPHONE ENCOUNTER
----- Message from Eleanor Chahal MD sent at 7/28/2020  4:05 PM EDT -----    Colon polyps removed came back as precancerous tubular adenoma  Next colonoscopy in 3 years  Patient to call our office for any GI symptoms

## 2021-03-27 ENCOUNTER — IMMUNIZATIONS (OUTPATIENT)
Dept: FAMILY MEDICINE CLINIC | Facility: HOSPITAL | Age: 51
End: 2021-03-27

## 2021-03-27 DIAGNOSIS — Z23 ENCOUNTER FOR IMMUNIZATION: Primary | ICD-10-CM

## 2021-03-27 PROCEDURE — 0001A SARS-COV-2 / COVID-19 MRNA VACCINE (PFIZER-BIONTECH) 30 MCG: CPT

## 2021-03-27 PROCEDURE — 91300 SARS-COV-2 / COVID-19 MRNA VACCINE (PFIZER-BIONTECH) 30 MCG: CPT

## 2021-04-05 ENCOUNTER — TELEMEDICINE (OUTPATIENT)
Dept: FAMILY MEDICINE CLINIC | Facility: CLINIC | Age: 51
End: 2021-04-05
Payer: COMMERCIAL

## 2021-04-05 VITALS
TEMPERATURE: 97.2 F | WEIGHT: 304 LBS | HEART RATE: 87 BPM | HEIGHT: 71 IN | BODY MASS INDEX: 42.56 KG/M2 | DIASTOLIC BLOOD PRESSURE: 82 MMHG | SYSTOLIC BLOOD PRESSURE: 140 MMHG

## 2021-04-05 DIAGNOSIS — B34.9 VIRAL INFECTION, UNSPECIFIED: ICD-10-CM

## 2021-04-05 DIAGNOSIS — B34.9 VIRAL INFECTION, UNSPECIFIED: Primary | ICD-10-CM

## 2021-04-05 PROCEDURE — U0005 INFEC AGEN DETEC AMPLI PROBE: HCPCS | Performed by: NURSE PRACTITIONER

## 2021-04-05 PROCEDURE — 3725F SCREEN DEPRESSION PERFORMED: CPT | Performed by: NURSE PRACTITIONER

## 2021-04-05 PROCEDURE — U0003 INFECTIOUS AGENT DETECTION BY NUCLEIC ACID (DNA OR RNA); SEVERE ACUTE RESPIRATORY SYNDROME CORONAVIRUS 2 (SARS-COV-2) (CORONAVIRUS DISEASE [COVID-19]), AMPLIFIED PROBE TECHNIQUE, MAKING USE OF HIGH THROUGHPUT TECHNOLOGIES AS DESCRIBED BY CMS-2020-01-R: HCPCS | Performed by: NURSE PRACTITIONER

## 2021-04-05 PROCEDURE — 99212 OFFICE O/P EST SF 10 MIN: CPT | Performed by: NURSE PRACTITIONER

## 2021-04-05 PROCEDURE — 1036F TOBACCO NON-USER: CPT | Performed by: NURSE PRACTITIONER

## 2021-04-05 PROCEDURE — 3008F BODY MASS INDEX DOCD: CPT | Performed by: NURSE PRACTITIONER

## 2021-04-05 NOTE — PROGRESS NOTES
COVID-19 Outpatient Progress Note    Assessment/Plan:    Problem List Items Addressed This Visit        Other    Viral infection, unspecified - Primary    Relevant Orders    Novel Coronavirus (Covid-19),PCR [facility] - Collected at [facility] or [facility]         Disposition:     I referred patient to one of our centralized sites for a COVID-19 swab  Patient started with symptoms on 4/2/21  COVID 19 testing ordered  Will follow-up results with the patient  Quarantine instructions reviewed  Patient instructed to follow-up if symptoms get worse or do not get better  Patient instructed to go to the ED if he becomes SOB  I have spent 12 minutes directly with the patient  Greater than 50% of this time was spent in counseling/coordination of care regarding: COVID 19 virus and quarantine instructions           Encounter provider PERRY Fagan    Provider located at [address] [place] [phone]    Recent Visits  No visits were found meeting these conditions  Showing recent visits within past 7 days and meeting all other requirements     Today's Visits  Date Type Provider Dept   04/05/21 Telemedicine PERRY Fagan [facility]   Showing today's visits and meeting all other requirements     Future Appointments  No visits were found meeting these conditions  Showing future appointments within next 150 days and meeting all other requirements      This virtual check-in was done via [employer] and patient was informed that this is a secure, HIPAA-compliant platform  He agrees to proceed  Patient agrees to participate in a virtual check in via telephone or video visit instead of presenting to the office to address urgent/immediate medical needs  Patient is aware this is a billable service  After connecting through [facility], the patient was identified by name and date of birth   Aleida Martinez was informed that this was a telemedicine visit and that the exam was being conducted confidentially over secure lines  My office door was closed  No one else was in the room  Ibeth Alvarez acknowledged consent and understanding of privacy and security of the telemedicine visit  I informed the patient that I have reviewed his record in Epic and presented the opportunity for him to ask any questions regarding the visit today  The patient agreed to participate  Subjective:   Ibeth Alvarez is a 44 y o  male who is concerned about COVID-19  Patient's symptoms include fatigue, nasal congestion, rhinorrhea and cough  Patient denies fever, chills, malaise, sore throat, anosmia, loss of taste, shortness of breath, chest tightness, abdominal pain, nausea, vomiting, diarrhea, myalgias and headaches       Date of symptom onset: 4/2/2021    Exposure:   Contact with a person who is under investigation (PUI) for or who is positive for COVID-19 within the last 14 days?: No    Hospitalized recently for fever and/or lower respiratory symptoms?: No      Currently a healthcare worker that is involved in direct patient care?: No      Works in a special setting where the risk of COVID-19 transmission may be high? (this may include long-term care, correctional and [facility] facilities; homeless shelters; assisted-living facilities and group homes ): No      Resident in a special setting where the risk of COVID-19 transmission may be high? (this may include long-term care, correctional and [facility] facilities; homeless shelters; assisted-living facilities and group homes ): No      Lab Results   Component Value Date    [address] [facility] 98 Not Detected 07/20/2020     Past Medical History:   Diagnosis Date    Disease of thyroid gland     Hypertension      Past Surgical History:   Procedure Laterality Date    NO PAST SURGERIES       Current Outpatient Medications   Medication Sig Dispense Refill    hydrochlorothiazide (MICROZIDE) 12 5 mg capsule Take 1 capsule (12 5 mg total) by mouth every morning 90 capsule 1    levothyroxine 100 mcg tablet Take 1 tablet (100 mcg total) by mouth daily 90 tablet 1    metoprolol succinate (TOPROL-XL) 50 mg 24 hr tablet Take 1 tablet (50 mg total) by mouth daily 90 tablet 1    rosuvastatin (CRESTOR) 5 mg tablet Take 1 tablet (5 mg total) by mouth daily 90 tablet 1    ALPRAZolam (XANAX) 0 25 mg tablet Take 1 tablet (0 25 mg total) by mouth daily at bedtime as needed for anxiety (Patient not taking: Reported on 4/5/2021) 30 tablet 0     No current facility-administered medications for this visit  No Known Allergies    Review of Systems   Constitutional: Positive for fatigue  Negative for chills and fever  HENT: Positive for congestion and rhinorrhea  Negative for ear pain and sore throat  Respiratory: Positive for cough  Negative for chest tightness, shortness of breath and wheezing  Cardiovascular: Negative for chest pain  Gastrointestinal: Negative for abdominal pain, diarrhea, nausea and vomiting  Musculoskeletal: Negative for myalgias  Skin: Negative for rash  Neurological: Negative for dizziness, syncope, light-headedness and headaches  Objective:    Vitals:    04/05/21 1303   BP: 140/82   Pulse: 87   Temp: (!) 97 2 °F (36 2 °C)   Weight: (!) 138 kg (304 lb)   Height: 5' 11" (1 803 m)       Physical Exam   Video component of harish was not working  VIRTUAL VISIT DISCLAIMER    Douglas Stuartalberto acknowledges that he has consented to an online visit or consultation  He understands that the online visit is based solely on information provided by him, and that, in the absence of a face-to-face physical evaluation by the physician, the diagnosis he receives is both limited and provisional in terms of accuracy and completeness  This is not intended to replace a full medical face-to-face evaluation by the physician  Douglas Nelson understands and accepts these terms

## 2021-04-06 ENCOUNTER — TELEPHONE (OUTPATIENT)
Dept: FAMILY MEDICINE CLINIC | Facility: CLINIC | Age: 51
End: 2021-04-06

## 2021-04-06 DIAGNOSIS — J01.90 ACUTE SINUSITIS, RECURRENCE NOT SPECIFIED, UNSPECIFIED LOCATION: Primary | ICD-10-CM

## 2021-04-06 LAB — SARS-COV-2 RNA RESP QL NAA+PROBE: NEGATIVE

## 2021-04-06 RX ORDER — AMOXICILLIN AND CLAVULANATE POTASSIUM 875; 125 MG/1; MG/1
1 TABLET, FILM COATED ORAL EVERY 12 HOURS SCHEDULED
Qty: 14 TABLET | Refills: 0 | Status: SHIPPED | OUTPATIENT
Start: 2021-04-06 | End: 2021-04-13

## 2021-04-06 NOTE — TELEPHONE ENCOUNTER
Patient called and stated his COVID test came back negative and he wants to know if the doctor can call him in something for his sinus infections, which he knows he has

## 2021-04-06 NOTE — TELEPHONE ENCOUNTER
----- Message from [id number] [facility] sent at 4/6/2021  1:37 PM EDT -----  Please let the patient know that his COVID 19 test was negative

## 2021-04-17 ENCOUNTER — IMMUNIZATIONS (OUTPATIENT)
Dept: FAMILY MEDICINE CLINIC | Facility: HOSPITAL | Age: 51
End: 2021-04-17

## 2021-04-17 DIAGNOSIS — Z23 ENCOUNTER FOR IMMUNIZATION: Primary | ICD-10-CM

## 2021-04-17 PROCEDURE — 91300 SARS-COV-2 / COVID-19 MRNA VACCINE (PFIZER-BIONTECH) 30 MCG: CPT

## 2021-04-17 PROCEDURE — 0002A SARS-COV-2 / COVID-19 MRNA VACCINE (PFIZER-BIONTECH) 30 MCG: CPT

## 2021-04-23 DIAGNOSIS — E03.9 HYPOTHYROIDISM, UNSPECIFIED TYPE: ICD-10-CM

## 2021-04-23 RX ORDER — LEVOTHYROXINE SODIUM 0.1 MG/1
TABLET ORAL
Qty: 90 TABLET | Refills: 0 | Status: SHIPPED | OUTPATIENT
Start: 2021-04-23 | End: 2021-07-26

## 2021-05-01 LAB
ALBUMIN SERPL-MCNC: 4.1 G/DL (ref 3.8–4.9)
ALBUMIN/CREAT UR: <4 MG/G CREAT (ref 0–29)
ALBUMIN/GLOB SERPL: 1.4 {RATIO} (ref 1.2–2.2)
ALP SERPL-CCNC: 86 IU/L (ref 39–117)
ALT SERPL-CCNC: 38 IU/L (ref 0–44)
AST SERPL-CCNC: 29 IU/L (ref 0–40)
BILIRUB SERPL-MCNC: 0.4 MG/DL (ref 0–1.2)
BUN SERPL-MCNC: 16 MG/DL (ref 6–24)
BUN/CREAT SERPL: 18 (ref 9–20)
CALCIUM SERPL-MCNC: 9 MG/DL (ref 8.7–10.2)
CHLORIDE SERPL-SCNC: 99 MMOL/L (ref 96–106)
CHOLEST SERPL-MCNC: 195 MG/DL (ref 100–199)
CHOLEST/HDLC SERPL: 5.1 RATIO (ref 0–5)
CO2 SERPL-SCNC: 28 MMOL/L (ref 20–29)
CREAT SERPL-MCNC: 0.88 MG/DL (ref 0.76–1.27)
CREAT UR-MCNC: 79.3 MG/DL
EST. AVERAGE GLUCOSE BLD GHB EST-MCNC: 105 MG/DL
GLOBULIN SER-MCNC: 3 G/DL (ref 1.5–4.5)
GLUCOSE SERPL-MCNC: 97 MG/DL (ref 65–99)
HBA1C MFR BLD: 5.3 % (ref 4.8–5.6)
HDLC SERPL-MCNC: 38 MG/DL
LDLC SERPL CALC-MCNC: 138 MG/DL (ref 0–99)
MICROALBUMIN UR-MCNC: <3 UG/ML
POTASSIUM SERPL-SCNC: 4 MMOL/L (ref 3.5–5.2)
PROT SERPL-MCNC: 7.1 G/DL (ref 6–8.5)
SL AMB EGFR AFRICAN AMERICAN: 114 ML/MIN/1.73
SL AMB EGFR NON AFRICAN AMERICAN: 99 ML/MIN/1.73
SL AMB T4, FREE (DIRECT): 1.28 NG/DL (ref 0.82–1.77)
SL AMB VLDL CHOLESTEROL CALC: 19 MG/DL (ref 5–40)
SODIUM SERPL-SCNC: 138 MMOL/L (ref 134–144)
TRIGL SERPL-MCNC: 104 MG/DL (ref 0–149)
TSH SERPL DL<=0.005 MIU/L-ACNC: 6.12 UIU/ML (ref 0.45–4.5)

## 2021-05-03 ENCOUNTER — TELEPHONE (OUTPATIENT)
Dept: FAMILY MEDICINE CLINIC | Facility: CLINIC | Age: 51
End: 2021-05-03

## 2021-05-03 NOTE — TELEPHONE ENCOUNTER
----- Message from Adrianna Ulloa MD sent at 5/3/2021  8:08 AM EDT -----  Please advise fup to discuss results

## 2021-05-05 DIAGNOSIS — I10 BENIGN ESSENTIAL HYPERTENSION: ICD-10-CM

## 2021-05-05 RX ORDER — METOPROLOL SUCCINATE 50 MG/1
TABLET, EXTENDED RELEASE ORAL
Qty: 90 TABLET | Refills: 0 | Status: SHIPPED | OUTPATIENT
Start: 2021-05-05 | End: 2021-08-05 | Stop reason: SDUPTHER

## 2021-05-05 RX ORDER — HYDROCHLOROTHIAZIDE 12.5 MG/1
CAPSULE, GELATIN COATED ORAL
Qty: 90 CAPSULE | Refills: 0 | Status: SHIPPED | OUTPATIENT
Start: 2021-05-05 | End: 2021-08-05 | Stop reason: SDUPTHER

## 2021-05-07 ENCOUNTER — RA CDI HCC (OUTPATIENT)
Dept: OTHER | Facility: HOSPITAL | Age: 51
End: 2021-05-07

## 2021-05-07 NOTE — PROGRESS NOTES
[place] [id number]  coding opportunities             Chart reviewed, (number of) suggestions sent to provider: 1     Problem listed updated  Provider Accepted, (number of) suggestions accepted: 1     Provider Rejected Suggestions for: suggestion used     Patients insurance company: Alaina Quiroz (Medicare and Commercial for [employer] and [employer])     Visit status: Patient arrived for their scheduled appointment        [place] [address]  coding opportunities             Chart reviewed, (number of) suggestions sent to provider: 1     Problem listed updated   Provider Accepted, (number of) suggestions accepted: 1        Patients insurance company: Alaina Gabriella (Medicare and Commercial for [employer] and [employer])           [place] [address]  coding opportunities             Chart reviewed, (number of) suggestions sent to provider: 1   E66 01  Morbid (severe) obesity due to excess calories        Patients insurance company: [employer] (Medicare and Commercial for [employer] and [employer])

## 2021-05-13 ENCOUNTER — OFFICE VISIT (OUTPATIENT)
Dept: FAMILY MEDICINE CLINIC | Facility: CLINIC | Age: 51
End: 2021-05-13
Payer: COMMERCIAL

## 2021-05-13 VITALS
DIASTOLIC BLOOD PRESSURE: 86 MMHG | TEMPERATURE: 97.4 F | SYSTOLIC BLOOD PRESSURE: 130 MMHG | BODY MASS INDEX: 43.68 KG/M2 | OXYGEN SATURATION: 96 % | WEIGHT: 312 LBS | RESPIRATION RATE: 16 BRPM | HEIGHT: 71 IN | HEART RATE: 88 BPM

## 2021-05-13 DIAGNOSIS — F41.9 ANXIETY: ICD-10-CM

## 2021-05-13 DIAGNOSIS — R73.09 ABNORMAL BLOOD SUGAR: ICD-10-CM

## 2021-05-13 DIAGNOSIS — G89.29 CHRONIC BILATERAL LOW BACK PAIN WITHOUT SCIATICA: ICD-10-CM

## 2021-05-13 DIAGNOSIS — E78.5 HYPERLIPIDEMIA, UNSPECIFIED HYPERLIPIDEMIA TYPE: ICD-10-CM

## 2021-05-13 DIAGNOSIS — E03.9 HYPOTHYROIDISM, UNSPECIFIED TYPE: Primary | ICD-10-CM

## 2021-05-13 DIAGNOSIS — E66.01 CLASS 3 SEVERE OBESITY DUE TO EXCESS CALORIES WITH SERIOUS COMORBIDITY AND BODY MASS INDEX (BMI) OF 40.0 TO 44.9 IN ADULT (HCC): ICD-10-CM

## 2021-05-13 DIAGNOSIS — M54.50 CHRONIC BILATERAL LOW BACK PAIN WITHOUT SCIATICA: ICD-10-CM

## 2021-05-13 DIAGNOSIS — I10 BENIGN ESSENTIAL HYPERTENSION: ICD-10-CM

## 2021-05-13 DIAGNOSIS — E78.5 DYSLIPIDEMIA: ICD-10-CM

## 2021-05-13 PROBLEM — C61 PROSTATE CANCER (HCC): Status: RESOLVED | Noted: 2018-04-06 | Resolved: 2021-05-13

## 2021-05-13 PROBLEM — B34.9 VIRAL INFECTION, UNSPECIFIED: Status: RESOLVED | Noted: 2021-04-05 | Resolved: 2021-05-13

## 2021-05-13 PROCEDURE — 3008F BODY MASS INDEX DOCD: CPT | Performed by: FAMILY MEDICINE

## 2021-05-13 PROCEDURE — 3079F DIAST BP 80-89 MM HG: CPT | Performed by: FAMILY MEDICINE

## 2021-05-13 PROCEDURE — 99215 OFFICE O/P EST HI 40 MIN: CPT | Performed by: FAMILY MEDICINE

## 2021-05-13 PROCEDURE — 3075F SYST BP GE 130 - 139MM HG: CPT | Performed by: FAMILY MEDICINE

## 2021-05-13 PROCEDURE — 1036F TOBACCO NON-USER: CPT | Performed by: FAMILY MEDICINE

## 2021-05-13 RX ORDER — ROSUVASTATIN CALCIUM 10 MG/1
10 TABLET, COATED ORAL DAILY
Qty: 90 TABLET | Refills: 1 | Status: SHIPPED | OUTPATIENT
Start: 2021-05-13 | End: 2021-11-17 | Stop reason: SDUPTHER

## 2021-05-13 NOTE — ASSESSMENT & PLAN NOTE
Patient's blood pressure is at goal   Continue hydrochlorothiazide 12 5, metoprolol XL 50 milligram

## 2021-05-13 NOTE — PROGRESS NOTES
Subjective:      Patient ID: Noel Aguilera is a 44 y o  male  Thyroid Problem  Presents for follow-up visit  Symptoms include fatigue  Patient reports no heat intolerance, palpitations or tremors  Symptom course: TSH 6 12 on levothyroxine 100 mcg  His past medical history is significant for hyperlipidemia  Hypertension  This is a chronic problem  The current episode started more than 1 year ago  The problem is controlled  Pertinent negatives include no chest pain, headaches, palpitations, peripheral edema or shortness of breath  Treatments tried: Hydrochlorothiazide 12 5, metoprolol XL 50 milligram  The current treatment provides significant improvement  There are no compliance problems  Identifiable causes of hypertension include a thyroid problem  Hyperlipidemia  This is a chronic problem  The current episode started more than 1 year ago  The problem is uncontrolled  Recent lipid tests were reviewed and are high  Pertinent negatives include no chest pain, leg pain, myalgias or shortness of breath  Current antihyperlipidemic treatment includes statins (Crestor 5 milligram)  The current treatment provides mild improvement of lipids  Risk factors for coronary artery disease include hypertension, male sex and dyslipidemia  Back Pain  This is a recurrent problem  The current episode started more than 1 month ago  The problem occurs intermittently  The problem is unchanged  The pain is present in the lumbar spine  The quality of the pain is described as aching  The pain does not radiate  The pain is at a severity of 4/10  The pain is moderate  The pain is the same all the time  The symptoms are aggravated by twisting, position and lying down  Stiffness is present all day  Pertinent negatives include no bladder incontinence, bowel incontinence, chest pain, dysuria, fever, headaches, leg pain, numbness, paresis, paresthesias, pelvic pain, perianal numbness, tingling or weakness   Risk factors include lack of exercise and obesity  He has tried NSAIDs for the symptoms  The treatment provided no relief  Past Medical History:   Diagnosis Date    Disease of thyroid gland     Hypertension        Family History   Problem Relation Age of Onset    Emphysema Mother     Bone cancer Mother    [facility] Breast cancer Mother     Lung cancer Mother     Dementia Father     Stroke Father     Hyperlipidemia Father     Hypertension Father     Diabetes type II Father     Thyroid cancer Sister        Past Surgical History:   Procedure Laterality Date    NO PAST SURGERIES          reports that he has never smoked  He has never used smokeless tobacco  He reports current alcohol use  He reports that he does not use drugs  Current Outpatient Medications:     hydrochlorothiazide (MICROZIDE) 12 5 mg capsule, Take 1 capsule by mouth once daily in the morning, Disp: 90 capsule, Rfl: 0    levothyroxine 100 mcg tablet, Take 1 tablet by mouth once daily, Disp: 90 tablet, Rfl: 0    metoprolol succinate (TOPROL-XL) 50 mg 24 hr tablet, Take 1 tablet by mouth once daily, Disp: 90 tablet, Rfl: 0    ALPRAZolam (XANAX) 0 25 mg tablet, Take 1 tablet (0 25 mg total) by mouth daily at bedtime as needed for anxiety (Patient not taking: Reported on 4/5/2021), Disp: 30 tablet, Rfl: 0    rosuvastatin (CRESTOR) 10 MG tablet, Take 1 tablet (10 mg total) by mouth daily, Disp: 90 tablet, Rfl: 1    The following portions of the patient's history were reviewed and updated as appropriate: allergies, current medications, past family history, past medical history, past social history, past surgical history and problem list     Review of Systems   Constitutional: Positive for fatigue  Negative for fever  Respiratory: Negative  Negative for shortness of breath  Cardiovascular: Negative  Negative for chest pain and palpitations  Gastrointestinal: Negative  Negative for bowel incontinence  Endocrine: Negative  Negative for heat intolerance  Genitourinary: Negative  Negative for bladder incontinence, dysuria and pelvic pain  Musculoskeletal: Positive for back pain  Negative for myalgias  Allergic/Immunologic: Negative  Neurological: Negative for tingling, tremors, weakness, numbness, headaches and paresthesias  Psychiatric/Behavioral: Negative  Objective:    /86 (BP Location: Right arm, Patient Position: Sitting, Cuff Size: Large)   Pulse 88   Temp (!) 97 4 °F (36 3 °C) (Tympanic)   Resp 16   Ht 5' 11" (1 803 m)   Wt (!) 142 kg (312 lb)   SpO2 96%   BMI 43 52 kg/m²      Physical Exam  Constitutional:       Appearance: He is well-developed  HENT:      Mouth/Throat:      Pharynx: No oropharyngeal exudate  Eyes:      Pupils: Pupils are equal, round, and reactive to light  Neck:      Musculoskeletal: Normal range of motion  Cardiovascular:      Rate and Rhythm: Normal rate and regular rhythm  Pulmonary:      Effort: Pulmonary effort is normal       Breath sounds: Normal breath sounds  Abdominal:      General: Bowel sounds are normal       Palpations: Abdomen is soft  Musculoskeletal: Normal range of motion  Neurological:      General: No focal deficit present  Mental Status: He is alert and oriented to person, place, and time  Mental status is at baseline  Sensory: No sensory deficit  Motor: No weakness  Gait: Gait normal    Psychiatric:         Behavior: Behavior normal          Judgment: Judgment normal            Recent Results (from the past 1008 hour(s))   Novel Coronavirus (Covid-19),PCR [facility] - Collected at [place] [id number] or [facility]    Collection Time: 04/05/21  2:57 PM    Specimen: Nose; Nares   Result Value Ref Range    SARS-CoV-2 Negative Negative   Microalbumin / creatinine urine ratio    Collection Time: 04/30/21  6:56 AM   Result Value Ref Range    Creatinine, Urine 79 3 Not Estab  mg/dL    Microalbum  ,U,Random <3 0 Not Estab  ug/mL    Microalb/Creat Ratio <4 0 - 29 mg/g creat   Comprehensive metabolic panel    Collection Time: 04/30/21  6:56 AM   Result Value Ref Range    Glucose, Random 97 65 - 99 mg/dL    BUN 16 6 - 24 mg/dL    Creatinine 0 88 0 76 - 1 27 mg/dL    eGFR Non African American 99 >59 mL/min/1 73    eGFR  114 >59 mL/min/1 73    SL AMB BUN/CREATININE RATIO 18 9 - 20    Sodium 138 134 - 144 mmol/L    Potassium 4 0 3 5 - 5 2 mmol/L    Chloride 99 96 - 106 mmol/L    CO2 28 20 - 29 mmol/L    CALCIUM 9 0 8 7 - 10 2 mg/dL    Protein, Total 7 1 6 0 - 8 5 g/dL    Albumin 4 1 3 8 - 4 9 g/dL    Globulin, Total 3 0 1 5 - 4 5 g/dL    Albumin/Globulin Ratio 1 4 1 2 - 2 2    TOTAL BILIRUBIN 0 4 0 0 - 1 2 mg/dL    Alk Phos Isoenzymes 86 39 - 117 IU/L    AST 29 0 - 40 IU/L    ALT 38 0 - 44 IU/L   Lipid panel    Collection Time: 04/30/21  6:56 AM   Result Value Ref Range    Cholesterol, Total 195 100 - 199 mg/dL    Triglycerides 104 0 - 149 mg/dL    HDL 38 (L) >39 mg/dL    VLDL Cholesterol Calculated 19 5 - 40 mg/dL    LDL Calculated 138 (H) 0 - 99 mg/dL    T  Chol/HDL Ratio 5 1 (H) 0 0 - 5 0 ratio   TSH, 3rd generation with Free T4 reflex    Collection Time: 04/30/21  6:56 AM   Result Value Ref Range    TSH 6 120 (H) 0 450 - 4 500 uIU/mL   Hemoglobin A1C    Collection Time: 04/30/21  6:56 AM   Result Value Ref Range    Hemoglobin A1C 5 3 4 8 - 5 6 %    Estimated Average Glucose 105 mg/dL   T4, free    Collection Time: 04/30/21  6:56 AM   Result Value Ref Range    T4,Free (Direct) 1 28 0 82 - 1 77 ng/dL       Assessment/Plan:             Problem List Items Addressed This Visit        Endocrine    Hypothyroidism - Primary     TSH is borderline high  Patient on levothyroxine 100 micrograms    Reassess thyroid function, if persistently abnormal then will increase the dose of levothyroxine         Relevant Orders    TSH, 3rd generation with Free T4 reflex    TSH, 3rd generation with Free T4 reflex       Cardiovascular and Mediastinum    Benign essential hypertension Patient's blood pressure is at goal   Continue hydrochlorothiazide 12 5, metoprolol XL 50 milligram             Other    Abnormal blood sugar     Patient advised low carb diet, regular exercise  Continue monitoring fasting blood sugar with metabolic panel  Relevant Orders    Comprehensive metabolic panel    Dyslipidemia     LDL is elevated  Patient advised to increase Crestor to 10 milligram   Suggested metabolic labs/follow-up at 3 months interval          Relevant Orders    Lipid panel    Class 3 severe obesity due to excess calories with serious comorbidity in adult [facility])    Anxiety     Patient currently doing therapy  Would like to continue same  Follow-up if symptoms worsen         RESOLVED: Hyperlipidemia    Relevant Medications    rosuvastatin (CRESTOR) 10 MG tablet      Other Visit Diagnoses     Chronic bilateral low back pain without sciatica        Relevant Orders    XR spine lumbar minimum 4 views non injury          BMI Counseling: Body mass index is 43 52 kg/m²  The BMI is above normal  Nutrition recommendations include reducing portion sizes, decreasing overall calorie intake, 3-5 servings of fruits/vegetables daily, reducing fast food intake, consuming healthier snacks and decreasing soda and/or juice intake  Exercise recommendations include moderate aerobic physical activity for 150 minutes/week  I have spent 40 minutes with Patient  today in which greater than 50% of this time was spent in counseling/coordination of care regarding Diagnostic results, Prognosis, Risks and benefits of tx options, Intructions for management, Patient and family education, Importance of tx compliance, Risk factor reductions and Impressions

## 2021-05-13 NOTE — ASSESSMENT & PLAN NOTE
TSH is borderline high  Patient on levothyroxine 100 micrograms    Reassess thyroid function, if persistently abnormal then will increase the dose of levothyroxine

## 2021-05-13 NOTE — ASSESSMENT & PLAN NOTE
Patient advised low carb diet, regular exercise  Continue monitoring fasting blood sugar with metabolic panel

## 2021-05-13 NOTE — ASSESSMENT & PLAN NOTE
LDL is elevated    Patient advised to increase Crestor to 10 milligram   Suggested metabolic labs/follow-up at 3 months interval

## 2021-05-25 LAB — TSH SERPL DL<=0.005 MIU/L-ACNC: 2.32 UIU/ML (ref 0.45–4.5)

## 2021-05-27 DIAGNOSIS — M54.41 CHRONIC BILATERAL LOW BACK PAIN WITH BILATERAL SCIATICA: Primary | ICD-10-CM

## 2021-05-27 DIAGNOSIS — M54.42 CHRONIC BILATERAL LOW BACK PAIN WITH BILATERAL SCIATICA: Primary | ICD-10-CM

## 2021-05-27 DIAGNOSIS — G89.29 CHRONIC BILATERAL LOW BACK PAIN WITH BILATERAL SCIATICA: Primary | ICD-10-CM

## 2021-05-27 RX ORDER — PREDNISONE 20 MG/1
40 TABLET ORAL DAILY
Qty: 10 TABLET | Refills: 0 | Status: SHIPPED | OUTPATIENT
Start: 2021-05-27 | End: 2021-06-01

## 2021-06-29 ENCOUNTER — CONSULT (OUTPATIENT)
Dept: PAIN MEDICINE | Facility: CLINIC | Age: 51
End: 2021-06-29
Payer: COMMERCIAL

## 2021-06-29 VITALS
BODY MASS INDEX: 43.52 KG/M2 | WEIGHT: 312 LBS | SYSTOLIC BLOOD PRESSURE: 134 MMHG | DIASTOLIC BLOOD PRESSURE: 76 MMHG | HEART RATE: 78 BPM

## 2021-06-29 DIAGNOSIS — M51.16 LUMBAR DISC DISEASE WITH RADICULOPATHY: ICD-10-CM

## 2021-06-29 DIAGNOSIS — M54.41 CHRONIC BILATERAL LOW BACK PAIN WITH BILATERAL SCIATICA: Primary | ICD-10-CM

## 2021-06-29 DIAGNOSIS — G89.4 CHRONIC PAIN SYNDROME: ICD-10-CM

## 2021-06-29 DIAGNOSIS — G89.29 CHRONIC BILATERAL LOW BACK PAIN WITH BILATERAL SCIATICA: Primary | ICD-10-CM

## 2021-06-29 DIAGNOSIS — M54.42 CHRONIC BILATERAL LOW BACK PAIN WITH BILATERAL SCIATICA: Primary | ICD-10-CM

## 2021-06-29 PROCEDURE — 99204 OFFICE O/P NEW MOD 45 MIN: CPT | Performed by: PHYSICAL MEDICINE & REHABILITATION

## 2021-06-29 PROCEDURE — 3078F DIAST BP <80 MM HG: CPT | Performed by: PHYSICAL MEDICINE & REHABILITATION

## 2021-06-29 PROCEDURE — 3075F SYST BP GE 130 - 139MM HG: CPT | Performed by: PHYSICAL MEDICINE & REHABILITATION

## 2021-06-29 RX ORDER — DULOXETIN HYDROCHLORIDE 30 MG/1
30 CAPSULE, DELAYED RELEASE ORAL DAILY
Qty: 30 CAPSULE | Refills: 1 | Status: ON HOLD | OUTPATIENT
Start: 2021-06-29 | End: 2021-11-24 | Stop reason: ALTCHOICE

## 2021-06-29 NOTE — PATIENT INSTRUCTIONS
Lumbar Radiculopathy   WHAT YOU NEED TO KNOW:   Lumbar radiculopathy is a painful condition that happens when a nerve in your lumbar spine (lower back) is pinched or irritated  Nerves control feeling and movement in your body  You may have numbness or pain that shoots down from your lower back towards your foot  DISCHARGE INSTRUCTIONS:   Medicines:   · Medicines:     ? NSAIDs , such as ibuprofen, help decrease swelling, pain, and fever  This medicine is available with or without a doctor's order  NSAIDs can cause stomach bleeding or kidney problems in certain people  If you take blood thinner medicine, always ask your healthcare provider if NSAIDs are safe for you  Always read the medicine label and follow directions  ? Muscle relaxers  help decrease pain and muscle spasms  ? Opioids: This is a strong medicine given to reduce severe pain  It is also called narcotic pain medicine  Take this medicine exactly as directed by your healthcare provider  ? Oral steroids: Steroids may also be given to reduce pain and swelling  ? Take your medicine as directed  Contact your healthcare provider if you think your medicine is not helping or if you have side effects  Tell him of her if you are allergic to any medicine  Keep a list of the medicines, vitamins, and herbs you take  Include the amounts, and when and why you take them  Bring the list or the pill bottles to follow-up visits  Carry your medicine list with you in case of an emergency  Follow up with your healthcare provider or spine specialist within 1 to 3 weeks:  After your first follow-up appointment, return to your healthcare provider or spine specialist every 2 weeks until you have healed  Ask for information about physical therapy for your condition  Write down your questions so you remember to ask them during your visits  Physical therapy:  You may need physical therapy to improve your condition   Your physical therapist may teach you certain exercises to improve posture (the way you stand and sit), flexibility, and strength in your lower back  Self care:   · Stay active: It is best to be active when you have lumbar radiculopathy  Your physical therapist or healthcare provider may tell you to take walks to ease yourself back into your daily routine  Avoid long periods of bed rest  Bed rest could worsen your symptoms  Do not move in ways that increase your pain  Ask for more information about the best ways to stay active  · Use ice or heat packs:  Use ice or heat packs as directed on the sore area of your body to decrease the pain and swelling  Put ice in a plastic bag covered with a towel on your low back  Cover heated items with a towel to avoid burns  Use ice and heat as directed  · Avoid heavy lifting: Your condition may worsen if you lift heavy things  Avoid lifting if possible  · Maintain a healthy weight:  Excess body weight may strain your back  Talk with your healthcare provider about ways to lose excess weight if you are overweight  Contact your healthcare provider or spine specialist if:   · Your pain does not improve within 1 to 3 weeks after treatment  · Your pain and weakness keep you from your normal activities at work, home, or school  · You lose more than 10 pounds in 6 months without trying  · You become depressed or sad because of the pain  · You have questions or concerns about your condition or care  Return to the emergency department if:   · You have a fever greater than 100 4°F for longer than 2 days  · You have new, severe back or leg pain, or your pain spreads to both legs  · You have any new signs of numbness or weakness, especially in your lower back, legs, arms, or genital area  · You have new trouble controlling your urine and bowel movements  · You do not feel like your bladder empties when you urinate      © Copyright [employer] 2020 Information is for End User's use only and may not be sold, redistributed or otherwise used for commercial purposes  All illustrations and images included in CareNotes® are the copyrighted property of [employer] Inc  or Carla Hzael  The above information is an  only  It is not intended as medical advice for individual conditions or treatments  Talk to your doctor, nurse or pharmacist before following any medical regimen to see if it is safe and effective for you

## 2021-06-29 NOTE — PROGRESS NOTES
Assessment  1  Chronic bilateral low back pain with bilateral sciatica    2  Chronic pain syndrome    3  Lumbar disc disease with radiculopathy        Plan  Mr Micah Cleaning is a pleasant 52-year-old male who presents for initial evaluation regarding low back pain with intermittent radiating symptoms into the bilateral lower extremities  During today's evaluation he is demonstrating clinical evidence of low back pain that is likely multifactorial nature with a majority of his pain appears to be localized lumbar spine  Patient just started in chiropractic treatments last week  At this time we will   1  Provide the patient with Cymbalta 30 mg daily to better assist with neuropathic pain control  2  Advised to continue with chiropractic treatments for at least 4-6 weeks  Offered physical therapy but he does not wish to do both at the same time   3  Will provide the patient with physician guided home exercises to be done 3 times per week for 4 weeks or longer if needed  4  Will see him back in 4-6 weeks or sooner if needed  Will likely need an MRI of the lumbar spine to better identify disc and spine pathology contributing to symptoms with likely plan for an epidural steroid injection if his pain gets progressively worse     My impressions and treatment recommendations were discussed in detail with the patient who verbalized understanding and had no further questions  Discharge instructions were provided  I personally saw and examined the patient and I agree with the above discussed plan of care  No orders of the defined types were placed in this encounter      New Medications Ordered This Visit   Medications    DULoxetine (CYMBALTA) 30 mg delayed release capsule     Sig: Take 1 capsule (30 mg total) by mouth daily     Dispense:  30 capsule     Refill:  1       History of Present Illness    Noel Aguilera is a 44 y o  male presents to [place] [id number] [facility] for initial evaluation regarding low back pain with radiating symptoms into the bilateral lower extremities  Patient denies any significant inciting event or recent trauma  Today reports moderate to severe pain rated 8/10 and interfering with daily activities  Pain is nearly constant 60-95% of the time that is present throughout the day and night  Describes symptoms as cramping, sharp, throbbing pain  Also reports lower extremity weakness but denies falls  Does not use any durable medical equipment for ambulation  Symptoms are worse with bending, walking  Has had moderate relief with heat and ice, 10s unit, chiropractic manipulation  Previously tried over-the-counter NSAIDs including Motrin and also a Medrol Dosepak with minimal relief in his pain  Presents today for initial evaluation  I have personally reviewed and/or updated the patient's past medical history, past surgical history, family history, social history, current medications, allergies, and vital signs today  Review of Systems   Constitutional: Positive for unexpected weight change  Negative for fever  HENT: Negative for trouble swallowing  Eyes: Negative for visual disturbance  Respiratory: Negative for shortness of breath and wheezing  Cardiovascular: Negative for chest pain and palpitations  Gastrointestinal: Negative for constipation, diarrhea, nausea and vomiting  Endocrine: Negative for cold intolerance, heat intolerance and polydipsia  Genitourinary: Negative for difficulty urinating and frequency  Musculoskeletal: Positive for back pain, gait problem and joint swelling  Negative for arthralgias and myalgias  Skin: Negative for rash  Neurological: Negative for dizziness, seizures, syncope, weakness and headaches  Hematological: Does not bruise/bleed easily  Psychiatric/Behavioral: Negative for dysphoric mood  All other systems reviewed and are negative        Patient Active Problem List   Diagnosis    Abnormal blood sugar    Benign essential hypertension    Dyslipidemia    Hypothyroidism    Class 3 severe obesity due to excess calories with serious comorbidity in adult [facility])    Prostate cancer screening    Anxiety    Chronic bilateral low back pain with bilateral sciatica       Past Medical History:   Diagnosis Date    Disease of thyroid gland     Hypertension        Past Surgical History:   Procedure Laterality Date    NO PAST SURGERIES         Family History   Problem Relation Age of Onset    Emphysema Mother     Bone cancer Mother    [facility] Breast cancer Mother     Lung cancer Mother     Dementia Father     Stroke Father     Hyperlipidemia Father     Hypertension Father     Diabetes type II Father     Thyroid cancer Sister        Social History     Occupational History    Not on file   Tobacco Use    Smoking status: Never Smoker    Smokeless tobacco: Never Used    Tobacco comment: AT RISK FROM PASSIVE SMOKING    Vaping Use    Vaping Use: Never used   Substance and Sexual Activity    Alcohol use: Yes     Comment: SOCIAL     Drug use: No    Sexual activity: Not on file       Current Outpatient Medications on File Prior to Visit   Medication Sig    ALPRAZolam (XANAX) 0 25 mg tablet Take 1 tablet (0 25 mg total) by mouth daily at bedtime as needed for anxiety (Patient not taking: Reported on 4/5/2021)    hydrochlorothiazide (MICROZIDE) 12 5 mg capsule Take 1 capsule by mouth once daily in the morning    levothyroxine 100 mcg tablet Take 1 tablet by mouth once daily    metoprolol succinate (TOPROL-XL) 50 mg 24 hr tablet Take 1 tablet by mouth once daily    rosuvastatin (CRESTOR) 10 MG tablet Take 1 tablet (10 mg total) by mouth daily     No current facility-administered medications on file prior to visit         No Known Allergies    Physical Exam    /76   Pulse 78   Wt (!) 142 kg (312 lb)   BMI 43 52 kg/m²     Constitutional:  Normal, well developed, well nourished, obese, no acute distress  Eyes: anicteric  HEENT: grossly intact  Neck: supple, symmetric, trachea midline and no masses   Pulmonary:even and unlabored  Cardiovascular:No edema or pitting edema present  Skin:Normal without rashes or lesions and well hydrated  Psychiatric:Mood and affect appropriate  Neurologic:Cranial Nerves II-XII grossly intact  Musculoskeletal:antalgic, tenderness to palpation bilateral lumbar paraspinals, decreased active and passive range of motion with lumbar flexion and extension limited by pain, MMT 5/5 bilateral lower extremities, sensation grossly intact to light touch, positive straight leg raise in the supine position with radicular pain into the right lower extremity, DTRs within normal limits    Imaging

## 2021-07-01 ENCOUNTER — TELEMEDICINE (OUTPATIENT)
Dept: FAMILY MEDICINE CLINIC | Facility: CLINIC | Age: 51
End: 2021-07-01
Payer: COMMERCIAL

## 2021-07-01 VITALS — BODY MASS INDEX: 43.68 KG/M2 | WEIGHT: 312 LBS | HEIGHT: 71 IN

## 2021-07-01 DIAGNOSIS — G47.30 SLEEP APNEA, UNSPECIFIED TYPE: Primary | ICD-10-CM

## 2021-07-01 DIAGNOSIS — I10 BENIGN ESSENTIAL HYPERTENSION: ICD-10-CM

## 2021-07-01 PROCEDURE — 99213 OFFICE O/P EST LOW 20 MIN: CPT | Performed by: FAMILY MEDICINE

## 2021-07-01 PROCEDURE — 3725F SCREEN DEPRESSION PERFORMED: CPT | Performed by: FAMILY MEDICINE

## 2021-07-01 PROCEDURE — 1036F TOBACCO NON-USER: CPT | Performed by: FAMILY MEDICINE

## 2021-07-01 PROCEDURE — 3008F BODY MASS INDEX DOCD: CPT | Performed by: FAMILY MEDICINE

## 2021-07-02 NOTE — ASSESSMENT & PLAN NOTE
Patient has concerns with his snoring  Has history of hypertension, hyperlipidemia  BMI 43 52  Has been trying to lose weight  Will referred to sleep medicine

## 2021-07-26 DIAGNOSIS — E03.9 HYPOTHYROIDISM, UNSPECIFIED TYPE: ICD-10-CM

## 2021-07-26 RX ORDER — LEVOTHYROXINE SODIUM 100 UG/1
TABLET ORAL
Qty: 90 TABLET | Refills: 0 | Status: SHIPPED | OUTPATIENT
Start: 2021-07-26 | End: 2021-10-26 | Stop reason: SDUPTHER

## 2021-08-03 ENCOUNTER — OFFICE VISIT (OUTPATIENT)
Dept: PAIN MEDICINE | Facility: CLINIC | Age: 51
End: 2021-08-03
Payer: COMMERCIAL

## 2021-08-03 VITALS
WEIGHT: 290 LBS | BODY MASS INDEX: 40.45 KG/M2 | DIASTOLIC BLOOD PRESSURE: 82 MMHG | SYSTOLIC BLOOD PRESSURE: 130 MMHG | HEART RATE: 64 BPM

## 2021-08-03 DIAGNOSIS — G89.29 CHRONIC BILATERAL LOW BACK PAIN WITH BILATERAL SCIATICA: Primary | ICD-10-CM

## 2021-08-03 DIAGNOSIS — M54.41 CHRONIC BILATERAL LOW BACK PAIN WITH BILATERAL SCIATICA: Primary | ICD-10-CM

## 2021-08-03 DIAGNOSIS — M46.1 SACROILIITIS (HCC): ICD-10-CM

## 2021-08-03 DIAGNOSIS — M54.42 CHRONIC BILATERAL LOW BACK PAIN WITH BILATERAL SCIATICA: Primary | ICD-10-CM

## 2021-08-03 DIAGNOSIS — M47.816 LUMBAR FACET ARTHROPATHY: ICD-10-CM

## 2021-08-03 PROCEDURE — 99214 OFFICE O/P EST MOD 30 MIN: CPT | Performed by: PHYSICAL MEDICINE & REHABILITATION

## 2021-08-03 PROCEDURE — 3075F SYST BP GE 130 - 139MM HG: CPT | Performed by: PHYSICAL MEDICINE & REHABILITATION

## 2021-08-03 PROCEDURE — 3079F DIAST BP 80-89 MM HG: CPT | Performed by: PHYSICAL MEDICINE & REHABILITATION

## 2021-08-03 PROCEDURE — 1036F TOBACCO NON-USER: CPT | Performed by: PHYSICAL MEDICINE & REHABILITATION

## 2021-08-03 NOTE — PATIENT INSTRUCTIONS
Lumbar Radiculopathy   WHAT YOU NEED TO KNOW:   Lumbar radiculopathy is a painful condition that happens when a nerve in your lumbar spine (lower back) is pinched or irritated  Nerves control feeling and movement in your body  You may have numbness or pain that shoots down from your lower back towards your foot  DISCHARGE INSTRUCTIONS:   Medicines:   · Medicines:     ? NSAIDs , such as ibuprofen, help decrease swelling, pain, and fever  This medicine is available with or without a doctor's order  NSAIDs can cause stomach bleeding or kidney problems in certain people  If you take blood thinner medicine, always ask your healthcare provider if NSAIDs are safe for you  Always read the medicine label and follow directions  ? Muscle relaxers  help decrease pain and muscle spasms  ? Opioids: This is a strong medicine given to reduce severe pain  It is also called narcotic pain medicine  Take this medicine exactly as directed by your healthcare provider  ? Oral steroids: Steroids may also be given to reduce pain and swelling  ? Take your medicine as directed  Contact your healthcare provider if you think your medicine is not helping or if you have side effects  Tell him of her if you are allergic to any medicine  Keep a list of the medicines, vitamins, and herbs you take  Include the amounts, and when and why you take them  Bring the list or the pill bottles to follow-up visits  Carry your medicine list with you in case of an emergency  Follow up with your healthcare provider or spine specialist within 1 to 3 weeks:  After your first follow-up appointment, return to your healthcare provider or spine specialist every 2 weeks until you have healed  Ask for information about physical therapy for your condition  Write down your questions so you remember to ask them during your visits  Physical therapy:  You may need physical therapy to improve your condition   Your physical therapist may teach you certain exercises to improve posture (the way you stand and sit), flexibility, and strength in your lower back  Self care:   · Stay active: It is best to be active when you have lumbar radiculopathy  Your physical therapist or healthcare provider may tell you to take walks to ease yourself back into your daily routine  Avoid long periods of bed rest  Bed rest could worsen your symptoms  Do not move in ways that increase your pain  Ask for more information about the best ways to stay active  · Use ice or heat packs:  Use ice or heat packs as directed on the sore area of your body to decrease the pain and swelling  Put ice in a plastic bag covered with a towel on your low back  Cover heated items with a towel to avoid burns  Use ice and heat as directed  · Avoid heavy lifting: Your condition may worsen if you lift heavy things  Avoid lifting if possible  · Maintain a healthy weight:  Excess body weight may strain your back  Talk with your healthcare provider about ways to lose excess weight if you are overweight  Contact your healthcare provider or spine specialist if:   · Your pain does not improve within 1 to 3 weeks after treatment  · Your pain and weakness keep you from your normal activities at work, home, or school  · You lose more than 10 pounds in 6 months without trying  · You become depressed or sad because of the pain  · You have questions or concerns about your condition or care  Return to the emergency department if:   · You have a fever greater than 100 4°F for longer than 2 days  · You have new, severe back or leg pain, or your pain spreads to both legs  · You have any new signs of numbness or weakness, especially in your lower back, legs, arms, or genital area  · You have new trouble controlling your urine and bowel movements  · You do not feel like your bladder empties when you urinate      © Copyright [employer] 2021 Information is for End User's use only and may not be sold, redistributed or otherwise used for commercial purposes  All illustrations and images included in CareNotes® are the copyrighted property of A D A M [employer] Inc  or Carla Hazel  The above information is an  only  It is not intended as medical advice for individual conditions or treatments  Talk to your doctor, nurse or pharmacist before following any medical regimen to see if it is safe and effective for you

## 2021-08-03 NOTE — PROGRESS NOTES
Assessment:  1  Chronic bilateral low back pain with bilateral sciatica    2  Lumbar facet arthropathy    3  Sacroiliitis [facility])        Plan:  Mr Licha Marrero is a pleasant 64-year-old male who presents for follow-up and re-evaluation regarding continued low back pain with intermittent radiating symptoms into bilateral lower extremities  During today's evaluation he is demonstrating bilateral sacroiliac joint pain as well as lumbar radiculopathy likely in relation to the multilevel lumbar degenerative disc disease as identified on the lumbar x-ray  He has completed greater than 6 weeks of home exercises and chiropractic treatments in the last 6 months with minimal relief in his pain  At this time we will order a lumbar MRI without contrast to better identify disc and spine pathology contributing to symptoms  Also plan for bilateral SI joint injections under fluoro guidance  All questions answered, patient is agreeable with plan  Complete risks and benefits including bleeding, infection, tissue reaction, nerve injury and allergic reaction were discussed  The approach was demonstrated using models and literature was provided  Verbal and written consent was obtained  My impressions and treatment recommendations were discussed in detail with the patient who verbalized understanding and had no further questions  Discharge instructions were provided  I personally saw and examined the patient and I agree with the above discussed plan of care  Orders Placed This Encounter   Procedures    MRI lumbar spine without contrast     Standing Status:   Future     Standing Expiration Date:   8/3/2025     Scheduling Instructions: There is no preparation for this test  Please leave your jewelry and valuables at home, wedding rings are the exception  Magnetic nail polish must be removed prior to arrival for your test  Please bring your insurance cards, a form of photo ID and a list of your medications with you   Arrive 15 minutes prior to your appointment time in order to register  Please bring any prior CT or MRI studies of this area that were not performed at a [facility] facility  To schedule this appointment, please contact Central Scheduling at [phone]  Prior to your appointment, please make sure you complete the MRI Screening Form when you e-Check in for your appointment  This will be available starting 7 days before your appointment in [address]  You may receive an e-mail with an activation code if you do not have a [employer] account  If you do not have access to a device, we will complete your screening at your appointment  Order Specific Question:   What is the patient's sedation requirement? Answer:   No Sedation     Order Specific Question:   Release to patient through [employer]     Answer:   Immediate     Order Specific Question:   Is order priority selected as STAT? Answer:   No     Order Specific Question:   Reason for Exam (FREE TEXT)     Answer:   lumbar DDD and radiculopathy    FL spine and pain procedure     Standing Status:   Future     Standing Expiration Date:   8/3/2025     Order Specific Question:   Reason for Exam:     Answer:   Bilateral SIJ inj     Order Specific Question:   Anticoagulant hold needed? Answer:   No     No orders of the defined types were placed in this encounter  History of Present Illness:  Yojana Kumar is a 44 y o  male who presents for a follow up office visit in regards to Leg Pain (bilateral)  The patients current symptoms include low back pain with radiating symptoms into bilateral lower extremities currently rated 8/10 and interfering with daily activities  Pain is described as an intermittent sharp, pressure-like, throbbing pain that is worse in the morning  Presents today for follow-up and re-evaluation      I have personally reviewed and/or updated the patient's past medical history, past surgical history, family history, social history, current medications, allergies, and vital signs today  Review of Systems   Respiratory: Negative for shortness of breath  Cardiovascular: Negative for chest pain  Gastrointestinal: Negative for constipation, diarrhea, nausea and vomiting  Musculoskeletal: Positive for gait problem and joint swelling  Negative for arthralgias and myalgias  Skin: Negative for rash  Neurological: Negative for dizziness, seizures and weakness  All other systems reviewed and are negative        Patient Active Problem List   Diagnosis    Abnormal blood sugar    Benign essential hypertension    Dyslipidemia    Hypothyroidism    Class 3 severe obesity due to excess calories with serious comorbidity in adult [facility])    Prostate cancer screening    Anxiety    Chronic bilateral low back pain with bilateral sciatica    Sleep apnea       Past Medical History:   Diagnosis Date    Disease of thyroid gland     Hypertension        Past Surgical History:   Procedure Laterality Date    NO PAST SURGERIES         Family History   Problem Relation Age of Onset    Emphysema Mother     Bone cancer Mother    Estuardo Johnson Breast cancer Mother     Lung cancer Mother    Estuardo Johnson Dementia Father     Stroke Father     Hyperlipidemia Father     Hypertension Father     Diabetes type II Father     Thyroid cancer Sister        Social History     Occupational History    Not on file   Tobacco Use    Smoking status: Never Smoker    Smokeless tobacco: Never Used    Tobacco comment: AT RISK FROM PASSIVE SMOKING    Vaping Use    Vaping Use: Never used   Substance and Sexual Activity    Alcohol use: Yes     Comment: SOCIAL     Drug use: No    Sexual activity: Not on file       Current Outpatient Medications on File Prior to Visit   Medication Sig    Euthyrox 100 MCG tablet Take 1 tablet by mouth once daily    hydrochlorothiazide (MICROZIDE) 12 5 mg capsule Take 1 capsule by mouth once daily in the morning    metoprolol succinate (TOPROL-XL) 50 mg 24 hr tablet Take 1 tablet by mouth once daily    naproxen (NAPROSYN) 500 mg tablet Take 1 tablet (500 mg total) by mouth 2 (two) times a day with meals    rosuvastatin (CRESTOR) 10 MG tablet Take 1 tablet (10 mg total) by mouth daily    ALPRAZolam (XANAX) 0 25 mg tablet Take 1 tablet (0 25 mg total) by mouth daily at bedtime as needed for anxiety (Patient not taking: Reported on 7/1/2021)    DULoxetine (CYMBALTA) 30 mg delayed release capsule Take 1 capsule (30 mg total) by mouth daily     No current facility-administered medications on file prior to visit  No Known Allergies    Physical Exam:    /82   Pulse 64   Wt 132 kg (290 lb)   BMI 40 45 kg/m²     Constitutional:normal, well developed, well nourished, alert, in no distress and non-toxic and no overt pain behavior    Eyes:anicteric  HEENT:grossly intact  Neck:supple, symmetric, trachea midline and no masses   Pulmonary:even and unlabored  Cardiovascular:No edema or pitting edema present  Skin:Normal without rashes or lesions and well hydrated  Psychiatric:Mood and affect appropriate  Neurologic:Cranial Nerves II-XII grossly intact  Musculoskeletal:antalgic, tenderness to palpation bilateral lumbar paraspinals and distal to PSIS, decreased active and passive range of motion lumbar flexion and extension limited by pain, MMT 5/5 bilateral lower extremities, sensation decreased to light touch in patchy distribution posterolateral thighs,   POSITIVE George finger bilaterally  POSITIVE Jun's test bilaterally  POSITIVE sacral compression testing bilaterally    Imaging

## 2021-08-03 NOTE — H&P (VIEW-ONLY)
Assessment:  1  Chronic bilateral low back pain with bilateral sciatica    2  Lumbar facet arthropathy    3  Sacroiliitis [facility])        Plan:  Mr Micha Quan is a pleasant 77-year-old male who presents for follow-up and re-evaluation regarding continued low back pain with intermittent radiating symptoms into bilateral lower extremities  During today's evaluation he is demonstrating bilateral sacroiliac joint pain as well as lumbar radiculopathy likely in relation to the multilevel lumbar degenerative disc disease as identified on the lumbar x-ray  He has completed greater than 6 weeks of home exercises and chiropractic treatments in the last 6 months with minimal relief in his pain  At this time we will order a lumbar MRI without contrast to better identify disc and spine pathology contributing to symptoms  Also plan for bilateral SI joint injections under fluoro guidance  All questions answered, patient is agreeable with plan  Complete risks and benefits including bleeding, infection, tissue reaction, nerve injury and allergic reaction were discussed  The approach was demonstrated using models and literature was provided  Verbal and written consent was obtained  My impressions and treatment recommendations were discussed in detail with the patient who verbalized understanding and had no further questions  Discharge instructions were provided  I personally saw and examined the patient and I agree with the above discussed plan of care  Orders Placed This Encounter   Procedures    MRI lumbar spine without contrast     Standing Status:   Future     Standing Expiration Date:   8/3/2025     Scheduling Instructions: There is no preparation for this test  Please leave your jewelry and valuables at home, wedding rings are the exception  Magnetic nail polish must be removed prior to arrival for your test  Please bring your insurance cards, a form of photo ID and a list of your medications with you   Arrive 15 minutes prior to your appointment time in order to register  Please bring any prior CT or MRI studies of this area that were not performed at a [facility] facility  To schedule this appointment, please contact Central Scheduling at [phone]  Prior to your appointment, please make sure you complete the MRI Screening Form when you e-Check in for your appointment  This will be available starting 7 days before your appointment in [address]  You may receive an e-mail with an activation code if you do not have a [employer] account  If you do not have access to a device, we will complete your screening at your appointment  Order Specific Question:   What is the patient's sedation requirement? Answer:   No Sedation     Order Specific Question:   Release to patient through [employer]     Answer:   Immediate     Order Specific Question:   Is order priority selected as STAT? Answer:   No     Order Specific Question:   Reason for Exam (FREE TEXT)     Answer:   lumbar DDD and radiculopathy    FL spine and pain procedure     Standing Status:   Future     Standing Expiration Date:   8/3/2025     Order Specific Question:   Reason for Exam:     Answer:   Bilateral SIJ inj     Order Specific Question:   Anticoagulant hold needed? Answer:   No     No orders of the defined types were placed in this encounter  History of Present Illness:  Lynne Alvarez is a 44 y o  male who presents for a follow up office visit in regards to Leg Pain (bilateral)  The patients current symptoms include low back pain with radiating symptoms into bilateral lower extremities currently rated 8/10 and interfering with daily activities  Pain is described as an intermittent sharp, pressure-like, throbbing pain that is worse in the morning  Presents today for follow-up and re-evaluation      I have personally reviewed and/or updated the patient's past medical history, past surgical history, family history, social history, current medications, allergies, and vital signs today  Review of Systems   Respiratory: Negative for shortness of breath  Cardiovascular: Negative for chest pain  Gastrointestinal: Negative for constipation, diarrhea, nausea and vomiting  Musculoskeletal: Positive for gait problem and joint swelling  Negative for arthralgias and myalgias  Skin: Negative for rash  Neurological: Negative for dizziness, seizures and weakness  All other systems reviewed and are negative        Patient Active Problem List   Diagnosis    Abnormal blood sugar    Benign essential hypertension    Dyslipidemia    Hypothyroidism    Class 3 severe obesity due to excess calories with serious comorbidity in adult [facility])    Prostate cancer screening    Anxiety    Chronic bilateral low back pain with bilateral sciatica    Sleep apnea       Past Medical History:   Diagnosis Date    Disease of thyroid gland     Hypertension        Past Surgical History:   Procedure Laterality Date    NO PAST SURGERIES         Family History   Problem Relation Age of Onset    Emphysema Mother     Bone cancer Mother    [facility] Breast cancer Mother     Lung cancer Mother    Yue Montez Dementia Father     Stroke Father     Hyperlipidemia Father     Hypertension Father     Diabetes type II Father     Thyroid cancer Sister        Social History     Occupational History    Not on file   Tobacco Use    Smoking status: Never Smoker    Smokeless tobacco: Never Used    Tobacco comment: AT RISK FROM PASSIVE SMOKING    Vaping Use    Vaping Use: Never used   Substance and Sexual Activity    Alcohol use: Yes     Comment: SOCIAL     Drug use: No    Sexual activity: Not on file       Current Outpatient Medications on File Prior to Visit   Medication Sig    Euthyrox 100 MCG tablet Take 1 tablet by mouth once daily    hydrochlorothiazide (MICROZIDE) 12 5 mg capsule Take 1 capsule by mouth once daily in the morning    metoprolol succinate (TOPROL-XL) 50 mg 24 hr tablet Take 1 tablet by mouth once daily    naproxen (NAPROSYN) 500 mg tablet Take 1 tablet (500 mg total) by mouth 2 (two) times a day with meals    rosuvastatin (CRESTOR) 10 MG tablet Take 1 tablet (10 mg total) by mouth daily    ALPRAZolam (XANAX) 0 25 mg tablet Take 1 tablet (0 25 mg total) by mouth daily at bedtime as needed for anxiety (Patient not taking: Reported on 7/1/2021)    DULoxetine (CYMBALTA) 30 mg delayed release capsule Take 1 capsule (30 mg total) by mouth daily     No current facility-administered medications on file prior to visit  No Known Allergies    Physical Exam:    /82   Pulse 64   Wt 132 kg (290 lb)   BMI 40 45 kg/m²     Constitutional:normal, well developed, well nourished, alert, in no distress and non-toxic and no overt pain behavior    Eyes:anicteric  HEENT:grossly intact  Neck:supple, symmetric, trachea midline and no masses   Pulmonary:even and unlabored  Cardiovascular:No edema or pitting edema present  Skin:Normal without rashes or lesions and well hydrated  Psychiatric:Mood and affect appropriate  Neurologic:Cranial Nerves II-XII grossly intact  Musculoskeletal:antalgic, tenderness to palpation bilateral lumbar paraspinals and distal to PSIS, decreased active and passive range of motion lumbar flexion and extension limited by pain, MMT 5/5 bilateral lower extremities, sensation decreased to light touch in patchy distribution posterolateral thighs,   POSITIVE George finger bilaterally  POSITIVE Jun's test bilaterally  POSITIVE sacral compression testing bilaterally    Imaging

## 2021-08-04 ENCOUNTER — TELEPHONE (OUTPATIENT)
Dept: PAIN MEDICINE | Facility: MEDICAL CENTER | Age: 51
End: 2021-08-04

## 2021-08-04 ENCOUNTER — TELEPHONE (OUTPATIENT)
Dept: PAIN MEDICINE | Facility: CLINIC | Age: 51
End: 2021-08-04

## 2021-08-04 NOTE — TELEPHONE ENCOUNTER
Pt needs a pre auth from [facility] for MRI- pls sent approval [facility] - pls call for fax@ [phone]       [phone]

## 2021-08-04 NOTE — TELEPHONE ENCOUNTER
Scheduled pt for B/L SIJ for 8/9/21  Went over pre-procedure instructions below:  Nothing to eat or drink 1 hr prior to procedure  Need to arrange transportation  Proper clothing for procedure  If ill or placed on antibiotics please call to reschedule  Covid/travel/ and vaccine instructions

## 2021-08-05 DIAGNOSIS — I10 BENIGN ESSENTIAL HYPERTENSION: ICD-10-CM

## 2021-08-05 RX ORDER — HYDROCHLOROTHIAZIDE 12.5 MG/1
CAPSULE, GELATIN COATED ORAL
Qty: 90 CAPSULE | Refills: 0 | OUTPATIENT
Start: 2021-08-05

## 2021-08-05 RX ORDER — HYDROCHLOROTHIAZIDE 12.5 MG/1
12.5 CAPSULE, GELATIN COATED ORAL EVERY MORNING
Qty: 90 CAPSULE | Refills: 0 | Status: SHIPPED | OUTPATIENT
Start: 2021-08-05 | End: 2021-10-26 | Stop reason: SDUPTHER

## 2021-08-05 RX ORDER — METOPROLOL SUCCINATE 50 MG/1
TABLET, EXTENDED RELEASE ORAL
Qty: 90 TABLET | Refills: 0 | OUTPATIENT
Start: 2021-08-05

## 2021-08-05 RX ORDER — METOPROLOL SUCCINATE 50 MG/1
50 TABLET, EXTENDED RELEASE ORAL DAILY
Qty: 90 TABLET | Refills: 0 | Status: SHIPPED | OUTPATIENT
Start: 2021-08-05 | End: 2021-10-26 | Stop reason: SDUPTHER

## 2021-08-06 NOTE — TELEPHONE ENCOUNTER
Spoke to patient he doesn't know where he is going for his MRI I advised in order for me to get his auth I need a facility and date

## 2021-08-09 ENCOUNTER — TELEPHONE (OUTPATIENT)
Dept: PAIN MEDICINE | Facility: CLINIC | Age: 51
End: 2021-08-09

## 2021-08-09 NOTE — TELEPHONE ENCOUNTER
Spoke to patient and he states after talking to  he would receive a much better insurance benefit if his care was done in [place]  Spoke to Dr Shasta Clalahan and he is ok with patient transferring and I also spoke to Dr Anali Ley who said it was ok for patient to be scheduled for injection with him then transfer over to him  Could both doctors please confirm on here that this is ok so Constantin Hewitt can schedule him   Thanks

## 2021-08-09 NOTE — TELEPHONE ENCOUNTER
Scheduled pt for SIJ for 8/26/21  Went over pre-procedure instructions below:  Nothing to eat or drink 1 hr prior to procedure  Need to arrange transportation  Proper clothing for procedure  No vaccines 2 weeks prior or after procedure  If ill or placed on antibiotics please call to reschedule

## 2021-08-09 NOTE — TELEPHONE ENCOUNTER
Scheduled pt for SIJ for 8/26/21    Went over pre-procedure instructions below:  Nothing to eat or drink 1 hr prior to procedure  Need to arrange transportation  Proper clothing for procedure  No vaccines 2 weeks prior or after procedure  If ill or placed on antibiotics please call to reschedule   Will call if there is a cancellation

## 2021-08-13 ENCOUNTER — HOSPITAL ENCOUNTER (OUTPATIENT)
Facility: AMBULARY SURGERY CENTER | Age: 51
Setting detail: OUTPATIENT SURGERY
Discharge: HOME | End: 2021-08-13
Attending: ANESTHESIOLOGY | Admitting: ANESTHESIOLOGY
Payer: COMMERCIAL

## 2021-08-13 ENCOUNTER — APPOINTMENT (OUTPATIENT)
Dept: RADIOLOGY | Facility: HOSPITAL | Age: 51
End: 2021-08-13
Payer: COMMERCIAL

## 2021-08-13 ENCOUNTER — HOSPITAL ENCOUNTER (OUTPATIENT)
Dept: RADIOLOGY | Facility: HOSPITAL | Age: 51
Discharge: HOME | End: 2021-08-13
Attending: PHYSICAL MEDICINE & REHABILITATION
Payer: COMMERCIAL

## 2021-08-13 VITALS
SYSTOLIC BLOOD PRESSURE: 129 MMHG | HEIGHT: 71 IN | BODY MASS INDEX: 40.6 KG/M2 | RESPIRATION RATE: 18 BRPM | WEIGHT: 290 LBS | TEMPERATURE: 98.3 F | OXYGEN SATURATION: 98 % | DIASTOLIC BLOOD PRESSURE: 83 MMHG | HEART RATE: 67 BPM

## 2021-08-13 DIAGNOSIS — M54.41 CHRONIC BILATERAL LOW BACK PAIN WITH BILATERAL SCIATICA: ICD-10-CM

## 2021-08-13 DIAGNOSIS — M47.816 LUMBAR FACET ARTHROPATHY: ICD-10-CM

## 2021-08-13 DIAGNOSIS — G89.29 CHRONIC BILATERAL LOW BACK PAIN WITH BILATERAL SCIATICA: ICD-10-CM

## 2021-08-13 DIAGNOSIS — M54.42 CHRONIC BILATERAL LOW BACK PAIN WITH BILATERAL SCIATICA: ICD-10-CM

## 2021-08-13 PROCEDURE — 72148 MRI LUMBAR SPINE W/O DYE: CPT

## 2021-08-13 PROCEDURE — 27096 INJECT SACROILIAC JOINT: CPT | Performed by: ANESTHESIOLOGY

## 2021-08-13 PROCEDURE — 72020 X-RAY EXAM OF SPINE 1 VIEW: CPT

## 2021-08-13 PROCEDURE — G1004 CDSM NDSC: HCPCS

## 2021-08-13 RX ORDER — BUPIVACAINE HYDROCHLORIDE 2.5 MG/ML
INJECTION, SOLUTION EPIDURAL; INFILTRATION; INTRACAUDAL AS NEEDED
Status: DISCONTINUED | OUTPATIENT
Start: 2021-08-13 | End: 2021-08-13 | Stop reason: HOSPADM

## 2021-08-13 RX ORDER — METHYLPREDNISOLONE ACETATE 80 MG/ML
INJECTION, SUSPENSION INTRA-ARTICULAR; INTRALESIONAL; INTRAMUSCULAR; SOFT TISSUE AS NEEDED
Status: DISCONTINUED | OUTPATIENT
Start: 2021-08-13 | End: 2021-08-13 | Stop reason: HOSPADM

## 2021-08-13 RX ORDER — LIDOCAINE WITH 8.4% SOD BICARB 0.9%(10ML)
SYRINGE (ML) INJECTION AS NEEDED
Status: DISCONTINUED | OUTPATIENT
Start: 2021-08-13 | End: 2021-08-13 | Stop reason: HOSPADM

## 2021-08-13 NOTE — OP NOTE
ATTENDING PHYSICIAN:  Ciro Garcia MD     PROCEDURE: Bilateral sacroiliac joint injection with steroid and local anesthetic under fluoroscopic guidance  PREPROCEDURE DIAGNOSIS:  Sacroiliitis  POSTPROCEDURE DIAGNOSIS: Sacroiliitis  ANESTHESIA:  Local     ESTIMATED BLOOD LOSS:  Minimal     COMPLICATIONS:  None  LOCATION:  [address]  CONSENT:  Today's procedure, its potential benefits as well as its risks and potential side effects were reviewed  Discussed risks of the procedure including bleeding, infection, nerve irritation or damage, reactions to the medications, headache, failure of the pain to improve, and potential worsening of the pain were explained to the patient who verbalized understanding and who wished to proceed  Written informed consent was thereby obtained  DESCRIPTION OF THE PROCEDURE:  After written informed consent was obtained, the patient was taken to the fluoroscopy suite and placed in the prone position  Anatomical landmarks were identified by way of fluoroscopy in multiple views  The skin overlying the sacroiliac region was prepped using antiseptic and draped in the usual sterile fashion  Strict aseptic technique was utilized  The skin and subcutaneous tissues at the needle entry site were infiltrated with 5 mL of 1% preservative-free lidocaine using a 25-gauge 1-1/2-inch needle  A 22-gauge 3-1/2-inch needle was then incrementally advanced using multiple fluoroscopic views into the inferior posterior pole of the left sacroiliac joint  Proper needle placement was confirmed with the aid of fluoroscopy in the AP and lateral views  After negative aspiration, contrast was injected which delineated the sacroiliac joint under fluoroscopy in the AP view  After negative aspiration was reconfirmed, a 3 mL of a mixture consisting of 5 mL of preservative-free 0 25% bupivacaine mixed with 1 mL of 80 mg/mL of Depo-Medrol was slowly injected      Attention was then directed to the right sacroiliac joint  A 22-gauge 5-inch needle was incrementally advanced using multiple fluoroscopic views into the inferior posterior pole of the right sacroiliac joint  Proper needle placement was confirmed with the aid of fluoroscopy in the AP and lateral views  After negative aspiration, contrast was injected which delineated the right sacroiliac joint under fluoroscopy in the AP view  After negative aspiration was reconfirmed, the remaining 3 mL of the mixture mentioned above was slowly injected  The patient tolerated the procedure well and all needles were removed with the tips intact  Hemostasis was maintained  There were no apparent paresthesias or complications  The skin was wiped clean and a Band-Aid was placed as appropriate  The patient was monitored for an appropriate period of time following the procedure and remained hemodynamically stable and neurovascularly intact following the procedure  The patient was ultimately discharged to home with supervision in good condition and instructed to call the office in a few days for an update or sooner as warranted  I was present and participated in all key and critical portions of this procedure      Simeon Wynne MD  8/13/2021  11:06 AM

## 2021-08-13 NOTE — DISCHARGE INSTRUCTIONS

## 2021-08-17 ENCOUNTER — TELEPHONE (OUTPATIENT)
Dept: RADIOLOGY | Facility: CLINIC | Age: 51
End: 2021-08-17

## 2021-08-17 NOTE — TELEPHONE ENCOUNTER
Nothing to add at this time  Dr Katherine Griffin laid it out beautifully  We will see how he does with the injection he just has and if needed I will see him back in the office to consider a SUZAN (I have not actually seen him on the office side yet due to the [place])

## 2021-08-17 NOTE — TELEPHONE ENCOUNTER
S/w pt and advised of same  Pt verbalized understanding  Pt requesting to schedule a follow up with AS for GIL and s/p SIJ inj done on 8/13        Please contact pt to schedule-

## 2021-08-17 NOTE — TELEPHONE ENCOUNTER
JOHANNY Pt:    See below  Pt had SIJ with AS on 8/13 after transfer of care from [address] [place] [address]   Lumbar MRI done same day  AS, please review MRI results  Anything to add before calling pt?

## 2021-08-17 NOTE — TELEPHONE ENCOUNTER
----- Message from Nimisha Alcazar DO sent at 8/16/2021  3:52 PM EDT -----  Please notify patient of MRI lumbar spine results demonstrating multilevel degenerative disc disease with disc desiccation most notable at L4-L5 with both central and foraminal narrowing likely contributing to his ongoing radicular symptoms  For now we will continue with the bilateral SI joint injection plan but may eventually need to consider a lumbar epidural steroid injection at L4-L5    Thank you  ----- Message -----  From: Interface, Radiology Results In  Sent: 8/16/2021   3:06 PM EDT  To: Nimisha Alcazar DO

## 2021-08-20 ENCOUNTER — TELEPHONE (OUTPATIENT)
Dept: PAIN MEDICINE | Facility: CLINIC | Age: 51
End: 2021-08-20

## 2021-08-20 DIAGNOSIS — G89.4 CHRONIC PAIN SYNDROME: Primary | ICD-10-CM

## 2021-08-20 NOTE — TELEPHONE ENCOUNTER
Pt reports 40% improvement post inj   Pain level 4/10   Pt aware I will call next week for an update

## 2021-08-27 RX ORDER — GABAPENTIN 300 MG/1
300 CAPSULE ORAL 3 TIMES DAILY
Qty: 90 CAPSULE | Refills: 0 | Status: SHIPPED | OUTPATIENT
Start: 2021-08-27 | End: 2021-09-24 | Stop reason: ALTCHOICE

## 2021-08-27 NOTE — TELEPHONE ENCOUNTER
S/w pt, he said he does not think an anti-inflammatory would really help him and wanted to know if AS would prescribe Gabapentin  Pt said he would take it when he got home from work at around 6 pm to try to combat the drowsiness s/e       Monse Weems, please assist in scheduling L4-5 ALBERT, thank you

## 2021-08-27 NOTE — TELEPHONE ENCOUNTER
As stated below, pt is taking Naproxen 500 mg BID  Pt would like to move forward with injection asap

## 2021-08-27 NOTE — TELEPHONE ENCOUNTER
NSAIDs and acetaminophen are the only non-sedating medications I can prescribe for him  If he wants to try a different NSAID, I am happy to, but it may work better or worse than the naproxen

## 2021-08-27 NOTE — TELEPHONE ENCOUNTER
Scheduled patient for 9/17/21  Patient denies RX blood thinners/ NSAIDS  Nothing to eat or drink 1 hour prior to procedure  Needs to arrange transportation  Proper clothing for procedure  No vaccines 2 weeks prior or after procedure  If ill or place on antibiotics, please call to reschedule

## 2021-08-27 NOTE — TELEPHONE ENCOUNTER
Pt reports still about 40% improvement post inj   Pain level 5/10  Pt said he is suppose to go back to work Monday but he is worried because of the pain he has and that work would aggravate it  He also has a lot questions about what exactly is wrong with him

## 2021-08-27 NOTE — TELEPHONE ENCOUNTER
S/w the pt at length  Pt said the b/l SIJ injections that he had provided minimal relief and he is nervous that he will have to live with this pain forever  Pt asked RN to review his MRI again with him, but in laymans terms  RN reviewed  Pt said that Dr Kareem Moody had mentioned that based on his MRI results, he would suggest a LESI @ L4-5, pt asking if AS can perform? 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

## 2021-08-27 NOTE — TELEPHONE ENCOUNTER
There was a cancellation, so Oliverio Armijo has been moved up to 9/3/21    Pt is aware and happy with the change

## 2021-08-27 NOTE — TELEPHONE ENCOUNTER
We can have the patient trial a NSAID  Has he tried any in the past that have worked for him? As far as the L4-L5 LESI, I am fine with proceeding with that if he wishes

## 2021-08-28 LAB
ALBUMIN SERPL-MCNC: 4.1 G/DL (ref 3.8–4.9)
ALBUMIN/GLOB SERPL: 1.7 {RATIO} (ref 1.2–2.2)
ALP SERPL-CCNC: 83 IU/L (ref 48–121)
ALT SERPL-CCNC: 48 IU/L (ref 0–44)
AST SERPL-CCNC: 25 IU/L (ref 0–40)
BILIRUB SERPL-MCNC: 0.5 MG/DL (ref 0–1.2)
BUN SERPL-MCNC: 18 MG/DL (ref 6–24)
BUN/CREAT SERPL: 23 (ref 9–20)
CALCIUM SERPL-MCNC: 8.8 MG/DL (ref 8.7–10.2)
CHLORIDE SERPL-SCNC: 102 MMOL/L (ref 96–106)
CHOLEST SERPL-MCNC: 173 MG/DL (ref 100–199)
CHOLEST/HDLC SERPL: 4 RATIO (ref 0–5)
CO2 SERPL-SCNC: 25 MMOL/L (ref 20–29)
CREAT SERPL-MCNC: 0.8 MG/DL (ref 0.76–1.27)
GLOBULIN SER-MCNC: 2.4 G/DL (ref 1.5–4.5)
GLUCOSE SERPL-MCNC: 86 MG/DL (ref 65–99)
HDLC SERPL-MCNC: 43 MG/DL
LDLC SERPL CALC-MCNC: 114 MG/DL (ref 0–99)
POTASSIUM SERPL-SCNC: 4 MMOL/L (ref 3.5–5.2)
PROT SERPL-MCNC: 6.5 G/DL (ref 6–8.5)
SL AMB EGFR AFRICAN AMERICAN: 119 ML/MIN/1.73
SL AMB EGFR NON AFRICAN AMERICAN: 103 ML/MIN/1.73
SL AMB VLDL CHOLESTEROL CALC: 16 MG/DL (ref 5–40)
SODIUM SERPL-SCNC: 139 MMOL/L (ref 134–144)
TRIGL SERPL-MCNC: 88 MG/DL (ref 0–149)
TSH SERPL DL<=0.005 MIU/L-ACNC: 3.47 UIU/ML (ref 0.45–4.5)

## 2021-09-03 ENCOUNTER — HOSPITAL ENCOUNTER (OUTPATIENT)
Facility: AMBULARY SURGERY CENTER | Age: 51
Setting detail: OUTPATIENT SURGERY
Discharge: HOME | End: 2021-09-03
Attending: ANESTHESIOLOGY | Admitting: ANESTHESIOLOGY
Payer: COMMERCIAL

## 2021-09-03 ENCOUNTER — APPOINTMENT (OUTPATIENT)
Dept: RADIOLOGY | Facility: HOSPITAL | Age: 51
End: 2021-09-03
Payer: COMMERCIAL

## 2021-09-03 VITALS
HEIGHT: 71 IN | WEIGHT: 290 LBS | RESPIRATION RATE: 18 BRPM | HEART RATE: 71 BPM | BODY MASS INDEX: 40.6 KG/M2 | OXYGEN SATURATION: 99 % | SYSTOLIC BLOOD PRESSURE: 153 MMHG | TEMPERATURE: 98.2 F | DIASTOLIC BLOOD PRESSURE: 93 MMHG

## 2021-09-03 PROCEDURE — 72020 X-RAY EXAM OF SPINE 1 VIEW: CPT

## 2021-09-03 PROCEDURE — 62323 NJX INTERLAMINAR LMBR/SAC: CPT | Performed by: ANESTHESIOLOGY

## 2021-09-03 RX ORDER — METHYLPREDNISOLONE ACETATE 80 MG/ML
INJECTION, SUSPENSION INTRA-ARTICULAR; INTRALESIONAL; INTRAMUSCULAR; SOFT TISSUE AS NEEDED
Status: DISCONTINUED | OUTPATIENT
Start: 2021-09-03 | End: 2021-09-03 | Stop reason: HOSPADM

## 2021-09-03 RX ORDER — LIDOCAINE WITH 8.4% SOD BICARB 0.9%(10ML)
SYRINGE (ML) INJECTION AS NEEDED
Status: DISCONTINUED | OUTPATIENT
Start: 2021-09-03 | End: 2021-09-03 | Stop reason: HOSPADM

## 2021-09-03 NOTE — OP NOTE
for CSF or heme, a 6 mL injectate consisting of 1 mL of Depo-Medrol 80 mg/mL and 1 mL of Depo-Medrol 40 mg/mL mixed with 4 mL of preservative-free normal saline was slowly injected  The patient tolerated the procedure well and all needles were removed with the tips intact  Hemostasis was maintained  There were no apparent paresthesias or complications  The skin was wiped clean and a Band-Aid was placed as appropriate  The patient was monitored for an appropriate period of time following the procedure and remained hemodynamically stable and neurovascularly intact following the procedure  The patient was ultimately discharged to home with supervision in good condition and instructed to call the office in a few days for an update or sooner as warranted  I was present and participated in all key and critical portions of this procedure      Matt Simpson MD  9/3/2021  11:08 AM

## 2021-09-03 NOTE — DISCHARGE INSTRUCTIONS
Epidural Steroid Injection   WHAT YOU NEED TO KNOW:   An epidural steroid injection (SUZAN) is a procedure to inject steroid medicine into the epidural space  The epidural space is between your spinal cord and vertebrae  Steroids reduce inflammation and fluid buildup in your spine that may be causing pain  You may be given pain medicine along with the steroids  ACTIVITY  · Do not drive or operate machinery today  · No strenuous activity today - bending, lifting, etc   · You may resume normal activites starting tomorrow - start slowly and as tolerated  · You may shower today, but no tub baths or hot tubs  · You may have numbness for several hours from the local anesthetic  Please use caution and common sense, especially with weight-bearing activities  CARE OF THE INJECTION SITE  · If you have soreness or pain, apply ice to the area today (20 minutes on/20 minutes off)  · Starting tomorrow, you may use warm, moist heat or ice if needed  · You may have an increase or change in your discomfort for 36-48 hours after your treatment  · Apply ice and continue with any pain medication you have been prescribed  · Notify [facility] if you have any of the following: redness, drainage, swelling, headache, stiff neck or fever above 100°F     SPECIAL INSTRUCTIONS  · Our office will contact you in approximately 7 days for a progress report  MEDICATIONS  · Continue to take all routine medications  · Our office may have instructed you to hold some medications  As no general anesthesia was used in today's procedure, you should not experience any side effects related to anesthesia  If you have a problem specifically related to your procedure, please call our office at [phone]  Problems not related to your procedure should be directed to your primary care physician

## 2021-09-03 NOTE — H&P
History of Present Illness: The patient is a 44 y o  male who presents with complaints of low back pain  Patient Active Problem List   Diagnosis    Abnormal blood sugar    Benign essential hypertension    Dyslipidemia    Hypothyroidism    Class 3 severe obesity due to excess calories with serious comorbidity in adult [facility])    Prostate cancer screening    Anxiety    Chronic bilateral low back pain with bilateral sciatica    Sleep apnea       Past Medical History:   Diagnosis Date    Disease of thyroid gland     Hypertension        Past Surgical History:   Procedure Laterality Date    NO PAST SURGERIES      MD INJECTION,SACROILIAC JOINT Bilateral 8/13/2021    Procedure: SACROILIAC JOINT INJECTION (60001); Surgeon: Leo Cuadra MD;  Location: [facility] MAIN OR;  Service: Pain Management        No current facility-administered medications for this encounter  No Known Allergies    Physical Exam:   Vitals:    09/03/21 1020   BP: 128/86   Pulse: 82   Resp: 18   Temp: 98 2 °F (36 8 °C)   SpO2: 95%     General: Awake, Alert, Oriented x 3, Mood and affect appropriate  Respiratory: Respirations even and unlabored  Cardiovascular: Peripheral pulses intact; no edema  Musculoskeletal Exam:  Tenderness in lumbar spine region  ASA Score: 2         Assessment:  Low back pain      Plan:  Proceed with L4-L5 lumbar epidural steroid injection

## 2021-09-08 ENCOUNTER — RA CDI HCC (OUTPATIENT)
Dept: OTHER | Facility: HOSPITAL | Age: 51
End: 2021-09-08

## 2021-09-08 NOTE — PROGRESS NOTES
[facility]  coding opportunities       Chart reviewed, no opportunity found: CHART REVIEWED, NO OPPORTUNITY FOUND                        Patients insurance company: [employer] (Medicare and Commercial for [employer] and [employer])

## 2021-09-10 ENCOUNTER — TELEPHONE (OUTPATIENT)
Dept: PAIN MEDICINE | Facility: CLINIC | Age: 51
End: 2021-09-10

## 2021-09-10 NOTE — TELEPHONE ENCOUNTER
Pt reports 50-60% improvement and 3/10 if walking  Pt reported still having pain in his lower leg and was wondering if this injection would help that?

## 2021-09-16 ENCOUNTER — TELEMEDICINE (OUTPATIENT)
Dept: FAMILY MEDICINE CLINIC | Facility: CLINIC | Age: 51
End: 2021-09-16
Payer: COMMERCIAL

## 2021-09-16 VITALS
SYSTOLIC BLOOD PRESSURE: 120 MMHG | HEIGHT: 71 IN | DIASTOLIC BLOOD PRESSURE: 77 MMHG | WEIGHT: 315 LBS | BODY MASS INDEX: 44.1 KG/M2

## 2021-09-16 DIAGNOSIS — E03.9 HYPOTHYROIDISM, UNSPECIFIED TYPE: Primary | ICD-10-CM

## 2021-09-16 DIAGNOSIS — E78.5 DYSLIPIDEMIA: ICD-10-CM

## 2021-09-16 DIAGNOSIS — R73.09 ABNORMAL BLOOD SUGAR: ICD-10-CM

## 2021-09-16 DIAGNOSIS — I10 BENIGN ESSENTIAL HYPERTENSION: ICD-10-CM

## 2021-09-16 PROCEDURE — 99214 OFFICE O/P EST MOD 30 MIN: CPT | Performed by: FAMILY MEDICINE

## 2021-09-16 PROCEDURE — 3078F DIAST BP <80 MM HG: CPT | Performed by: FAMILY MEDICINE

## 2021-09-16 PROCEDURE — 3074F SYST BP LT 130 MM HG: CPT | Performed by: FAMILY MEDICINE

## 2021-09-16 PROCEDURE — 1036F TOBACCO NON-USER: CPT | Performed by: FAMILY MEDICINE

## 2021-09-16 PROCEDURE — 3008F BODY MASS INDEX DOCD: CPT | Performed by: FAMILY MEDICINE

## 2021-09-16 NOTE — ASSESSMENT & PLAN NOTE
Past history of elevated fasting blood sugar, glycemic control has improved with lifestyle modifications  Continue monitoring

## 2021-09-16 NOTE — ASSESSMENT & PLAN NOTE
Cholesterol improved significantly on Crestor 10 milligram   Continue same    Recommended metabolic labs/follow-up at 6 month interval

## 2021-09-16 NOTE — ASSESSMENT & PLAN NOTE
The patient's blood pressure is at goal   Continue hydrochlorothiazide 12 5, metoprolol XL 50 milligram

## 2021-09-16 NOTE — PROGRESS NOTES
Her  Virtual Regular Visit    Verification of patient location:    Patient is located in the following state in which I hold an active license [place]      Assessment/Plan:    Problem List Items Addressed This Visit        Endocrine    Hypothyroidism - Primary     TSH is at goal   Continue levothyroxine 100 micrograms         Relevant Orders    TSH, 3rd generation with Free T4 reflex       Cardiovascular and Mediastinum    Benign essential hypertension     The patient's blood pressure is at goal   Continue hydrochlorothiazide 12 5, metoprolol XL 50 milligram          Relevant Orders    Comprehensive metabolic panel       Other    Abnormal blood sugar     Past history of elevated fasting blood sugar, glycemic control has improved with lifestyle modifications  Continue monitoring  Relevant Orders    Comprehensive metabolic panel    Hemoglobin A1C    Dyslipidemia     Cholesterol improved significantly on Crestor 10 milligram   Continue same  Recommended metabolic labs/follow-up at 6 month interval         Relevant Orders    Lipid panel               Reason for visit is   Chief Complaint   Patient presents with    Follow-up    Virtual Regular Visit        Encounter provider Boston Cortez MD    Provider located at [address] [place] [id number]      Recent Visits  No visits were found meeting these conditions  Showing recent visits within past 7 days and meeting all other requirements  Today's Visits  Date Type Provider Dept   09/16/21 Telemedicine Boston Cortez MD [facility]   Showing today's visits and meeting all other requirements  Future Appointments  No visits were found meeting these conditions  Showing future appointments within next 150 days and meeting all other requirements       The patient was identified by name and date of birth   Maevemaribell Duque was informed that this is a telemedicine visit and that the visit is being conducted through [address] [employer] and patient was informed that this is a secure, HIPAA-compliant platform  He agrees to proceed     My office door was closed  No one else was in the room  He acknowledged consent and understanding of privacy and security of the video platform  The patient has agreed to participate and understands they can discontinue the visit at any time  Patient is aware this is a billable service  Subjective  Tanya Capps is a 44 y o  male    Hypertension  This is a chronic problem  The current episode started more than 1 year ago  The problem is controlled  Pertinent negatives include no chest pain, headaches, palpitations, peripheral edema or shortness of breath  Risk factors for coronary artery disease include male gender, obesity and dyslipidemia  Treatments tried: Hydrochlorothiazide 12 5, metoprolol XL 50 milligram  The current treatment provides significant improvement  Identifiable causes of hypertension include a thyroid problem  Hyperlipidemia  This is a chronic problem  The current episode started more than 1 year ago  The problem is controlled  Recent lipid tests were reviewed and are normal  Pertinent negatives include no chest pain, myalgias or shortness of breath  Current antihyperlipidemic treatment includes statins  The current treatment provides significant improvement of lipids  There are no compliance problems  Risk factors for coronary artery disease include dyslipidemia, hypertension, male sex and obesity  Thyroid Problem  Presents for follow-up visit  Patient reports no palpitations or tremors  The symptoms have been stable (Levothyroxine 100 micrograms)  His past medical history is significant for hyperlipidemia          Past Medical History:   Diagnosis Date    Disease of thyroid gland     Hypertension        Past Surgical History:   Procedure Laterality Date    EPIDURAL BLOCK INJECTION N/A 9/3/2021    Procedure: L4 L5 lumbar epidural steroid injection (98397); Surgeon: Colleen Jack MD;  Location: [facility] MAIN OR;  Service: Pain Management     NO PAST SURGERIES      VA INJECTION,SACROILIAC JOINT Bilateral 8/13/2021    Procedure: SACROILIAC JOINT INJECTION (54280); Surgeon: Colleen Jack MD;  Location: [facility] MAIN OR;  Service: Pain Management        Current Outpatient Medications   Medication Sig Dispense Refill    Euthyrox 100 MCG tablet Take 1 tablet by mouth once daily 90 tablet 0    gabapentin (NEURONTIN) 300 mg capsule Take 1 capsule (300 mg total) by mouth 3 (three) times a day 90 capsule 0    hydrochlorothiazide (MICROZIDE) 12 5 mg capsule Take 1 capsule (12 5 mg total) by mouth every morning 90 capsule 0    metoprolol succinate (TOPROL-XL) 50 mg 24 hr tablet Take 1 tablet (50 mg total) by mouth daily 90 tablet 0    rosuvastatin (CRESTOR) 10 MG tablet Take 1 tablet (10 mg total) by mouth daily 90 tablet 1    ALPRAZolam (XANAX) 0 25 mg tablet Take 1 tablet (0 25 mg total) by mouth daily at bedtime as needed for anxiety (Patient not taking: Reported on 9/16/2021) 30 tablet 0    DULoxetine (CYMBALTA) 30 mg delayed release capsule Take 1 capsule (30 mg total) by mouth daily (Patient not taking: Reported on 9/16/2021) 30 capsule 1    naproxen (NAPROSYN) 500 mg tablet Take 1 tablet (500 mg total) by mouth 2 (two) times a day with meals (Patient not taking: Reported on 9/16/2021) 60 tablet 1     No current facility-administered medications for this visit  No Known Allergies    Review of Systems   Constitutional: Negative  Respiratory: Negative  Negative for shortness of breath  Cardiovascular: Negative  Negative for chest pain and palpitations  Gastrointestinal: Negative  Endocrine: Negative  Genitourinary: Negative  Musculoskeletal: Negative  Negative for myalgias  Allergic/Immunologic: Negative  Neurological: Negative  Negative for tremors and headaches  Psychiatric/Behavioral: Negative          Video Exam    Vitals: 09/16/21 1624   BP: 120/77   Weight: (!) 148 kg (326 lb)   Height: 5' 11" (1 803 m)       Physical Exam  Constitutional:       Appearance: He is well-developed  HENT:      Mouth/Throat:      Pharynx: No oropharyngeal exudate  Eyes:      Pupils: Pupils are equal, round, and reactive to light  Cardiovascular:      Rate and Rhythm: Normal rate and regular rhythm  Pulmonary:      Effort: Pulmonary effort is normal       Breath sounds: Normal breath sounds  Abdominal:      General: Bowel sounds are normal       Palpations: Abdomen is soft  Musculoskeletal:         General: Normal range of motion  Cervical back: Normal range of motion  Neurological:      Mental Status: He is alert and oriented to person, place, and time  Psychiatric:         Behavior: Behavior normal          Judgment: Judgment normal           I spent 25 minutes with patient today in which greater than 50% of the time was spent in counseling/coordination of care regarding Reviewing labs, management of symptoms    VIRTUAL VISIT DISCLAIMER      Sarahi Eisenberg verbally agrees to participate in [employer]  Pt is aware that [employer] could be limited without vital signs or the ability to perform a full hands-on physical Mireya Wright understands he or the provider may request at any time to terminate the video visit and request the patient to seek care or treatment in person

## 2021-09-20 ENCOUNTER — TELEPHONE (OUTPATIENT)
Dept: PAIN MEDICINE | Facility: CLINIC | Age: 51
End: 2021-09-20

## 2021-09-20 NOTE — TELEPHONE ENCOUNTER
RN s/w pt  Pt states that he still has some leg pain s/p LESI on 9/3 but woke up this AM with a new pain that "came out of nowhere"  Pt states that when he sat up this am he felt a tight annoying pressure in his left lower back and  buttock that he has never had before  It is rated a #8/10  Pt took his scheduled Gabapentin  Pt advised that he can use ice and or heat for the pain  Pt states that he stopped taking Naprosyn after he started on gabapentin, so pt advised to take the Naprosyn now and that he could add tylenol 1000 mg every 8 hours to a max of 3000mg /24hrs  Pt does not know what caused the pain but would like further recommendation from AS  Should pt come in for OVS?   Please advise

## 2021-09-20 NOTE — TELEPHONE ENCOUNTER
----- Message from Michell Araya RN sent at 9/20/2021  8:13 AM EDT -----  Regarding: FW: Visit Follow-Up Question  Contact: [phone]    ----- Message -----  From: Kiera Brown RN  Sent: 9/20/2021   7:50 AM EDT  To: [facility]  Subject: FW: Visit Follow-Up Question                       ----- Message -----  From: Tanya Capps  Sent: 9/20/2021   7:29 AM EDT  To: [facility]  Subject: Visit Follow-Up Question                         Good morning  I am still confused as I think I am supposed to be under Dr Mazin aaron now for insurance purposes  I woke up today with a terrible pain in my left buttock which is causing me to barely walk  I need to hold on to anything to get around  Is there something you can prescribe me or give me another epidural? I can't stand this anymore  Please forward this message to Dr Stacy Calderón  Thank you     Shira Vazquez

## 2021-09-21 ENCOUNTER — TELEPHONE (OUTPATIENT)
Dept: OBGYN CLINIC | Facility: CLINIC | Age: 51
End: 2021-09-21

## 2021-09-24 ENCOUNTER — TELEPHONE (OUTPATIENT)
Dept: PAIN MEDICINE | Facility: CLINIC | Age: 51
End: 2021-09-24

## 2021-09-24 DIAGNOSIS — G89.4 CHRONIC PAIN SYNDROME: ICD-10-CM

## 2021-09-24 RX ORDER — TOPIRAMATE 50 MG/1
50 TABLET, FILM COATED ORAL 2 TIMES DAILY
Qty: 120 TABLET | Refills: 0 | Status: SHIPPED | OUTPATIENT
Start: 2021-09-24 | End: 2021-10-25 | Stop reason: SDUPTHER

## 2021-09-24 NOTE — TELEPHONE ENCOUNTER
Patient   [phone]  Dr Arvizu    Patient is calling in stating that he believes he is having a medication reaction  His feet are swollen, there no pain while walking  Also the pain is coming back now after the injection  Pain is a 5-6/10, he is still taking the Gabapentin  Please follow up   Patient is no seeing Dr Amanda Yoder

## 2021-09-24 NOTE — TELEPHONE ENCOUNTER
Please have him take gabapentin 300 mg b i d  for 3 days, then daily for 3 days, then stop this medication entirely  I sent topiramate to the pharmacy for him  While he is weaning from the gabapentin concurrently, please have him take 50 mg at night for 3 days, then b i d  for 3 days, then 50 mg in the morning and 100 mg at night for 3 days, then 100 mg b i d  thereafter

## 2021-09-24 NOTE — TELEPHONE ENCOUNTER
S/w pt  Advised of same  Reviewed se's with pt  Advised pt to CB with any concerns  Pt verbalized understanding and appreciation

## 2021-09-24 NOTE — TELEPHONE ENCOUNTER
RN s/w pt and OVS scheduled  Pt would like to know what to do for his neuropathic pain in the meantime  Should he wean off of the Gabapentin? If so, how should he do it? Can he be prescribed something to  help with the pain in his legs that has returned? Please advise

## 2021-09-24 NOTE — TELEPHONE ENCOUNTER
RN s/w pt  Pt reports that he has noticed an increase in feet swelling since Monday and at this time is unable to put on his shoes  He states that he has a similar reaction to Cymbalta  Pt is unsure if adding Naprosyn at the beginning of the week could have worsened the swelling  Pt advised that this could be a SE of the Gabapentin 300 mg tid that he is taking  Pt does not have pain in his feet while walking and has been elevating his feet when able  Pt also reports that his pain is coming back s/p OPRO on 9/3 with AS  Pt is  very frustrated and would like to discuss with AS  Pt is a transfer from [address] [place] [address] and saw AS for OPRO only  Does he need and OVS with AS or can he be seen by [facility]? Please advise

## 2021-10-08 NOTE — TELEPHONE ENCOUNTER
KEAGAN  S/w pt, he said since starting the Topamax 100 BID, he has noticed that he feels like there's ants crawling under his skin and he feels bloated/swollen all over  Pt said he only noticed this when he increased from 50 mg in the AM and 100 mg at HS to 100 mg BID  RN advised pt to go back down to the 50 mg in the AM and 100 mg at HS and see if the s/e improve, if so, stay on that dose, and if not call back  Pt verbalized understanding

## 2021-10-08 NOTE — TELEPHONE ENCOUNTER
Patient states he's having an allergic reaction from Topiramate medication such as facial skin sensations that ants underneath skin, bloatedness around his body   very uncomfortable  He would like to speak to a nurse about his status & their recommendations asap   Please advise, page    Call back# [phone]

## 2021-10-19 NOTE — TELEPHONE ENCOUNTER
Gabapentin sent to the pharmacy  Please have him take it QHS for 3 days, then BID for 3 days, then TID thereafter  If he cannot advance from QHS to BID or BID to TID, please have him take the most effective tolerated dose  Lilliana [facility] Nurse Msg Pool  Hello     The request for the venofer has been approved       Lilliana

## 2021-10-25 ENCOUNTER — TELEPHONE (OUTPATIENT)
Dept: PAIN MEDICINE | Facility: CLINIC | Age: 51
End: 2021-10-25

## 2021-10-25 ENCOUNTER — OFFICE VISIT (OUTPATIENT)
Dept: PAIN MEDICINE | Facility: CLINIC | Age: 51
End: 2021-10-25
Payer: COMMERCIAL

## 2021-10-25 VITALS
WEIGHT: 315 LBS | SYSTOLIC BLOOD PRESSURE: 119 MMHG | HEIGHT: 71 IN | BODY MASS INDEX: 44.1 KG/M2 | HEART RATE: 73 BPM | DIASTOLIC BLOOD PRESSURE: 79 MMHG

## 2021-10-25 DIAGNOSIS — G89.29 CHRONIC BILATERAL LOW BACK PAIN WITH BILATERAL SCIATICA: ICD-10-CM

## 2021-10-25 DIAGNOSIS — M51.36 LUMBAR DEGENERATIVE DISC DISEASE: ICD-10-CM

## 2021-10-25 DIAGNOSIS — M54.42 CHRONIC BILATERAL LOW BACK PAIN WITH BILATERAL SCIATICA: ICD-10-CM

## 2021-10-25 DIAGNOSIS — M54.16 LUMBAR RADICULOPATHY: ICD-10-CM

## 2021-10-25 DIAGNOSIS — G89.4 CHRONIC PAIN SYNDROME: Primary | ICD-10-CM

## 2021-10-25 DIAGNOSIS — M54.41 CHRONIC BILATERAL LOW BACK PAIN WITH BILATERAL SCIATICA: ICD-10-CM

## 2021-10-25 PROBLEM — M51.369 LUMBAR DEGENERATIVE DISC DISEASE: Status: ACTIVE | Noted: 2021-10-25

## 2021-10-25 PROCEDURE — 3078F DIAST BP <80 MM HG: CPT | Performed by: ANESTHESIOLOGY

## 2021-10-25 PROCEDURE — 1036F TOBACCO NON-USER: CPT | Performed by: ANESTHESIOLOGY

## 2021-10-25 PROCEDURE — 3074F SYST BP LT 130 MM HG: CPT | Performed by: ANESTHESIOLOGY

## 2021-10-25 PROCEDURE — 3008F BODY MASS INDEX DOCD: CPT | Performed by: ANESTHESIOLOGY

## 2021-10-25 PROCEDURE — 99214 OFFICE O/P EST MOD 30 MIN: CPT | Performed by: ANESTHESIOLOGY

## 2021-10-25 RX ORDER — TOPIRAMATE 50 MG/1
TABLET, FILM COATED ORAL
Qty: 90 TABLET | Refills: 1 | Status: SHIPPED | OUTPATIENT
Start: 2021-10-25 | End: 2022-01-18

## 2021-10-25 RX ORDER — NAPROXEN 500 MG/1
500 TABLET ORAL 2 TIMES DAILY PRN
Qty: 60 TABLET | Refills: 1 | Status: SHIPPED | OUTPATIENT
Start: 2021-10-25 | End: 2022-01-18

## 2021-11-15 ENCOUNTER — TELEPHONE (OUTPATIENT)
Dept: PAIN MEDICINE | Facility: CLINIC | Age: 51
End: 2021-11-15

## 2021-11-17 DIAGNOSIS — E78.5 HYPERLIPIDEMIA, UNSPECIFIED HYPERLIPIDEMIA TYPE: ICD-10-CM

## 2021-11-17 DIAGNOSIS — G89.4 CHRONIC PAIN SYNDROME: ICD-10-CM

## 2021-11-17 RX ORDER — ROSUVASTATIN CALCIUM 10 MG/1
10 TABLET, COATED ORAL DAILY
Qty: 90 TABLET | Refills: 1 | Status: SHIPPED | OUTPATIENT
Start: 2021-11-17 | End: 2022-02-20 | Stop reason: SDUPTHER

## 2021-11-24 ENCOUNTER — APPOINTMENT (OUTPATIENT)
Dept: RADIOLOGY | Facility: HOSPITAL | Age: 51
End: 2021-11-24
Payer: COMMERCIAL

## 2021-11-24 ENCOUNTER — HOSPITAL ENCOUNTER (OUTPATIENT)
Facility: AMBULARY SURGERY CENTER | Age: 51
Setting detail: OUTPATIENT SURGERY
Discharge: HOME | End: 2021-11-24
Attending: ANESTHESIOLOGY | Admitting: ANESTHESIOLOGY
Payer: COMMERCIAL

## 2021-11-24 VITALS
HEART RATE: 60 BPM | SYSTOLIC BLOOD PRESSURE: 124 MMHG | OXYGEN SATURATION: 100 % | RESPIRATION RATE: 18 BRPM | TEMPERATURE: 96.8 F | DIASTOLIC BLOOD PRESSURE: 91 MMHG

## 2021-11-24 PROCEDURE — 62323 NJX INTERLAMINAR LMBR/SAC: CPT | Performed by: ANESTHESIOLOGY

## 2021-11-24 PROCEDURE — 72020 X-RAY EXAM OF SPINE 1 VIEW: CPT

## 2021-11-24 RX ORDER — METHYLPREDNISOLONE ACETATE 80 MG/ML
INJECTION, SUSPENSION INTRA-ARTICULAR; INTRALESIONAL; INTRAMUSCULAR; SOFT TISSUE AS NEEDED
Status: DISCONTINUED | OUTPATIENT
Start: 2021-11-24 | End: 2021-11-24 | Stop reason: HOSPADM

## 2021-11-24 RX ORDER — LIDOCAINE WITH 8.4% SOD BICARB 0.9%(10ML)
SYRINGE (ML) INJECTION AS NEEDED
Status: DISCONTINUED | OUTPATIENT
Start: 2021-11-24 | End: 2021-11-24 | Stop reason: HOSPADM

## 2021-12-01 ENCOUNTER — TELEPHONE (OUTPATIENT)
Dept: PAIN MEDICINE | Facility: CLINIC | Age: 51
End: 2021-12-01

## 2021-12-01 NOTE — TELEPHONE ENCOUNTER
Pt reports 40% improvement post inj  Pain level 4/10  Pt aware I will call next week for an update

## 2021-12-02 ENCOUNTER — TELEPHONE (OUTPATIENT)
Dept: PAIN MEDICINE | Facility: CLINIC | Age: 51
End: 2021-12-02

## 2021-12-16 ENCOUNTER — OFFICE VISIT (OUTPATIENT)
Dept: FAMILY MEDICINE CLINIC | Facility: CLINIC | Age: 51
End: 2021-12-16
Payer: COMMERCIAL

## 2021-12-16 VITALS
HEIGHT: 71 IN | WEIGHT: 315 LBS | OXYGEN SATURATION: 96 % | BODY MASS INDEX: 44.1 KG/M2 | DIASTOLIC BLOOD PRESSURE: 82 MMHG | SYSTOLIC BLOOD PRESSURE: 113 MMHG | HEART RATE: 70 BPM | RESPIRATION RATE: 18 BRPM

## 2021-12-16 DIAGNOSIS — R35.0 URINARY FREQUENCY: Primary | ICD-10-CM

## 2021-12-16 PROCEDURE — 99214 OFFICE O/P EST MOD 30 MIN: CPT | Performed by: FAMILY MEDICINE

## 2021-12-23 PROCEDURE — U0003 INFECTIOUS AGENT DETECTION BY NUCLEIC ACID (DNA OR RNA); SEVERE ACUTE RESPIRATORY SYNDROME CORONAVIRUS 2 (SARS-COV-2) (CORONAVIRUS DISEASE [COVID-19]), AMPLIFIED PROBE TECHNIQUE, MAKING USE OF HIGH THROUGHPUT TECHNOLOGIES AS DESCRIBED BY CMS-2020-01-R: HCPCS | Performed by: FAMILY MEDICINE

## 2021-12-23 PROCEDURE — U0005 INFEC AGEN DETEC AMPLI PROBE: HCPCS | Performed by: FAMILY MEDICINE

## 2021-12-28 ENCOUNTER — IMMUNIZATIONS (OUTPATIENT)
Dept: FAMILY MEDICINE CLINIC | Facility: HOSPITAL | Age: 51
End: 2021-12-28

## 2021-12-28 DIAGNOSIS — Z23 ENCOUNTER FOR IMMUNIZATION: Primary | ICD-10-CM

## 2021-12-28 PROCEDURE — 91300 COVID-19 PFIZER VACC 0.3 ML: CPT

## 2021-12-28 PROCEDURE — 0001A COVID-19 PFIZER VACC 0.3 ML: CPT

## 2021-12-31 LAB — PSA SERPL-MCNC: 0.6 NG/ML (ref 0–4)

## 2022-01-03 ENCOUNTER — TELEPHONE (OUTPATIENT)
Dept: FAMILY MEDICINE CLINIC | Facility: CLINIC | Age: 52
End: 2022-01-03

## 2022-01-03 NOTE — TELEPHONE ENCOUNTER
----- Message from Maco Melchor MD sent at 1/3/2022  9:37 AM EST -----  Please call patient with normal results  Should fup with urology if symptoms are persisting

## 2022-01-05 NOTE — PROGRESS NOTES
Subjective:      Patient ID: Magui Grewal is a 44 y o  male  HPI   Patient is calling with concerns about snoring  Patient has history of hypertension  Blood pressure is controlled on hydrochlorothiazide 12 5 milligram, metoprolol XL 50 milligram   Also has hyperlipidemia,   Patient recently restarted Crestor  Is currently  is 43 5  Patient is trying to maintain a healthy lifestyle to achieve weight loss  Past Medical History:   Diagnosis Date    Disease of thyroid gland     Hypertension        Family History   Problem Relation Age of Onset    Emphysema Mother     Bone cancer Mother    [employer] Breast cancer Mother     Lung cancer Mother     Dementia Father     Stroke Father     Hyperlipidemia Father     Hypertension Father     Diabetes type II Father     Thyroid cancer Sister        Past Surgical History:   Procedure Laterality Date    NO PAST SURGERIES          reports that he has never smoked  He has never used smokeless tobacco  He reports current alcohol use  He reports that he does not use drugs        Current Outpatient Medications:     DULoxetine (CYMBALTA) 30 mg delayed release capsule, Take 1 capsule (30 mg total) by mouth daily, Disp: 30 capsule, Rfl: 1    hydrochlorothiazide (MICROZIDE) 12 5 mg capsule, Take 1 capsule by mouth once daily in the morning, Disp: 90 capsule, Rfl: 0    levothyroxine 100 mcg tablet, Take 1 tablet by mouth once daily, Disp: 90 tablet, Rfl: 0    metoprolol succinate (TOPROL-XL) 50 mg 24 hr tablet, Take 1 tablet by mouth once daily, Disp: 90 tablet, Rfl: 0    rosuvastatin (CRESTOR) 10 MG tablet, Take 1 tablet (10 mg total) by mouth daily, Disp: 90 tablet, Rfl: 1    ALPRAZolam (XANAX) 0 25 mg tablet, Take 1 tablet (0 25 mg total) by mouth daily at bedtime as needed for anxiety (Patient not taking: Reported on 7/1/2021), Disp: 30 tablet, Rfl: 0    The following portions of the patient's history were reviewed and updated as appropriate: allergies, current medications, past family history, past medical history, past social history, past surgical history and problem list     Review of Systems   Constitutional: Negative  Snoring   Respiratory: Negative  Negative for shortness of breath  Cardiovascular: Negative  Negative for chest pain and palpitations  Gastrointestinal: Negative  Endocrine: Negative  Genitourinary: Negative  Musculoskeletal: Negative  Negative for myalgias  Allergic/Immunologic: Negative  Neurological: Negative  Negative for headaches  Psychiatric/Behavioral: Negative  Objective:    Ht 5' 11" (1 803 m)   Wt (!) 142 kg (312 lb)   BMI 43 52 kg/m²      Physical Exam  Constitutional:       General: He is not in acute distress  Appearance: He is well-developed  HENT:      Mouth/Throat:      Pharynx: No oropharyngeal exudate  Pulmonary:      Effort: Pulmonary effort is normal  No respiratory distress  Neurological:      Mental Status: He is alert and oriented to person, place, and time  Psychiatric:         Behavior: Behavior normal          Thought Content: Thought content normal          Judgment: Judgment normal            Recent Results (from the past 1008 hour(s))   TSH, 3rd generation with Free T4 reflex    Collection Time: 05/24/21  9:43 AM   Result Value Ref Range    TSH 2 320 0 450 - 4 500 uIU/mL       Assessment/Plan:         Problem List Items Addressed This Visit        Respiratory    Sleep apnea - Primary     Patient has concerns with his snoring  Has history of hypertension, hyperlipidemia  BMI 43 52  Has been trying to lose weight  Will referred to sleep medicine           Relevant Orders    Ambulatory referral to Sleep Medicine       Cardiovascular and Mediastinum    Benign essential hypertension 96

## 2022-01-18 ENCOUNTER — TELEPHONE (OUTPATIENT)
Dept: PAIN MEDICINE | Facility: CLINIC | Age: 52
End: 2022-01-18

## 2022-01-18 DIAGNOSIS — M54.41 CHRONIC BILATERAL LOW BACK PAIN WITH BILATERAL SCIATICA: ICD-10-CM

## 2022-01-18 DIAGNOSIS — M54.42 CHRONIC BILATERAL LOW BACK PAIN WITH BILATERAL SCIATICA: ICD-10-CM

## 2022-01-18 DIAGNOSIS — G89.29 CHRONIC BILATERAL LOW BACK PAIN WITH BILATERAL SCIATICA: ICD-10-CM

## 2022-01-18 DIAGNOSIS — M54.16 LUMBAR RADICULOPATHY: Primary | ICD-10-CM

## 2022-01-18 DIAGNOSIS — G89.4 CHRONIC PAIN SYNDROME: ICD-10-CM

## 2022-01-18 RX ORDER — TOPIRAMATE 50 MG/1
TABLET, FILM COATED ORAL
Qty: 90 TABLET | Refills: 0 | Status: SHIPPED | OUTPATIENT
Start: 2022-01-18 | End: 2022-02-21 | Stop reason: SDUPTHER

## 2022-01-18 RX ORDER — NAPROXEN 500 MG/1
TABLET ORAL
Qty: 60 TABLET | Refills: 0 | Status: SHIPPED | OUTPATIENT
Start: 2022-01-18 | End: 2022-02-21 | Stop reason: SDUPTHER

## 2022-01-18 NOTE — TELEPHONE ENCOUNTER
AS pt- forward to on call     RN s/w pt  Pt is requesting refill of Naproxen 500 mg bid and Topamax 50 mg am, 100 mg hs  LP by AS 10/25/21 with 1 refill  Pt states that he is still having pain after OPRO 11/24  Pt denies se's  OVS scheduled with [facility] 2/21/22  Please advise

## 2022-01-18 NOTE — TELEPHONE ENCOUNTER
RN s/w pt requesting refills of Naproxen and Topamax, sent to on call  Pt states that he is having the same pain as he had before OPRO on 11/24  Pt denies recent injury  Pt reports relief lasted about 6 weeks  Pt had been taking Topamax 50 mg bid  Advised pt to begin taking as prescribed, 50 mg am and 100 mg hs  Pt advised that naproxen 500 mg bid is max dose  Pt also advised to add tylenol 1000 mg with a max of 3000 mg/ 24 hrs if not contraindicated  Pt advised to try ice and or heat  Pt is requesting another procedure and f/u with [facility] to discuss surgery possibility  OVS scheduled 2/21/22  Pt advised [facility] out of office until next week and that would be notified then of request  Pt appreciative of call

## 2022-01-18 NOTE — TELEPHONE ENCOUNTER
Pt contacted Call Center requested refill of their medication  1 ) Medication Name: naproxen (NAPROSYN    Dosage of Med:500 mg tablet     Frequency of Med: 2xs a day or as needed      Remaining Medication: 0      2 ) Medication Name: topiramate (Topamax    Dosage of Med:50 MG tablet     Frequency of Med: 2xs a day       Remaining Medication: 10      Pharmacy and Location:    [place], [place] [address]    Please reach out to patient  He would like to know if he can increase the naproxen (NAPROSYN to a 1000 mg a day? Pt also said that his pain is now coming back after his injections  His pain level is a 6-7  What should he do ?

## 2022-01-24 NOTE — TELEPHONE ENCOUNTER
S/W pt  Advised pt of the same  Gave pt neurosurgery's phone # to call  Pt verbalized understanding

## 2022-01-26 NOTE — TELEPHONE ENCOUNTER
Please get in touch with Swapnil Rodrigues at the [facility] office -- she has contact information for Dr Arsh Garcia and Dr Carly Trevino who are both in [place]

## 2022-01-26 NOTE — TELEPHONE ENCOUNTER
Pt is looking for a spine surgeon located in [place] due to his insurance  He wants a call today, his pain is very high so he also needs medication advice   # [phone]

## 2022-01-26 NOTE — TELEPHONE ENCOUNTER
S/w pt  Pt tried calling Dr Pugh's office and was advised Dr Shaw Brannon does not go to the [place] office anymore  RN confirmed same and per Dr Pugh's office there is no one at the [place] office now  Per pt he needs surgeon in [place] so that his insurance will cover his treatment  Pt would contact his insurance company but prefers a referral from Dr Praveena Patterson  Please advise- Is there another surgeon in  [place] that pt could see? Pt also had questions regarding Tylenol and how he should take it  Questions answered  Pt verbalized understanding

## 2022-01-26 NOTE — TELEPHONE ENCOUNTER
S/w pt  Contact Info provided for the following Drs- Pt appreciative      Dr Marte [facility]  [address]  [phone]    Dr Jiménez [facility]  [address] [address]  [phone]

## 2022-02-07 ENCOUNTER — TELEPHONE (OUTPATIENT)
Dept: PAIN MEDICINE | Facility: CLINIC | Age: 52
End: 2022-02-07

## 2022-02-07 NOTE — TELEPHONE ENCOUNTER
Spoke to pt, advised pt to go through [employer] or contact medical records  Pt verbalized understanding of instructions

## 2022-02-20 DIAGNOSIS — E78.5 HYPERLIPIDEMIA, UNSPECIFIED HYPERLIPIDEMIA TYPE: ICD-10-CM

## 2022-02-21 ENCOUNTER — TELEPHONE (OUTPATIENT)
Dept: PAIN MEDICINE | Facility: CLINIC | Age: 52
End: 2022-02-21

## 2022-02-21 ENCOUNTER — OFFICE VISIT (OUTPATIENT)
Dept: PAIN MEDICINE | Facility: CLINIC | Age: 52
End: 2022-02-21
Payer: COMMERCIAL

## 2022-02-21 ENCOUNTER — TELEPHONE (OUTPATIENT)
Dept: PAIN MEDICINE | Facility: MEDICAL CENTER | Age: 52
End: 2022-02-21

## 2022-02-21 VITALS
HEIGHT: 71 IN | DIASTOLIC BLOOD PRESSURE: 80 MMHG | SYSTOLIC BLOOD PRESSURE: 130 MMHG | WEIGHT: 315 LBS | HEART RATE: 70 BPM | BODY MASS INDEX: 44.1 KG/M2

## 2022-02-21 DIAGNOSIS — M54.42 CHRONIC BILATERAL LOW BACK PAIN WITH BILATERAL SCIATICA: ICD-10-CM

## 2022-02-21 DIAGNOSIS — G89.29 CHRONIC BILATERAL LOW BACK PAIN WITH BILATERAL SCIATICA: ICD-10-CM

## 2022-02-21 DIAGNOSIS — G89.4 CHRONIC PAIN SYNDROME: Primary | ICD-10-CM

## 2022-02-21 DIAGNOSIS — M54.41 CHRONIC BILATERAL LOW BACK PAIN WITH BILATERAL SCIATICA: ICD-10-CM

## 2022-02-21 DIAGNOSIS — M51.36 LUMBAR DEGENERATIVE DISC DISEASE: ICD-10-CM

## 2022-02-21 DIAGNOSIS — M54.16 LUMBAR RADICULOPATHY: ICD-10-CM

## 2022-02-21 PROCEDURE — 1036F TOBACCO NON-USER: CPT | Performed by: ANESTHESIOLOGY

## 2022-02-21 PROCEDURE — 3008F BODY MASS INDEX DOCD: CPT | Performed by: ANESTHESIOLOGY

## 2022-02-21 PROCEDURE — 3075F SYST BP GE 130 - 139MM HG: CPT | Performed by: ANESTHESIOLOGY

## 2022-02-21 PROCEDURE — 3079F DIAST BP 80-89 MM HG: CPT | Performed by: ANESTHESIOLOGY

## 2022-02-21 PROCEDURE — 99214 OFFICE O/P EST MOD 30 MIN: CPT | Performed by: ANESTHESIOLOGY

## 2022-02-21 RX ORDER — NAPROXEN 500 MG/1
500 TABLET ORAL 2 TIMES DAILY PRN
Qty: 60 TABLET | Refills: 2 | Status: SHIPPED | OUTPATIENT
Start: 2022-02-21

## 2022-02-21 RX ORDER — ROSUVASTATIN CALCIUM 10 MG/1
10 TABLET, COATED ORAL DAILY
Qty: 90 TABLET | Refills: 0 | Status: SHIPPED | OUTPATIENT
Start: 2022-02-21 | End: 2022-03-17 | Stop reason: SDUPTHER

## 2022-02-21 RX ORDER — TOPIRAMATE 100 MG/1
100 TABLET, FILM COATED ORAL 2 TIMES DAILY
Qty: 60 TABLET | Refills: 2 | Status: SHIPPED | OUTPATIENT
Start: 2022-02-21

## 2022-02-21 NOTE — PROGRESS NOTES
Pain Medicine Follow-Up Note    Assessment:  1  Chronic pain syndrome    2  Chronic bilateral low back pain with bilateral sciatica    3  Lumbar radiculopathy    4  Lumbar degenerative disc disease        Plan:  New Medications Ordered This Visit   Medications    naproxen (NAPROSYN) 500 mg tablet     Sig: Take 1 tablet (500 mg total) by mouth 2 (two) times a day as needed (pain (with food))     Dispense:  60 tablet     Refill:  2    topiramate (TOPAMAX) 100 mg tablet     Sig: Take 1 tablet (100 mg total) by mouth 2 (two) times a day TAKE 1 TABLET BY MOUTH IN THE MORNING AND 2 AT NIGHT     Dispense:  60 tablet     Refill:  2     My impressions and treatment recommendations were discussed in detail with the patient who verbalized understanding and had no further questions  The patient reports that he has already met with a neurosurgeon who suggested that he undergo a lumbar laminectomy and fusion  He will be seeking a second opinion regarding his lumbar spine pain later on this week  He did request a epidural steroid injection until he decides about proceeding with surgery  As such, I felt a reasonable to offer him a repeat L4-L5 lumbar epidural steroid injection since this could be potentially therapeutic  The procedures, its risks, and benefits were explained in detail to the patient  Risks include but are not limited to bleeding, infection, hematoma formation, abscess formation, weakness, headache, failure the pain to improve, nerve irritation or damage, and potential worsening of the pain  The patient verbalized understanding and wished to proceed with the procedure  I also felt a reasonable to continue the patient on naproxen 500 mg 1 tablet up to twice daily as needed for pain, with food  He is currently using topiramate 100 mg in the morning and 50 mg at night  I felt a reasonable to increase the topiramate to topiramate 100 mg twice daily    Side effects were reviewed with the patient  Follow-up is planned in 3 months time or sooner as warranted  Discharge instructions were provided  I personally saw and examined the patient and I agree with the above discussed plan of care  History of Present Illness:    Nilo Salmon is a 46 y o  male who presents to [facility] for interval re-evaluation of the above stated pain complaints  The patient has a past medical and chronic pain history as outlined in the assessment section  He was last seen on November 24, 2021 at which time he underwent a L4-L5 lumbar epidural steroid injection  At today's office visit, the patient's pain score is 7/10 on the verbal numerical pain rating scale  The patient states that his symptoms are primarily in the low back and bilateral lower extremities  He describes his pain as worse in the morning  His pain is intermittent in nature  He reports the quality of his pain as sharp, throbbing, pressure-like, shooting, and pins and needles  He is reporting 40% relief of symptoms with the combination of his medications  Other than as stated above, the patient denies any interval changes in medications, medical condition, mental condition, symptoms, or allergies since the last office visit  Review of Systems:    Review of Systems   Respiratory: Negative for shortness of breath  Cardiovascular: Negative for chest pain  Gastrointestinal: Negative for constipation, diarrhea, nausea and vomiting  Musculoskeletal: Positive for gait problem  Negative for arthralgias, joint swelling and myalgias  Skin: Negative for rash  Neurological: Negative for dizziness, seizures and weakness  All other systems reviewed and are negative          Patient Active Problem List   Diagnosis    Abnormal blood sugar    Benign essential hypertension    Dyslipidemia    Hypothyroidism    Class 3 severe obesity due to excess calories with serious comorbidity in adult [facility])    Prostate cancer screening    Anxiety    Chronic bilateral low back pain with bilateral sciatica    Sleep apnea    Chronic pain syndrome    Lumbar radiculopathy    Lumbar degenerative disc disease    Urinary frequency       Past Medical History:   Diagnosis Date    Disease of thyroid gland     Hypertension        Past Surgical History:   Procedure Laterality Date    EPIDURAL BLOCK INJECTION N/A 9/3/2021    Procedure: L4 L5 lumbar epidural steroid injection (92618); Surgeon: Subhash Ayers MD;  Location: [facility] MAIN OR;  Service: Pain Management     EPIDURAL BLOCK INJECTION N/A 11/24/2021    Procedure: L4 L5 lumbar epidural steroid injection ([id number]); Surgeon: Subhash Ayers MD;  Location: [facility] MAIN OR;  Service: Pain Management     NO PAST SURGERIES      CO INJECTION,SACROILIAC JOINT Bilateral 8/13/2021    Procedure: SACROILIAC JOINT INJECTION (97098); Surgeon: Subhash Ayers MD;  Location: [facility] MAIN OR;  Service: Pain Management        Family History   Problem Relation Age of Onset    Emphysema Mother     Bone cancer Mother    [facility] Breast cancer Mother     Lung cancer Mother    [facility] COPD Mother     Dementia Father     Stroke Father     Hyperlipidemia Father     Hypertension Father     Diabetes type II Father     Diabetes Father     Thyroid cancer Sister        Social History     Occupational History    Not on file   Tobacco Use    Smoking status: Never Smoker    Smokeless tobacco: Never Used    Tobacco comment: AT RISK FROM PASSIVE SMOKING    Vaping Use    Vaping Use: Never used   Substance and Sexual Activity    Alcohol use: Yes     Alcohol/week: 0 0 standard drinks     Comment: Social  Lay 1 or 2 drinks on the weekend      Drug use: No    Sexual activity: Not Currently         Current Outpatient Medications:     ALPRAZolam (XANAX) 0 25 mg tablet, Take 1 tablet (0 25 mg total) by mouth daily at bedtime as needed for anxiety, Disp: 30 tablet, Rfl: 0    hydrochlorothiazide (MICROZIDE) 12 5 mg capsule, Take 1 capsule (12 5 mg total) by mouth every morning, Disp: 90 capsule, Rfl: 1    levothyroxine (Euthyrox) 100 mcg tablet, Take 1 tablet (100 mcg total) by mouth daily, Disp: 90 tablet, Rfl: 1    metoprolol succinate (TOPROL-XL) 50 mg 24 hr tablet, Take 1 tablet (50 mg total) by mouth daily, Disp: 90 tablet, Rfl: 1    naproxen (NAPROSYN) 500 mg tablet, Take 1 tablet (500 mg total) by mouth 2 (two) times a day as needed (pain (with food)), Disp: 60 tablet, Rfl: 2    rosuvastatin (CRESTOR) 10 MG tablet, Take 1 tablet (10 mg total) by mouth daily, Disp: 90 tablet, Rfl: 0    topiramate (TOPAMAX) 100 mg tablet, Take 1 tablet (100 mg total) by mouth 2 (two) times a day TAKE 1 TABLET BY MOUTH IN THE MORNING AND 2 AT NIGHT, Disp: 60 tablet, Rfl: 2    No Known Allergies    Physical Exam:    /80   Pulse 70   Ht 5' 11" (1 803 m)   Wt (!) 148 kg (326 lb)   BMI 45 47 kg/m²     Constitutional:obese  Eyes:anicteric  HEENT:grossly intact  Neck:supple, symmetric, trachea midline and no masses   Pulmonary:even and unlabored  Cardiovascular:No edema or pitting edema present  Skin:Normal without rashes or lesions and well hydrated  Psychiatric:Mood and affect appropriate  Neurologic:Cranial Nerves II-XII grossly intact  Musculoskeletal:normal

## 2022-02-21 NOTE — H&P (VIEW-ONLY)
Pain Medicine Follow-Up Note    Assessment:  1  Chronic pain syndrome    2  Chronic bilateral low back pain with bilateral sciatica    3  Lumbar radiculopathy    4  Lumbar degenerative disc disease        Plan:  New Medications Ordered This Visit   Medications    naproxen (NAPROSYN) 500 mg tablet     Sig: Take 1 tablet (500 mg total) by mouth 2 (two) times a day as needed (pain (with food))     Dispense:  60 tablet     Refill:  2    topiramate (TOPAMAX) 100 mg tablet     Sig: Take 1 tablet (100 mg total) by mouth 2 (two) times a day TAKE 1 TABLET BY MOUTH IN THE MORNING AND 2 AT NIGHT     Dispense:  60 tablet     Refill:  2     My impressions and treatment recommendations were discussed in detail with the patient who verbalized understanding and had no further questions  The patient reports that he has already met with a neurosurgeon who suggested that he undergo a lumbar laminectomy and fusion  He will be seeking a second opinion regarding his lumbar spine pain later on this week  He did request a epidural steroid injection until he decides about proceeding with surgery  As such, I felt a reasonable to offer him a repeat L4-L5 lumbar epidural steroid injection since this could be potentially therapeutic  The procedures, its risks, and benefits were explained in detail to the patient  Risks include but are not limited to bleeding, infection, hematoma formation, abscess formation, weakness, headache, failure the pain to improve, nerve irritation or damage, and potential worsening of the pain  The patient verbalized understanding and wished to proceed with the procedure  I also felt a reasonable to continue the patient on naproxen 500 mg 1 tablet up to twice daily as needed for pain, with food  He is currently using topiramate 100 mg in the morning and 50 mg at night  I felt a reasonable to increase the topiramate to topiramate 100 mg twice daily    Side effects were reviewed with the patient  Follow-up is planned in 3 months time or sooner as warranted  Discharge instructions were provided  I personally saw and examined the patient and I agree with the above discussed plan of care  History of Present Illness:    Valorie Rivers is a 46 y o  male who presents to [facility] for interval re-evaluation of the above stated pain complaints  The patient has a past medical and chronic pain history as outlined in the assessment section  He was last seen on November 24, 2021 at which time he underwent a L4-L5 lumbar epidural steroid injection  At today's office visit, the patient's pain score is 7/10 on the verbal numerical pain rating scale  The patient states that his symptoms are primarily in the low back and bilateral lower extremities  He describes his pain as worse in the morning  His pain is intermittent in nature  He reports the quality of his pain as sharp, throbbing, pressure-like, shooting, and pins and needles  He is reporting 40% relief of symptoms with the combination of his medications  Other than as stated above, the patient denies any interval changes in medications, medical condition, mental condition, symptoms, or allergies since the last office visit  Review of Systems:    Review of Systems   Respiratory: Negative for shortness of breath  Cardiovascular: Negative for chest pain  Gastrointestinal: Negative for constipation, diarrhea, nausea and vomiting  Musculoskeletal: Positive for gait problem  Negative for arthralgias, joint swelling and myalgias  Skin: Negative for rash  Neurological: Negative for dizziness, seizures and weakness  All other systems reviewed and are negative          Patient Active Problem List   Diagnosis    Abnormal blood sugar    Benign essential hypertension    Dyslipidemia    Hypothyroidism    Class 3 severe obesity due to excess calories with serious comorbidity in adult [facility])    Prostate cancer screening    Anxiety    Chronic bilateral low back pain with bilateral sciatica    Sleep apnea    Chronic pain syndrome    Lumbar radiculopathy    Lumbar degenerative disc disease    Urinary frequency       Past Medical History:   Diagnosis Date    Disease of thyroid gland     Hypertension        Past Surgical History:   Procedure Laterality Date    EPIDURAL BLOCK INJECTION N/A 9/3/2021    Procedure: L4 L5 lumbar epidural steroid injection (80403); Surgeon: Annika Berumen MD;  Location: [facility] MAIN OR;  Service: Pain Management     EPIDURAL BLOCK INJECTION N/A 11/24/2021    Procedure: L4 L5 lumbar epidural steroid injection ([id number]); Surgeon: Annika Berumen MD;  Location: [facility] MAIN OR;  Service: Pain Management     NO PAST SURGERIES      ID INJECTION,SACROILIAC JOINT Bilateral 8/13/2021    Procedure: SACROILIAC JOINT INJECTION ([id number]); Surgeon: Annika Berumen MD;  Location: [facility] MAIN OR;  Service: Pain Management        Family History   Problem Relation Age of Onset    Emphysema Mother     Bone cancer Mother    [facility] Breast cancer Mother     Lung cancer Mother    [facility] COPD Mother     Dementia Father     Stroke Father     Hyperlipidemia Father     Hypertension Father     Diabetes type II Father     Diabetes Father     Thyroid cancer Sister        Social History     Occupational History    Not on file   Tobacco Use    Smoking status: Never Smoker    Smokeless tobacco: Never Used    Tobacco comment: AT RISK FROM PASSIVE SMOKING    Vaping Use    Vaping Use: Never used   Substance and Sexual Activity    Alcohol use: Yes     Alcohol/week: 0 0 standard drinks     Comment: Social  Lay 1 or 2 drinks on the weekend      Drug use: No    Sexual activity: Not Currently         Current Outpatient Medications:     ALPRAZolam (XANAX) 0 25 mg tablet, Take 1 tablet (0 25 mg total) by mouth daily at bedtime as needed for anxiety, Disp: 30 tablet, Rfl: 0    hydrochlorothiazide (MICROZIDE) 12 5 mg capsule, Take 1 capsule (12 5 mg total) by mouth every morning, Disp: 90 capsule, Rfl: 1    levothyroxine (Euthyrox) 100 mcg tablet, Take 1 tablet (100 mcg total) by mouth daily, Disp: 90 tablet, Rfl: 1    metoprolol succinate (TOPROL-XL) 50 mg 24 hr tablet, Take 1 tablet (50 mg total) by mouth daily, Disp: 90 tablet, Rfl: 1    naproxen (NAPROSYN) 500 mg tablet, Take 1 tablet (500 mg total) by mouth 2 (two) times a day as needed (pain (with food)), Disp: 60 tablet, Rfl: 2    rosuvastatin (CRESTOR) 10 MG tablet, Take 1 tablet (10 mg total) by mouth daily, Disp: 90 tablet, Rfl: 0    topiramate (TOPAMAX) 100 mg tablet, Take 1 tablet (100 mg total) by mouth 2 (two) times a day TAKE 1 TABLET BY MOUTH IN THE MORNING AND 2 AT NIGHT, Disp: 60 tablet, Rfl: 2    No Known Allergies    Physical Exam:    /80   Pulse 70   Ht 5' 11" (1 803 m)   Wt (!) 148 kg (326 lb)   BMI 45 47 kg/m²     Constitutional:obese  Eyes:anicteric  HEENT:grossly intact  Neck:supple, symmetric, trachea midline and no masses   Pulmonary:even and unlabored  Cardiovascular:No edema or pitting edema present  Skin:Normal without rashes or lesions and well hydrated  Psychiatric:Mood and affect appropriate  Neurologic:Cranial Nerves II-XII grossly intact  Musculoskeletal:normal

## 2022-02-21 NOTE — TELEPHONE ENCOUNTER
Scheduled patient for 3/18/22  Patient denies RX blood thinners/ NSAIDS  Nothing to eat or drink 1 hour prior to procedure  Needs to arrange transportation  Proper clothing for procedure  No vaccines 2 weeks prior or after procedure  If ill or place on antibiotics, please call to reschedule

## 2022-02-21 NOTE — TELEPHONE ENCOUNTER
S/w pt who is is unsure of prescribed dose  S/w Corinne at [facility] DR THERON SIMPSON and advised that as per [facility] note from today, pt is to take Topiramate 100 mg bid  S/w pt and advised of correct dose  Pt appreciative of call

## 2022-02-21 NOTE — TELEPHONE ENCOUNTER
[facility] called stating that they need a clarification on the directions for the topiramate    Pt can be reached at [phone]

## 2022-03-02 ENCOUNTER — TELEPHONE (OUTPATIENT)
Dept: PAIN MEDICINE | Facility: MEDICAL CENTER | Age: 52
End: 2022-03-02

## 2022-03-02 NOTE — TELEPHONE ENCOUNTER
S/w pt, he is getting surgery with Dr Michael Hackett, but prior to getting the surgery they are requesting the pt's [facility] records  Pt is requesting his records be faxed to Dr Tala Austin office, at fax # [phone]

## 2022-03-04 ENCOUNTER — APPOINTMENT (OUTPATIENT)
Dept: RADIOLOGY | Facility: HOSPITAL | Age: 52
End: 2022-03-04
Payer: COMMERCIAL

## 2022-03-04 ENCOUNTER — HOSPITAL ENCOUNTER (OUTPATIENT)
Facility: AMBULARY SURGERY CENTER | Age: 52
Setting detail: OUTPATIENT SURGERY
Discharge: HOME | End: 2022-03-04
Attending: ANESTHESIOLOGY | Admitting: ANESTHESIOLOGY
Payer: COMMERCIAL

## 2022-03-04 VITALS
DIASTOLIC BLOOD PRESSURE: 89 MMHG | HEART RATE: 74 BPM | SYSTOLIC BLOOD PRESSURE: 138 MMHG | TEMPERATURE: 97.3 F | OXYGEN SATURATION: 98 % | RESPIRATION RATE: 16 BRPM

## 2022-03-04 PROCEDURE — 62323 NJX INTERLAMINAR LMBR/SAC: CPT | Performed by: ANESTHESIOLOGY

## 2022-03-04 PROCEDURE — 72020 X-RAY EXAM OF SPINE 1 VIEW: CPT

## 2022-03-04 RX ORDER — SODIUM CHLORIDE 9 MG/ML
INJECTION INTRAVENOUS AS NEEDED
Status: DISCONTINUED | OUTPATIENT
Start: 2022-03-04 | End: 2022-03-04 | Stop reason: HOSPADM

## 2022-03-04 RX ORDER — LIDOCAINE WITH 8.4% SOD BICARB 0.9%(10ML)
SYRINGE (ML) INJECTION AS NEEDED
Status: DISCONTINUED | OUTPATIENT
Start: 2022-03-04 | End: 2022-03-04 | Stop reason: HOSPADM

## 2022-03-04 RX ORDER — METHYLPREDNISOLONE ACETATE 80 MG/ML
INJECTION, SUSPENSION INTRA-ARTICULAR; INTRALESIONAL; INTRAMUSCULAR; SOFT TISSUE AS NEEDED
Status: DISCONTINUED | OUTPATIENT
Start: 2022-03-04 | End: 2022-03-04 | Stop reason: HOSPADM

## 2022-03-04 NOTE — DISCHARGE INSTRUCTIONS
Epidural Steroid Injection   WHAT YOU NEED TO KNOW:   An epidural steroid injection (SUZAN) is a procedure to inject steroid medicine into the epidural space  The epidural space is between your spinal cord and vertebrae  Steroids reduce inflammation and fluid buildup in your spine that may be causing pain  You may be given pain medicine along with the steroids  ACTIVITY  · Do not drive or operate machinery today  · No strenuous activity today - bending, lifting, etc   · You may resume normal activites starting tomorrow - start slowly and as tolerated  · You may shower today, but no tub baths or hot tubs  · You may have numbness for several hours from the local anesthetic  Please use caution and common sense, especially with weight-bearing activities  CARE OF THE INJECTION SITE  · If you have soreness or pain, apply ice to the area today (20 minutes on/20 minutes off)  · Starting tomorrow, you may use warm, moist heat or ice if needed  · You may have an increase or change in your discomfort for 36-48 hours after your treatment  · Apply ice and continue with any pain medication you have been prescribed  · Notify [facility] if you have any of the following: redness, drainage, swelling, headache, stiff neck or fever above 100°F     SPECIAL INSTRUCTIONS  · Our office will contact you in approximately 7 days for a progress report  MEDICATIONS  · Continue to take all routine medications  · Our office may have instructed you to hold some medications  As no general anesthesia was used in today's procedure, you should not experience any side effects related to anesthesia  If you have a problem specifically related to your procedure, please call our office at [phone]  Problems not related to your procedure should be directed to your primary care physician

## 2022-03-04 NOTE — OP NOTE
ATTENDING PHYSICIAN:  Kaelyn Snowden MD     PROCEDURE:  Lumbar interlaminar midline epidural steroid injection with steroid and local anesthetic under fluoroscopy at the L4-L5 level  PREPROCEDURE DIAGNOSIS:  Low back pain  POSTPROCEDURE DIAGNOSIS:  Low back pain  ANESTHESIA:  Local     ESTIMATED BLOOD LOSS:  Minimal     COMPLICATIONS:  None  LOCATION:  [facility]  CONSENT:  Today's procedure, its potential benefits as well as its risks and potential side effects were reviewed  Discussed risks of the procedure including bleeding, infection, nerve irritation or damage, reactions to the medications, headache, failure of the pain to improve, and potential worsening of the pain were explained to the patient who verbalized understanding and who wished to proceed  Written informed consent was thereby obtained  DESCRIPTION OF THE PROCEDURE:  After written informed consent was obtained, the patient was taken to the fluoroscopy suite and placed in the prone position  Anatomical landmarks were identified by way of fluoroscopy in multiple views  The skin of the lumbar region was prepped and draped in the usual sterile fashion  Strict aseptic technique was utilized  The skin and subcutaneous tissues at the needle entry site were infiltrated with 3 mL of 1% preservative-free lidocaine using a 25-gauge 1-1/2-inch needle  A 20-gauge Tuohy needle was then incrementally advanced under fluoroscopy using a loss of resistance technique  Upon entering into the epidural space, a positive loss of resistance to air was noted and a characteristic "pop" was felt  Proper placement into the epidural space was confirmed with fluoroscopy in multiple views and by continued loss of resistance after injection of 1 mL of sterile preservative-free normal saline as well as the administration of contrast to delineate the epidural space  There were no paresthesias reported   After negative aspiration for CSF or heme, a 6 mL injectate consisting of 1 mL of Depo-Medrol 80 mg/mL mixed with 5 mL of preservative-free normal saline was slowly injected  The patient tolerated the procedure well and all needles were removed with the tips intact  Hemostasis was maintained  There were no apparent paresthesias or complications  The skin was wiped clean and a Band-Aid was placed as appropriate  The patient was monitored for an appropriate period of time following the procedure and remained hemodynamically stable and neurovascularly intact following the procedure  The patient was ultimately discharged to home with supervision in good condition and instructed to call the office in a few days for an update or sooner as warranted  I was present and participated in all key and critical portions of this procedure      Ailyn Marcelo MD  3/4/2022  3:38 PM

## 2022-03-04 NOTE — INTERVAL H&P NOTE
H&P reviewed  After examining the patient I find no changes in the patients condition since the H&P had been written      Vitals:    03/04/22 1451   BP: 123/88   Pulse: 62   Resp: 16   Temp: (!) 97 3 °F (36 3 °C)   SpO2: 98%

## 2022-03-10 ENCOUNTER — RA CDI HCC (OUTPATIENT)
Dept: OTHER | Facility: HOSPITAL | Age: 52
End: 2022-03-10

## 2022-03-10 NOTE — PROGRESS NOTES
[place] [address]  coding opportunities             Chart Reviewed * (Number of) Quinn suggestions sent to Provider: 1   E66 01 Class 3 severe obesity due to excess calories with serious comorbidity in adult ([place] [address] )  *BMI>40        If this is correct, please document and assess at your next visit,3/16/2022               Patients insurance company: [address] [place] [address] (Medicare and Commercial for [employer] and [facility])

## 2022-03-11 ENCOUNTER — TELEPHONE (OUTPATIENT)
Dept: PAIN MEDICINE | Facility: MEDICAL CENTER | Age: 52
End: 2022-03-11

## 2022-03-15 RX ORDER — ALFUZOSIN HYDROCHLORIDE 10 MG/1
10 TABLET, EXTENDED RELEASE ORAL DAILY
COMMUNITY
Start: 2022-02-17

## 2022-03-15 RX ORDER — TADALAFIL 5 MG/1
5 TABLET ORAL
COMMUNITY
Start: 2022-02-17

## 2022-03-17 ENCOUNTER — OFFICE VISIT (OUTPATIENT)
Dept: FAMILY MEDICINE CLINIC | Facility: CLINIC | Age: 52
End: 2022-03-17
Payer: COMMERCIAL

## 2022-03-17 VITALS
SYSTOLIC BLOOD PRESSURE: 120 MMHG | HEIGHT: 71 IN | HEART RATE: 78 BPM | BODY MASS INDEX: 44.1 KG/M2 | OXYGEN SATURATION: 98 % | DIASTOLIC BLOOD PRESSURE: 82 MMHG | WEIGHT: 315 LBS

## 2022-03-17 DIAGNOSIS — E03.9 HYPOTHYROIDISM, UNSPECIFIED TYPE: ICD-10-CM

## 2022-03-17 DIAGNOSIS — Z01.810 PREOPERATIVE CARDIOVASCULAR EXAMINATION: Primary | ICD-10-CM

## 2022-03-17 DIAGNOSIS — M54.16 LUMBAR RADICULOPATHY: ICD-10-CM

## 2022-03-17 DIAGNOSIS — I10 BENIGN ESSENTIAL HYPERTENSION: ICD-10-CM

## 2022-03-17 DIAGNOSIS — E78.5 HYPERLIPIDEMIA, UNSPECIFIED HYPERLIPIDEMIA TYPE: ICD-10-CM

## 2022-03-17 PROBLEM — R35.0 URINARY FREQUENCY: Status: RESOLVED | Noted: 2021-12-16 | Resolved: 2022-03-17

## 2022-03-17 PROBLEM — F41.9 ANXIETY: Status: RESOLVED | Noted: 2020-07-23 | Resolved: 2022-03-17

## 2022-03-17 PROCEDURE — 3074F SYST BP LT 130 MM HG: CPT | Performed by: FAMILY MEDICINE

## 2022-03-17 PROCEDURE — 3079F DIAST BP 80-89 MM HG: CPT | Performed by: FAMILY MEDICINE

## 2022-03-17 PROCEDURE — 3008F BODY MASS INDEX DOCD: CPT | Performed by: FAMILY MEDICINE

## 2022-03-17 PROCEDURE — 1036F TOBACCO NON-USER: CPT | Performed by: FAMILY MEDICINE

## 2022-03-17 PROCEDURE — 93000 ELECTROCARDIOGRAM COMPLETE: CPT | Performed by: FAMILY MEDICINE

## 2022-03-17 PROCEDURE — 3725F SCREEN DEPRESSION PERFORMED: CPT | Performed by: FAMILY MEDICINE

## 2022-03-17 PROCEDURE — 99214 OFFICE O/P EST MOD 30 MIN: CPT | Performed by: FAMILY MEDICINE

## 2022-03-17 RX ORDER — METOPROLOL SUCCINATE 50 MG/1
50 TABLET, EXTENDED RELEASE ORAL DAILY
Qty: 90 TABLET | Refills: 1 | Status: SHIPPED | OUTPATIENT
Start: 2022-03-17

## 2022-03-17 RX ORDER — ROSUVASTATIN CALCIUM 10 MG/1
10 TABLET, COATED ORAL DAILY
Qty: 90 TABLET | Refills: 1 | Status: SHIPPED | OUTPATIENT
Start: 2022-03-17

## 2022-03-17 RX ORDER — HYDROCHLOROTHIAZIDE 12.5 MG/1
12.5 CAPSULE, GELATIN COATED ORAL EVERY MORNING
Qty: 90 CAPSULE | Refills: 1 | Status: SHIPPED | OUTPATIENT
Start: 2022-03-17

## 2022-03-17 RX ORDER — LEVOTHYROXINE SODIUM 0.1 MG/1
100 TABLET ORAL DAILY
Qty: 90 TABLET | Refills: 1 | Status: SHIPPED | OUTPATIENT
Start: 2022-03-17

## 2022-03-17 NOTE — ASSESSMENT & PLAN NOTE
PATIENT IS AT  MILD CARDIOVASCULAR RISK FOR THE SURGERY  ADVISED RE-EVALUATION IF HE DEVELOPS ANY SYMPTOMS OF CHEST PAIN OR SHORTNESS OF BREATH PRIOR TO SURGERY  23

## 2022-03-17 NOTE — PROGRESS NOTES
Subjective:      Patient ID: Stephanie Hanks is a 46 y o  male  Chief Complaint   Patient presents with    Pre-op Exam       HPI    Patient is here for preop clearance As mentioned below  Patient has history of hypertension  His blood pressure is at goal today  He is currently on metoprolol and hydrochlorothiazide  Has hyperlipidemia  LDL stable, on Crestor 10 milligram   Has hypothyroidism, TSH stable on levothyroxine 100 mcg  Patient denies any symptoms of chest pain or shortness of breath  The following portions of the patient's history were reviewed and updated as appropriate: allergies, current medications, past family history, past medical history, past social history, past surgical history and problem list     Procedure date: 3/28/22    Surgeon:  Dr Magnolia Montelongo    Planned procedure:  L4-5 Laminectomy    Diagnosis for procedure:  Low back pain    Prior anesthesia: yes        Problem with anesthesia: no    CAD History: no    INDY history:No    Exercise Capacity:  · Able to walk 4 blocks without symptoms?: Yes  · Able to walk 2 flights without symptoms?: Yes        Review of Systems   Constitutional: Negative  HENT: Negative  Eyes: Negative  Respiratory: Negative  Cardiovascular: Negative  Gastrointestinal: Negative  Endocrine: Negative  Genitourinary: Negative  Musculoskeletal: Negative  Skin: Negative  Neurological: Negative  Psychiatric/Behavioral: Negative            Current Outpatient Medications   Medication Sig Dispense Refill    alfuzosin (UROXATRAL) 10 mg 24 hr tablet Take 10 mg by mouth daily      ALPRAZolam (XANAX) 0 25 mg tablet Take 1 tablet (0 25 mg total) by mouth daily at bedtime as needed for anxiety 30 tablet 0    hydrochlorothiazide (MICROZIDE) 12 5 mg capsule Take 1 capsule (12 5 mg total) by mouth every morning 90 capsule 1    levothyroxine (Euthyrox) 100 mcg tablet Take 1 tablet (100 mcg total) by mouth daily 90 tablet 1    metoprolol succinate (TOPROL-XL) 50 mg 24 hr tablet Take 1 tablet (50 mg total) by mouth daily 90 tablet 1    naproxen (NAPROSYN) 500 mg tablet Take 1 tablet (500 mg total) by mouth 2 (two) times a day as needed (pain (with food)) 60 tablet 2    rosuvastatin (CRESTOR) 10 MG tablet Take 1 tablet (10 mg total) by mouth daily 90 tablet 1    tadalafil (CIALIS) 5 MG tablet Take 5 mg by mouth      topiramate (TOPAMAX) 100 mg tablet Take 1 tablet (100 mg total) by mouth 2 (two) times a day TAKE 1 TABLET BY MOUTH IN THE MORNING AND 2 AT NIGHT 60 tablet 2     No current facility-administered medications for this visit  Patient has no known allergies  Past Medical History:   Diagnosis Date    Disease of thyroid gland     Hypertension        Past Surgical History:   Procedure Laterality Date    EPIDURAL BLOCK INJECTION N/A 9/3/2021    Procedure: L4 L5 lumbar epidural steroid injection (93000); Surgeon: Dre Bell MD;  Location: [facility] MAIN OR;  Service: Pain Management     EPIDURAL BLOCK INJECTION N/A 11/24/2021    Procedure: L4 L5 lumbar epidural steroid injection (72752); Surgeon: Dre Bell MD;  Location: [facility] MAIN OR;  Service: Pain Management     EPIDURAL BLOCK INJECTION N/A 3/4/2022    Procedure: L4 L5 lumbar epidural steroid injection (50343); Surgeon: Dre Bell MD;  Location: [facility] MAIN OR;  Service: Pain Management     NO PAST SURGERIES      NV INJECTION,SACROILIAC JOINT Bilateral 8/13/2021    Procedure: SACROILIAC JOINT INJECTION (81869);   Surgeon: Dre Bell MD;  Location: [facility] MAIN OR;  Service: Pain Management        Family History   Problem Relation Age of Onset    Emphysema Mother     Bone cancer Mother    Carolina Keturah Breast cancer Mother     Lung cancer Mother    Carolina Keturah COPD Mother    Wai Keturah Dementia Father     Stroke Father     Hyperlipidemia Father     Hypertension Father     Diabetes type II Father     Diabetes Father     Thyroid cancer Sister        Social History     Tobacco Use    Smoking status: Never Smoker    Smokeless tobacco: Never Used    Tobacco comment: AT RISK FROM PASSIVE SMOKING    Vaping Use    Vaping Use: Never used   Substance Use Topics    Alcohol use: Yes     Alcohol/week: 0 0 standard drinks     Comment: Social  Lay 1 or 2 drinks on the weekend   Drug use: No       Objective:    Vitals:    03/17/22 0810   BP: 120/82   Pulse: 78   SpO2: 98%   Weight: (!) 147 kg (323 lb)   Height: 5' 11" (1 803 m)        Physical Exam  Constitutional:       Appearance: He is well-developed  HENT:      Mouth/Throat:      Pharynx: No oropharyngeal exudate  Eyes:      Pupils: Pupils are equal, round, and reactive to light  Cardiovascular:      Rate and Rhythm: Normal rate and regular rhythm  Pulmonary:      Effort: Pulmonary effort is normal       Breath sounds: Normal breath sounds  Abdominal:      General: Bowel sounds are normal       Palpations: Abdomen is soft  Musculoskeletal:         General: Normal range of motion  Cervical back: Normal range of motion  Neurological:      Mental Status: He is alert and oriented to person, place, and time  Psychiatric:         Behavior: Behavior normal          Judgment: Judgment normal            Preop labs/testing available and reviewed: yes    Laboratory Results: I have personally reviewed the pertinent laboratory results/reports     EKG: I have personally reviewed pertinent reports  Chest x-ray: Normal        Assessment/Plan:    Patient is cleared for planned procedure  Further testing/evaluation is not required      Postop concerns: no         Problem List Items Addressed This Visit        Endocrine    Hypothyroidism     TSH is at goal   Continue levothyroxine 100 micrograms         Relevant Medications    levothyroxine (Euthyrox) 100 mcg tablet    metoprolol succinate (TOPROL-XL) 50 mg 24 hr tablet       Cardiovascular and Mediastinum    Benign essential hypertension     The patient's blood pressure is at goal   Continue hydrochlorothiazide 12 5, metoprolol XL 50 milligram          Relevant Medications    hydrochlorothiazide (MICROZIDE) 12 5 mg capsule    metoprolol succinate (TOPROL-XL) 50 mg 24 hr tablet       Nervous and Auditory    Lumbar radiculopathy     Patient scheduled for L3-4 sravan laminectomy  Other    Hyperlipidemia     LDL at goal  Continue crestor 10 mg daily  Patient due for metabolic labs  Relevant Medications    rosuvastatin (CRESTOR) 10 MG tablet    Preoperative cardiovascular examination - Primary     PATIENT IS AT  MILD CARDIOVASCULAR RISK FOR THE SURGERY  ADVISED RE-EVALUATION IF HE DEVELOPS ANY SYMPTOMS OF CHEST PAIN OR SHORTNESS OF BREATH PRIOR TO SURGERY  Relevant Orders    POCT ECG (Completed)            BMI Counseling: Body mass index is 45 05 kg/m²  The BMI is above normal  Nutrition recommendations include reducing portion sizes, decreasing overall calorie intake, 3-5 servings of fruits/vegetables daily, reducing fast food intake, consuming healthier snacks and decreasing soda and/or juice intake  Exercise recommendations include moderate aerobic physical activity for 150 minutes/week

## 2022-03-30 ENCOUNTER — TELEPHONE (OUTPATIENT)
Dept: FAMILY MEDICINE CLINIC | Facility: CLINIC | Age: 52
End: 2022-03-30

## 2022-03-30 NOTE — TELEPHONE ENCOUNTER
Patient called and stated that he had surgery 3 days ago and stopped taking hydrochlorothiazide 2 days before surgery  He read an article that Fermin Oh has recalled the medication  He is not comfortable restarting the medication and would like to know if there is something else that can be prescribed in it's place

## 2022-03-31 NOTE — TELEPHONE ENCOUNTER
Please get his BP value  If bp is above 140/90 then He can increase dose of metoprolol by taking 2 tablets together

## 2022-05-31 ENCOUNTER — TELEPHONE (OUTPATIENT)
Dept: FAMILY MEDICINE CLINIC | Facility: CLINIC | Age: 52
End: 2022-05-31

## 2022-05-31 NOTE — TELEPHONE ENCOUNTER
Patient called and stated he was initially taking hydrochlorothiazide with metoprolol, but saw a report from [facility]in Oh stating it could cause cancer  After speaking with a nurse, he discontinued the medication and was told to monitor his blood pressure  His blood pressure has been good  He stated he recently saw that acupril was the medication that causes cancer  He has started to get headaches  He has been under stress and recovering from back surgery on 3/28, for which he is still staking tylenol and advil  He would like to know if it is safe to start taking hydrochlorothiazide again

## 2022-06-06 NOTE — TELEPHONE ENCOUNTER
Please send my chart message, so he can call and schedule a fup/ virtual if there are medication questions

## 2022-07-17 ENCOUNTER — TELEPHONE (OUTPATIENT)
Dept: FAMILY MEDICINE CLINIC | Facility: CLINIC | Age: 52
End: 2022-07-17

## 2022-07-17 ENCOUNTER — NURSE TRIAGE (OUTPATIENT)
Dept: OTHER | Facility: OTHER | Age: 52
End: 2022-07-17

## 2022-07-17 DIAGNOSIS — R50.9 FEVER AND CHILLS: ICD-10-CM

## 2022-07-17 DIAGNOSIS — R09.81 NASAL CONGESTION: ICD-10-CM

## 2022-07-17 DIAGNOSIS — J02.9 SORE THROAT: ICD-10-CM

## 2022-07-17 DIAGNOSIS — U07.1 COVID-19: ICD-10-CM

## 2022-07-17 DIAGNOSIS — R05.9 COUGH: Primary | ICD-10-CM

## 2022-07-17 RX ORDER — BENZONATATE 100 MG/1
100 CAPSULE ORAL 3 TIMES DAILY PRN
Qty: 20 CAPSULE | Refills: 0 | Status: SHIPPED | OUTPATIENT
Start: 2022-07-17

## 2022-07-17 RX ORDER — GUAIFENESIN 600 MG/1
1200 TABLET, EXTENDED RELEASE ORAL EVERY 12 HOURS SCHEDULED
Qty: 60 TABLET | Refills: 0 | Status: SHIPPED | OUTPATIENT
Start: 2022-07-17

## 2022-07-17 NOTE — TELEPHONE ENCOUNTER
Regarding: covid positive  ----- Message from [facility] sent at 7/17/2022 10:11 AM EDT -----  "I have covid, is there medication I can take"

## 2022-07-17 NOTE — TELEPHONE ENCOUNTER
Reason for Disposition   HIGH RISK for severe COVID complications (e g , weak immune system, age > 59 years, obesity with BMI > 22, pregnant, chronic lung disease or other chronic medical condition)  (Exception: Already seen by PCP and no new or worsening symptoms )    Answer Assessment - Initial Assessment Questions  1  COVID-19 DIAGNOSIS: "Who made your COVID-19 diagnosis?" "Was it confirmed by a positive lab test or self-test?" If not diagnosed by a doctor (or NP/PA), ask "Are there lots of cases (community spread) where you live?" Note: See public health department website, if unsure  Home test positive 7/15    2  COVID-19 EXPOSURE: "Was there any known exposure to COVID before the symptoms began?" CDC Definition of close contact: within 6 feet (2 meters) for a total of 15 minutes or more over a 24-hour period  Unknown    3  ONSET: "When did the COVID-19 symptoms start?"       7/14    4  WORST SYMPTOM: "What is your worst symptom?" (e g , cough, fever, shortness of breath, muscle aches)      Congestion    5  COUGH: "Do you have a cough?" If Yes, ask: "How bad is the cough?"        Non productive cough, frequent    6  FEVER: "Do you have a fever?" If Yes, ask: "What is your temperature, how was it measured, and when did it start?"      100 6 oral     7  RESPIRATORY STATUS: "Describe your breathing?" (e g , shortness of breath, wheezing, unable to speak)       Denies SOB    8  BETTER-SAME-WORSE: "Are you getting better, staying the same or getting worse compared to yesterday?"  If getting worse, ask, "In what way?"      Same    9  HIGH RISK DISEASE: "Do you have any chronic medical problems?" (e g , asthma, heart or lung disease, weak immune system, obesity, etc )      Obese, HTN    10  VACCINE: "Have you had the COVID-19 vaccine?" If Yes, ask: "Which one, how many shots, when did you get it?"        x2    11   BOOSTER: "Have you received your COVID-19 booster?" If Yes, ask: "Which one and when did you get it?"        1    12  PREGNANCY: "Is there any chance you are pregnant?" "When was your last menstrual period?"        N/a    13  OTHER SYMPTOMS: "Do you have any other symptoms?"  (e g , chills, fatigue, headache, loss of smell or taste, muscle pain, sore throat)        Fatigue, chills    14   O2 SATURATION MONITOR:  "Do you use an oxygen saturation monitor (pulse oximeter) at home?" If Yes, ask "What is your reading (oxygen level) today?" "What is your usual oxygen saturation reading?" (e g , 95%)        erik    Protocols used: CORONAVIRUS (COVID-19) DIAGNOSED OR SUSPECTED-ADULT-

## 2022-07-17 NOTE — TELEPHONE ENCOUNTER
Received on duty page from health call  Concern for patient due to high risk PMH of obesity, HTN, and increased symptoms over last several days  The patient tested positive on Friday after having two days of increased fatigue and mild cough  He then progressed to have fever, chills, foggy head, worsening cough, chest tightness, and fullness in ears  He has been hydrating well  We reviewed possible interventions such as Paxlovid  His last labs were almost a year ago  Gave him the relevant information and he was hesitant to take the paxlovid 2/2 to possible kidney dysfunction an no recent labs  Therefore we recommended Tessalon for cough and OTC medications of mucinex, advil, and tylenol         Mendez Peters DO  [facility]  7/17/2022 2:24 PM

## 2022-08-05 ENCOUNTER — RA CDI HCC (OUTPATIENT)
Dept: OTHER | Facility: HOSPITAL | Age: 52
End: 2022-08-05

## 2022-08-05 NOTE — PROGRESS NOTES
[facility]  coding opportunities       Chart reviewed, no opportunity found: CHART REVIEWED, NO OPPORTUNITY FOUND        Patients Insurance        Commercial Insurance: Nalini Rangel

## 2022-08-20 LAB — HBA1C MFR BLD HPLC: 5.1 %

## 2022-08-24 ENCOUNTER — TELEPHONE (OUTPATIENT)
Dept: FAMILY MEDICINE CLINIC | Facility: CLINIC | Age: 52
End: 2022-08-24

## 2022-08-24 NOTE — TELEPHONE ENCOUNTER
Pt said since his surgery in March he has been having shooting pains down front and back of his legs , when he stands or walks for a period of time he has to lean on something  His insurance changed so the Doctor who did the surgery does not take his insurance  They did refer him to  but he wants to stay in the network

## 2022-09-07 ENCOUNTER — OFFICE VISIT (OUTPATIENT)
Dept: FAMILY MEDICINE CLINIC | Facility: CLINIC | Age: 52
End: 2022-09-07
Payer: COMMERCIAL

## 2022-09-07 VITALS
HEIGHT: 71 IN | HEART RATE: 90 BPM | BODY MASS INDEX: 44.1 KG/M2 | SYSTOLIC BLOOD PRESSURE: 128 MMHG | WEIGHT: 315 LBS | DIASTOLIC BLOOD PRESSURE: 82 MMHG | RESPIRATION RATE: 16 BRPM | OXYGEN SATURATION: 98 %

## 2022-09-07 DIAGNOSIS — Z12.5 SCREENING FOR PROSTATE CANCER: ICD-10-CM

## 2022-09-07 DIAGNOSIS — I10 BENIGN ESSENTIAL HYPERTENSION: Primary | ICD-10-CM

## 2022-09-07 DIAGNOSIS — E78.5 HYPERLIPIDEMIA, UNSPECIFIED HYPERLIPIDEMIA TYPE: ICD-10-CM

## 2022-09-07 DIAGNOSIS — E78.5 DYSLIPIDEMIA: ICD-10-CM

## 2022-09-07 DIAGNOSIS — E03.9 HYPOTHYROIDISM, UNSPECIFIED TYPE: ICD-10-CM

## 2022-09-07 PROCEDURE — 3074F SYST BP LT 130 MM HG: CPT | Performed by: FAMILY MEDICINE

## 2022-09-07 PROCEDURE — 3725F SCREEN DEPRESSION PERFORMED: CPT | Performed by: FAMILY MEDICINE

## 2022-09-07 PROCEDURE — 99214 OFFICE O/P EST MOD 30 MIN: CPT | Performed by: FAMILY MEDICINE

## 2022-09-07 PROCEDURE — 3079F DIAST BP 80-89 MM HG: CPT | Performed by: FAMILY MEDICINE

## 2022-09-07 RX ORDER — HYDROCHLOROTHIAZIDE 12.5 MG/1
12.5 CAPSULE, GELATIN COATED ORAL EVERY MORNING
Qty: 90 CAPSULE | Refills: 1 | Status: SHIPPED | OUTPATIENT
Start: 2022-09-07

## 2022-09-07 RX ORDER — ROSUVASTATIN CALCIUM 10 MG/1
10 TABLET, COATED ORAL DAILY
Qty: 90 TABLET | Refills: 1 | Status: SHIPPED | OUTPATIENT
Start: 2022-09-07

## 2022-09-07 RX ORDER — LEVOTHYROXINE SODIUM 0.1 MG/1
100 TABLET ORAL DAILY
Qty: 90 TABLET | Refills: 1 | Status: SHIPPED | OUTPATIENT
Start: 2022-09-07

## 2022-09-07 RX ORDER — METOPROLOL SUCCINATE 50 MG/1
50 TABLET, EXTENDED RELEASE ORAL DAILY
Qty: 90 TABLET | Refills: 1 | Status: SHIPPED | OUTPATIENT
Start: 2022-09-07

## 2022-09-07 NOTE — PROGRESS NOTES
Subjective:      Patient ID: Manuel Ledezma is a 46 y o  male  Hypertension  This is a chronic problem  The current episode started more than 1 year ago  The problem is controlled  Pertinent negatives include no chest pain or palpitations  Risk factors for coronary artery disease include dyslipidemia, male gender and obesity  Past treatments include diuretics and beta blockers  The current treatment provides significant improvement  There are no compliance problems  Identifiable causes of hypertension include a thyroid problem  Hyperlipidemia  This is a chronic problem  The current episode started more than 1 year ago  The problem is controlled  Recent lipid tests were reviewed and are normal  Pertinent negatives include no chest pain, focal sensory loss or myalgias  Current antihyperlipidemic treatment includes statins  The current treatment provides significant improvement of lipids  There are no compliance problems  Risk factors for coronary artery disease include dyslipidemia  Thyroid Problem  Presents for follow-up visit  Patient reports no palpitations or tremors  The symptoms have been stable (Levothyroxine 100 microgram)  His past medical history is significant for hyperlipidemia  Past Medical History:   Diagnosis Date    Disease of thyroid gland     Hypertension        Family History   Problem Relation Age of Onset    Emphysema Mother     Bone cancer Mother    [place] Breast cancer Mother    [place] Lung cancer Mother    [place] COPD Mother     Dementia Father     Stroke Father     Hyperlipidemia Father     Hypertension Father     Diabetes type II Father     Diabetes Father     Thyroid cancer Sister        Past Surgical History:   Procedure Laterality Date    EPIDURAL BLOCK INJECTION N/A 9/3/2021    Procedure: L4 L5 lumbar epidural steroid injection (88037);   Surgeon: Thomas Storm MD;  Location: [facility] MAIN OR;  Service: Pain Management     EPIDURAL BLOCK INJECTION N/A 11/24/2021    Procedure: L4 L5 lumbar epidural steroid injection ([id number]); Surgeon: Magui Hernandez MD;  Location: [facility] MAIN OR;  Service: Pain Management     EPIDURAL BLOCK INJECTION N/A 3/4/2022    Procedure: L4 L5 lumbar epidural steroid injection ([id number]); Surgeon: Magui Hernandez MD;  Location: [facility] MAIN OR;  Service: Pain Management     NO PAST SURGERIES      FL INJECTION,SACROILIAC JOINT Bilateral 8/13/2021    Procedure: SACROILIAC JOINT INJECTION ([id number]); Surgeon: Magui Hernandez MD;  Location: [facility] MAIN OR;  Service: Pain Management         reports that he has never smoked  He has never used smokeless tobacco  He reports current alcohol use  He reports that he does not use drugs        Current Outpatient Medications:     alfuzosin (UROXATRAL) 10 mg 24 hr tablet, Take 10 mg by mouth daily, Disp: , Rfl:     ALPRAZolam (XANAX) 0 25 mg tablet, Take 1 tablet (0 25 mg total) by mouth daily at bedtime as needed for anxiety, Disp: 30 tablet, Rfl: 0    hydrochlorothiazide (MICROZIDE) 12 5 mg capsule, Take 1 capsule (12 5 mg total) by mouth every morning, Disp: 90 capsule, Rfl: 1    levothyroxine (Euthyrox) 100 mcg tablet, Take 1 tablet (100 mcg total) by mouth daily, Disp: 90 tablet, Rfl: 1    metoprolol succinate (TOPROL-XL) 50 mg 24 hr tablet, Take 1 tablet (50 mg total) by mouth daily, Disp: 90 tablet, Rfl: 1    naproxen (NAPROSYN) 500 mg tablet, Take 1 tablet (500 mg total) by mouth 2 (two) times a day as needed (pain (with food)), Disp: 60 tablet, Rfl: 2    rosuvastatin (CRESTOR) 10 MG tablet, Take 1 tablet (10 mg total) by mouth daily, Disp: 90 tablet, Rfl: 1    tadalafil (CIALIS) 5 MG tablet, Take 5 mg by mouth, Disp: , Rfl:     topiramate (TOPAMAX) 100 mg tablet, Take 1 tablet (100 mg total) by mouth 2 (two) times a day TAKE 1 TABLET BY MOUTH IN THE MORNING AND 2 AT NIGHT, Disp: 60 tablet, Rfl: 2    benzonatate (TESSALON PERLES) 100 mg capsule, Take 1 capsule (100 mg total) by mouth 3 (three) times a day as needed for cough (Patient not taking: Reported on 9/7/2022), Disp: 20 capsule, Rfl: 0    guaiFENesin (MUCINEX) 600 mg 12 hr tablet, Take 2 tablets (1,200 mg total) by mouth every 12 (twelve) hours (Patient not taking: Reported on 9/7/2022), Disp: 60 tablet, Rfl: 0    The following portions of the patient's history were reviewed and updated as appropriate: allergies, current medications, past family history, past medical history, past social history, past surgical history and problem list     Review of Systems   Constitutional: Negative  Respiratory: Negative  Cardiovascular: Negative  Negative for chest pain and palpitations  Gastrointestinal: Negative  Musculoskeletal: Negative  Negative for myalgias  Neurological: Negative  Negative for tremors  Psychiatric/Behavioral: Negative  Objective:    /82   Pulse 90   Resp 16   Ht 5' 11" (1 803 m)   Wt (!) 144 kg (317 lb)   SpO2 98%   BMI 44 21 kg/m²      Physical Exam  Constitutional:       Appearance: He is well-developed  HENT:      Mouth/Throat:      Pharynx: No oropharyngeal exudate  Cardiovascular:      Rate and Rhythm: Normal rate and regular rhythm  Pulmonary:      Effort: Pulmonary effort is normal       Breath sounds: Normal breath sounds  Abdominal:      General: Bowel sounds are normal       Palpations: Abdomen is soft  Neurological:      Mental Status: He is alert and oriented to person, place, and time  Psychiatric:         Behavior: Behavior normal          Judgment: Judgment normal            Recent Results (from the past 1008 hour(s))   Hemoglobin A1C    Collection Time: 08/20/22 12:00 AM   Result Value Ref Range    Hemoglobin A1C 5 1        Assessment/Plan:    No problem-specific Assessment & Plan notes found for this encounter             Problem List Items Addressed This Visit        Endocrine    Hypothyroidism     TSH is at goal   Continue levothyroxine 100 micrograms         Relevant Medications levothyroxine (Euthyrox) 100 mcg tablet    metoprolol succinate (TOPROL-XL) 50 mg 24 hr tablet    Other Relevant Orders    TSH, 3rd generation with Free T4 reflex       Cardiovascular and Mediastinum    Benign essential hypertension - Primary     The patient's blood pressure is at goal   Continue hydrochlorothiazide 12 5, metoprolol XL 50 milligram          Relevant Medications    hydrochlorothiazide (MICROZIDE) 12 5 mg capsule    metoprolol succinate (TOPROL-XL) 50 mg 24 hr tablet    Other Relevant Orders    Ambulatory Referral to Cardiology       Other    Dyslipidemia     Cholesterol improved significantly on Crestor 10 milligram   Continue same    Recommended metabolic labs/follow-up at 6 month interval         Relevant Orders    Ambulatory Referral to Cardiology    Comprehensive metabolic panel    Lipid panel    Hyperlipidemia    Relevant Medications    rosuvastatin (CRESTOR) 10 MG tablet    Screening for prostate cancer    Relevant Orders    PSA, Total Screen

## 2022-11-06 PROBLEM — Z12.5 SCREENING FOR PROSTATE CANCER: Status: RESOLVED | Noted: 2019-02-28 | Resolved: 2022-11-06

## 2022-11-25 ENCOUNTER — TELEPHONE (OUTPATIENT)
Dept: OTHER | Facility: OTHER | Age: 52
End: 2022-11-25

## 2022-11-25 NOTE — TELEPHONE ENCOUNTER
Patient is calling regarding cancelling an appointment      Date/Time:11-25-22 @ 0800    Patient was rescheduled: YES [] NO [x]    Patient requesting call back to reschedule: YES [x] NO []

## 2023-01-19 ENCOUNTER — CONSULT (OUTPATIENT)
Dept: CARDIOLOGY CLINIC | Facility: CLINIC | Age: 53
End: 2023-01-19

## 2023-01-19 VITALS
DIASTOLIC BLOOD PRESSURE: 86 MMHG | SYSTOLIC BLOOD PRESSURE: 146 MMHG | BODY MASS INDEX: 44.1 KG/M2 | HEART RATE: 66 BPM | HEIGHT: 71 IN | WEIGHT: 315 LBS

## 2023-01-19 DIAGNOSIS — E78.5 DYSLIPIDEMIA: ICD-10-CM

## 2023-01-19 DIAGNOSIS — I10 BENIGN ESSENTIAL HYPERTENSION: ICD-10-CM

## 2023-01-19 NOTE — PROGRESS NOTES
Consultation - Cardiology   Delano Lora 45 y o  male MRN: [id number]  Unit/Bed#:  Encounter: [id number]      Assessment and Plan: Active Problems:    * No active hospital problems  *    # HTN - controlled  # Obesity    # Cardiac risk assessment  ASCVD risk borderline -  He is on Crestor  2017 screening lung CT did not show coronary calcium  Overall his cardiac risk appears low with controlled risk factors  I re-emphasized healthy lifestyle  He will try to get his back pain addressed to be more active  I educated him on coronary artery disease, myocardial infarction and cardiac arrest    All questions were answered  ECG showed NSR, 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  No additional cardiac testing required at the moment  History of Present Illness   Physician Requesting Consult: No att  providers found  Reason for Consult / Principal Problem: Cardiac risk assessment  HPI: Delano Lora is a 45y o  year old male with PMH of obesity and hypertension comes to cardiology clinic for cardiac risk assessment  He is concerned about developing heart disease since gaining weight and turning middle aged  He is on metoprolol for blood pressure and it is under control  He has never smoked and has never developed diabetes  He is on Crestor for lowering cardiac risk  He tries to eat healthy  He used to be very active but recently he is dealing with back MSK issues that has limited his level of physical activity  He denies chest pain, shortness of breath or any other symptoms  He does not have family hx of premature ASCVD  Consults    Review of Systems:  Review of Systems  All negative except as mentioned in the HPI    Historical Information   Past Medical History:   Diagnosis Date   • Disease of thyroid gland    • Hypertension      Past Surgical History:   Procedure Laterality Date   • EPIDURAL BLOCK INJECTION N/A 9/3/2021    Procedure: L4 L5 lumbar epidural steroid injection (49815);   Surgeon: Lior Leiva MD; Location: [place] [address] MAIN OR;  Service: Pain Management    • EPIDURAL BLOCK INJECTION N/A 11/24/2021    Procedure: L4 L5 lumbar epidural steroid injection ([id number]); Surgeon: Jude Fleming MD;  Location: [facility] MAIN OR;  Service: Pain Management    • EPIDURAL BLOCK INJECTION N/A 3/4/2022    Procedure: L4 L5 lumbar epidural steroid injection ([id number]); Surgeon: Jude Fleming MD;  Location: [facility] MAIN OR;  Service: Pain Management    • NO PAST SURGERIES     • AK INJECT SI JOINT ARTHRGRPHY&/ANES/STEROID W/LAWSON Bilateral 8/13/2021    Procedure: SACROILIAC JOINT INJECTION ([id number]); Surgeon: Jude Fleming MD;  Location: [facility] MAIN OR;  Service: Pain Management      Social History     Substance and Sexual Activity   Alcohol Use Yes   • Alcohol/week: 0 0 standard drinks    Comment: Social  Lay 1 or 2 drinks on the weekend  Social History     Substance and Sexual Activity   Drug Use No     Social History     Tobacco Use   Smoking Status Never   Smokeless Tobacco Never   Tobacco Comments    AT RISK FROM PASSIVE SMOKING      Family History: non-contributory    Meds/Allergies   all current active meds have been reviewed  No Known Allergies    Objective   Vitals: Blood pressure 146/86, pulse 66, height 5' 11" (1 803 m), weight (!) 145 kg (319 lb)  , Body mass index is 44 49 kg/m²  ,       Physical Exam:  Physical Exam  General: alert, oriented X 3 , comfortable  Neck: No JVD  Cardiac: normal S1, S2, no m/r/g  Respiratory: normal breath sounds, no wheezes or crackles  Abdomen: soft and non-tender  Extremities: warm, no cyanosis, no lower extremity edema      Lab Results:     No results found for: CKTOTAL, CKMB, CKMBINDEX, TROPONINI    Lab Results   Component Value Date    CALCIUM 8 8 12/10/2016     12/10/2016    K 4 0 08/27/2021    CO2 25 08/27/2021     08/27/2021    BUN 18 08/27/2021    CREATININE 0 80 08/27/2021       Lab Results   Component Value Date    WBC 6 4 07/24/2018    HGB 14 9 07/24/2018    HCT 43 9 07/24/2018    MCV 90 07/24/2018     07/24/2018       Lab Results   Component Value Date    CHOL 173 12/10/2016    CHOL 159 06/10/2016     Lab Results   Component Value Date    HDL 43 08/27/2021    HDL 38 (L) 04/30/2021    HDL 35 (L) 07/17/2020     Lab Results   Component Value Date    LDLCALC 114 (H) 08/27/2021    LDLCALC 138 (H) 04/30/2021    LDLCALC 135 (H) 07/17/2020     Lab Results   Component Value Date    TRIG 88 08/27/2021    TRIG 104 04/30/2021    TRIG 102 07/17/2020       Lab Results   Component Value Date    ALT 48 (H) 08/27/2021    AST 25 08/27/2021               Imaging: I have personally reviewed pertinent reports

## 2023-03-02 ENCOUNTER — TELEPHONE (OUTPATIENT)
Dept: FAMILY MEDICINE CLINIC | Facility: CLINIC | Age: 53
End: 2023-03-02

## 2023-03-02 DIAGNOSIS — F41.9 ANXIETY: Primary | ICD-10-CM

## 2023-03-02 RX ORDER — SERTRALINE HYDROCHLORIDE 25 MG/1
25 TABLET, FILM COATED ORAL DAILY
Qty: 30 TABLET | Refills: 1 | Status: SHIPPED | OUTPATIENT
Start: 2023-03-02 | End: 2023-08-29

## 2023-03-02 NOTE — TELEPHONE ENCOUNTER
Patient was advised  He is taking Meloxicam and Rosalba Marte that he monitors his symptoms and if he notices any change in stool color (Darker/Black) then he should stop the zoloft and proceed to the ER, he agreed

## 2023-03-02 NOTE — TELEPHONE ENCOUNTER
I spoke with patient and the therapist has recommended that Zoloft may be beneficial for him  He canceled his appointment tonight because he did not have labs done  He wasn't sure if you would be able to start him on this or want him to wait until his May 5th appointment

## 2023-03-02 NOTE — TELEPHONE ENCOUNTER
Jessica Mckeon would like to speak with Dr Bebo Victoria or her ma regarding a medication his therapist suggested he should try

## 2023-04-28 DIAGNOSIS — E03.9 HYPOTHYROIDISM, UNSPECIFIED TYPE: ICD-10-CM

## 2023-04-28 RX ORDER — LEVOTHYROXINE SODIUM 0.1 MG/1
100 TABLET ORAL DAILY
Qty: 90 TABLET | Refills: 0 | Status: SHIPPED | OUTPATIENT
Start: 2023-04-28 | End: 2023-05-05

## 2023-05-04 RX ORDER — FLUTICASONE PROPIONATE 50 MCG
SPRAY, SUSPENSION (ML) NASAL
COMMUNITY
Start: 2023-04-11

## 2023-05-04 RX ORDER — MELOXICAM 15 MG/1
TABLET ORAL
COMMUNITY
Start: 2023-04-03

## 2023-05-04 RX ORDER — TAMSULOSIN HYDROCHLORIDE 0.4 MG/1
CAPSULE ORAL
COMMUNITY
Start: 2023-03-27

## 2023-05-05 ENCOUNTER — OFFICE VISIT (OUTPATIENT)
Dept: FAMILY MEDICINE CLINIC | Facility: CLINIC | Age: 53
End: 2023-05-05

## 2023-05-05 VITALS
OXYGEN SATURATION: 99 % | HEART RATE: 92 BPM | HEIGHT: 71 IN | BODY MASS INDEX: 44.1 KG/M2 | SYSTOLIC BLOOD PRESSURE: 142 MMHG | DIASTOLIC BLOOD PRESSURE: 82 MMHG | WEIGHT: 315 LBS

## 2023-05-05 DIAGNOSIS — Z01.818 PREOPERATIVE CLEARANCE: Primary | ICD-10-CM

## 2023-05-05 DIAGNOSIS — I10 BENIGN ESSENTIAL HYPERTENSION: ICD-10-CM

## 2023-05-05 DIAGNOSIS — F41.9 ANXIETY: ICD-10-CM

## 2023-05-05 DIAGNOSIS — M43.26 FUSION OF LUMBAR SPINE: ICD-10-CM

## 2023-05-05 DIAGNOSIS — E03.9 HYPOTHYROIDISM, UNSPECIFIED TYPE: ICD-10-CM

## 2023-05-05 DIAGNOSIS — E78.5 HYPERLIPIDEMIA, UNSPECIFIED HYPERLIPIDEMIA TYPE: ICD-10-CM

## 2023-05-05 RX ORDER — LEVOTHYROXINE SODIUM 112 UG/1
112 TABLET ORAL
Qty: 90 TABLET | Refills: 1 | Status: SHIPPED | OUTPATIENT
Start: 2023-05-05

## 2023-05-05 RX ORDER — METOPROLOL SUCCINATE 25 MG/1
25 TABLET, EXTENDED RELEASE ORAL DAILY
Qty: 90 TABLET | Refills: 1 | Status: SHIPPED | OUTPATIENT
Start: 2023-05-05

## 2023-05-05 RX ORDER — ROSUVASTATIN CALCIUM 10 MG/1
10 TABLET, COATED ORAL DAILY
Qty: 90 TABLET | Refills: 1 | Status: SHIPPED | OUTPATIENT
Start: 2023-05-05

## 2023-05-05 RX ORDER — METOPROLOL SUCCINATE 50 MG/1
50 TABLET, EXTENDED RELEASE ORAL DAILY
Qty: 90 TABLET | Refills: 1 | Status: SHIPPED | OUTPATIENT
Start: 2023-05-05

## 2023-05-05 NOTE — ASSESSMENT & PLAN NOTE
Patient noted to have borderline high blood pressure since he stopped hydrochlorothiazide  Patient was concerned about the risk of cancer with being on hydrochlorothiazide  He is currently on metoprolol XL 50 mg  Advised to increase the dose to 75 mg  Patient prefers to continue monitoring his blood pressure at home  Follow-up if blood pressure continues to be above 140/90

## 2023-05-05 NOTE — ASSESSMENT & PLAN NOTE
Currently symptoms are stable without being on any medications  Patient prefers to monitor his symptoms  Follow-up if symptoms worsen

## 2023-05-05 NOTE — ASSESSMENT & PLAN NOTE
PATIENT IS AT  LOW  CARDIOVASCULAR RISK FOR THE SURGERY  ADVISED RE-EVALUATION IF HE DEVELOPS ANY SYMPTOMS OF CHEST PAIN OR SHORTNESS OF BREATH PRIOR TO SURGERY

## 2023-05-05 NOTE — ASSESSMENT & PLAN NOTE
Patient noted to have borderline high TSH  Patient is symptomatic  Advised to increase the dose of levothyroxine 212 mcg    Continue monitoring with metabolic labs at 6-month interval

## 2023-05-05 NOTE — PROGRESS NOTES
Subjective:      Patient ID: Subhash Granda is a 45 y o  male  Chief Complaint   Patient presents with    Pre-op Exam       HPI    Patient is here for preop clearance  Patient has history of hypertension  Noted to have borderline high blood pressure today  Patient stopped taking hydrochlorothiazide since he was concerned about risk of cancer  He is currently on metoprolol XL 50 mg  Also noted to have borderline high TSH  He is currently on levothyroxine 100 mcg  His cholesterol panel has improved significantly since he started Crestor 10 mg   Denying any side effects to any of the medications  The following portions of the patient's history were reviewed and updated as appropriate: allergies, current medications, past family history, past medical history, past social history, past surgical history and problem list     Procedure date: 5/22/23    Surgeon:  Dr David Evans procedure:  Lumbar spinal fusion    Diagnosis for procedure:  Lumbar DDD    Prior anesthesia: yes        Problem with anesthesia: no    CAD History: no    INDY history:no    Exercise Capacity:  · Able to walk 4 blocks without symptoms?: Yes  · Able to walk 2 flights without symptoms?: Yes        Review of Systems   Constitutional: Negative  Respiratory: Negative  Cardiovascular: Negative  Psychiatric/Behavioral: Negative            Current Outpatient Medications   Medication Sig Dispense Refill    fluticasone (FLONASE) 50 mcg/act nasal spray USE 2 SPRAY(S) IN EACH NOSTRIL ONCE DAILY      levothyroxine (Euthyrox) 112 mcg tablet Take 1 tablet (112 mcg total) by mouth daily in the early morning 90 tablet 1    meloxicam (MOBIC) 15 mg tablet TAKE 1 TABLET BY MOUTH ONCE DAILY PLEASE TAKE WITH FOOD DO NOT TAKE WITH OTHER NSAIDS      metoprolol succinate (TOPROL-XL) 25 mg 24 hr tablet Take 1 tablet (25 mg total) by mouth daily 90 tablet 1    metoprolol succinate (TOPROL-XL) 50 mg 24 hr tablet Take 1 tablet (50 mg total) by mouth daily 90 tablet 1    rosuvastatin (CRESTOR) 10 MG tablet Take 1 tablet (10 mg total) by mouth daily 90 tablet 1    sertraline (ZOLOFT) 25 mg tablet Take 1 tablet (25 mg total) by mouth daily 30 tablet 1    tamsulosin (FLOMAX) 0 4 mg TAKE 1 CAPSULE BY MOUTH NIGHTLY      alfuzosin (UROXATRAL) 10 mg 24 hr tablet Take 10 mg by mouth daily (Patient not taking: Reported on 1/19/2023)      ALPRAZolam (XANAX) 0 25 mg tablet Take 1 tablet (0 25 mg total) by mouth daily at bedtime as needed for anxiety (Patient not taking: Reported on 1/19/2023) 30 tablet 0    benzonatate (TESSALON PERLES) 100 mg capsule Take 1 capsule (100 mg total) by mouth 3 (three) times a day as needed for cough (Patient not taking: Reported on 9/7/2022) 20 capsule 0    guaiFENesin (MUCINEX) 600 mg 12 hr tablet Take 2 tablets (1,200 mg total) by mouth every 12 (twelve) hours (Patient not taking: Reported on 9/7/2022) 60 tablet 0    naproxen (NAPROSYN) 500 mg tablet Take 1 tablet (500 mg total) by mouth 2 (two) times a day as needed (pain (with food)) (Patient not taking: Reported on 1/19/2023) 60 tablet 2    tadalafil (CIALIS) 5 MG tablet Take 5 mg by mouth (Patient not taking: Reported on 1/19/2023)      topiramate (TOPAMAX) 100 mg tablet Take 1 tablet (100 mg total) by mouth 2 (two) times a day TAKE 1 TABLET BY MOUTH IN THE MORNING AND 2 AT NIGHT (Patient not taking: Reported on 1/19/2023) 60 tablet 2     No current facility-administered medications for this visit  Patient has no known allergies  Past Medical History:   Diagnosis Date    Disease of thyroid gland     Hypertension        Past Surgical History:   Procedure Laterality Date    EPIDURAL BLOCK INJECTION N/A 9/3/2021    Procedure: L4 L5 lumbar epidural steroid injection (70663);   Surgeon: Chance Quinn MD;  Location: [facility] MAIN OR;  Service: Pain Management     EPIDURAL BLOCK INJECTION N/A 11/24/2021    Procedure: L4 L5 lumbar epidural steroid injection ([id number]); "Surgeon: Jaylon Regalado MD;  Location: [facility] MAIN OR;  Service: Pain Management     EPIDURAL BLOCK INJECTION N/A 3/4/2022    Procedure: L4 L5 lumbar epidural steroid injection ([id number]); Surgeon: Jaylon Regalado MD;  Location: [facility] MAIN OR;  Service: Pain Management     NO PAST SURGERIES      OR INJECT SI JOINT ARTHRGRPHY&/ANES/STEROID W/LAWSON Bilateral 8/13/2021    Procedure: SACROILIAC JOINT INJECTION ([id number]); Surgeon: Jaylon Regalado MD;  Location: [facility] MAIN OR;  Service: Pain Management        Family History   Problem Relation Age of Onset    Emphysema Mother     Bone cancer Mother    [place] Breast cancer Mother     Lung cancer Mother    [place] Perches COPD Mother    [place]issa Perches Dementia Father     Stroke Father     Hyperlipidemia Father     Hypertension Father     Diabetes type II Father     Diabetes Father     Thyroid cancer Sister        Social History     Tobacco Use    Smoking status: Never    Smokeless tobacco: Never    Tobacco comments:     AT RISK FROM PASSIVE SMOKING    Vaping Use    Vaping Use: Never used   Substance Use Topics    Alcohol use: Yes     Alcohol/week: 0 0 standard drinks     Comment: Social  Lay 1 or 2 drinks on the weekend   Drug use: No       Objective:    Vitals:    05/05/23 0934   BP: 142/82   Pulse: 92   SpO2: 99%   Weight: (!) 143 kg (315 lb)   Height: 5' 11\" (1 803 m)        Physical Exam  Constitutional:       Appearance: Normal appearance  Cardiovascular:      Heart sounds: Normal heart sounds  Pulmonary:      Breath sounds: Normal breath sounds  Neurological:      Mental Status: He is oriented to person, place, and time  Psychiatric:         Mood and Affect: Mood normal            Preop labs/testing available and reviewed: yes  APTT pending  Laboratory Results: I have personally reviewed the pertinent laboratory results/reports     EKG: I have personally reviewed pertinent reports        Chest x-ray: NA      Assessment/Plan:    Patient is cleared for planned " procedure  Further testing/evaluation is required  Postop concerns: no         Problem List Items Addressed This Visit        Endocrine    Hypothyroidism     Patient noted to have borderline high TSH  Patient is symptomatic  Advised to increase the dose of levothyroxine 212 mcg  Continue monitoring with metabolic labs at 6-month interval          Relevant Medications    metoprolol succinate (TOPROL-XL) 50 mg 24 hr tablet    metoprolol succinate (TOPROL-XL) 25 mg 24 hr tablet    levothyroxine (Euthyrox) 112 mcg tablet    Other Relevant Orders    TSH, 3rd generation with Free T4 reflex       Cardiovascular and Mediastinum    Benign essential hypertension     Patient noted to have borderline high blood pressure since he stopped hydrochlorothiazide  Patient was concerned about the risk of cancer with being on hydrochlorothiazide  He is currently on metoprolol XL 50 mg  Advised to increase the dose to 75 mg  Patient prefers to continue monitoring his blood pressure at home  Follow-up if blood pressure continues to be above 140/90  Relevant Medications    metoprolol succinate (TOPROL-XL) 50 mg 24 hr tablet    metoprolol succinate (TOPROL-XL) 25 mg 24 hr tablet       Musculoskeletal and Integument    Fusion of lumbar spine      patient scheduled for lumbar spinal fusion surgery  Other    Hyperlipidemia     LDL is at goal   Continue statin  Continue monitoring  Relevant Medications    rosuvastatin (CRESTOR) 10 MG tablet    Other Relevant Orders    Comprehensive metabolic panel    Lipid panel    Anxiety     Currently symptoms are stable without being on any medications  Patient prefers to monitor his symptoms  Follow-up if symptoms worsen  Preoperative clearance - Primary     PATIENT IS AT  LOW  CARDIOVASCULAR RISK FOR THE SURGERY  ADVISED RE-EVALUATION IF HE DEVELOPS ANY SYMPTOMS OF CHEST PAIN OR SHORTNESS OF BREATH PRIOR TO SURGERY

## 2023-05-08 ENCOUNTER — TELEPHONE (OUTPATIENT)
Dept: FAMILY MEDICINE CLINIC | Facility: CLINIC | Age: 53
End: 2023-05-08

## 2023-05-08 NOTE — TELEPHONE ENCOUNTER
lmtc  Per Dr Sharon Pardo if surgeon did not order pt/inr and aptt she will order and pt needs to have done

## 2023-11-06 DIAGNOSIS — E78.5 HYPERLIPIDEMIA, UNSPECIFIED HYPERLIPIDEMIA TYPE: ICD-10-CM

## 2023-11-06 DIAGNOSIS — E03.9 HYPOTHYROIDISM, UNSPECIFIED TYPE: ICD-10-CM

## 2023-11-06 DIAGNOSIS — I10 BENIGN ESSENTIAL HYPERTENSION: ICD-10-CM

## 2023-11-06 RX ORDER — ROSUVASTATIN CALCIUM 10 MG/1
10 TABLET, COATED ORAL DAILY
Qty: 90 TABLET | Refills: 0 | Status: SHIPPED | OUTPATIENT
Start: 2023-11-06 | End: 2023-11-16 | Stop reason: SDUPTHER

## 2023-11-06 RX ORDER — METOPROLOL SUCCINATE 25 MG/1
25 TABLET, EXTENDED RELEASE ORAL DAILY
Qty: 90 TABLET | Refills: 0 | Status: SHIPPED | OUTPATIENT
Start: 2023-11-06 | End: 2023-11-16 | Stop reason: SDUPTHER

## 2023-11-06 RX ORDER — METOPROLOL SUCCINATE 50 MG/1
50 TABLET, EXTENDED RELEASE ORAL DAILY
Qty: 90 TABLET | Refills: 0 | Status: SHIPPED | OUTPATIENT
Start: 2023-11-06 | End: 2023-11-16 | Stop reason: SDUPTHER

## 2023-11-06 RX ORDER — LEVOTHYROXINE SODIUM 112 UG/1
112 TABLET ORAL
Qty: 90 TABLET | Refills: 0 | Status: SHIPPED | OUTPATIENT
Start: 2023-11-06 | End: 2023-11-16 | Stop reason: SDUPTHER

## 2023-11-16 ENCOUNTER — OFFICE VISIT (OUTPATIENT)
Dept: FAMILY MEDICINE CLINIC | Facility: CLINIC | Age: 53
End: 2023-11-16
Payer: COMMERCIAL

## 2023-11-16 VITALS
SYSTOLIC BLOOD PRESSURE: 120 MMHG | HEIGHT: 71 IN | BODY MASS INDEX: 44.1 KG/M2 | DIASTOLIC BLOOD PRESSURE: 70 MMHG | WEIGHT: 315 LBS | OXYGEN SATURATION: 98 % | HEART RATE: 83 BPM

## 2023-11-16 DIAGNOSIS — I10 BENIGN ESSENTIAL HYPERTENSION: ICD-10-CM

## 2023-11-16 DIAGNOSIS — E03.9 HYPOTHYROIDISM, UNSPECIFIED TYPE: ICD-10-CM

## 2023-11-16 DIAGNOSIS — E78.5 HYPERLIPIDEMIA, UNSPECIFIED HYPERLIPIDEMIA TYPE: ICD-10-CM

## 2023-11-16 DIAGNOSIS — Z23 ENCOUNTER FOR IMMUNIZATION: Primary | ICD-10-CM

## 2023-11-16 PROCEDURE — 99214 OFFICE O/P EST MOD 30 MIN: CPT | Performed by: FAMILY MEDICINE

## 2023-11-16 RX ORDER — METOPROLOL SUCCINATE 25 MG/1
25 TABLET, EXTENDED RELEASE ORAL DAILY
Qty: 90 TABLET | Refills: 1 | Status: SHIPPED | OUTPATIENT
Start: 2023-11-16

## 2023-11-16 RX ORDER — LEVOTHYROXINE SODIUM 112 UG/1
112 TABLET ORAL
Qty: 90 TABLET | Refills: 1 | Status: SHIPPED | OUTPATIENT
Start: 2023-11-16

## 2023-11-16 RX ORDER — ROSUVASTATIN CALCIUM 10 MG/1
10 TABLET, COATED ORAL DAILY
Qty: 90 TABLET | Refills: 1 | Status: SHIPPED | OUTPATIENT
Start: 2023-11-16

## 2023-11-16 RX ORDER — METOPROLOL SUCCINATE 50 MG/1
50 TABLET, EXTENDED RELEASE ORAL DAILY
Qty: 90 TABLET | Refills: 1 | Status: SHIPPED | OUTPATIENT
Start: 2023-11-16

## 2023-11-16 NOTE — PROGRESS NOTES
Subjective:      Patient ID: Radha Stevens is a 45 y.o. male. HPI    Patient is here for routine 6-month follow-up. Has history of hypertension, hypothyroidism, hyperlipidemia. Metabolic labs reviewed with patient today. His fasting blood sugar, TSH, blood pressure and LDL are at goal.  Patient is currently on metoprolol XL 75 mg daily, Crestor 10 mg, levothyroxine 112 mcg. Patient tolerating all medications without any side effects. Reports significant improvement in his symptoms of anxiety, currently not taking Zoloft. Past Medical History:   Diagnosis Date    Disease of thyroid gland     Hypertension        Family History   Problem Relation Age of Onset    Emphysema Mother     Bone cancer Mother     Breast cancer Mother     Lung cancer Mother     COPD Mother     Dementia Father     Stroke Father     Hyperlipidemia Father     Hypertension Father     Diabetes type II Father     Diabetes Father     Thyroid cancer Sister        Past Surgical History:   Procedure Laterality Date    EPIDURAL BLOCK INJECTION N/A 9/3/2021    Procedure: L4 L5 lumbar epidural steroid injection (99951); Surgeon: Glory Gaffney MD;  Location: [facility] OR;  Service: Pain Management     EPIDURAL BLOCK INJECTION N/A 11/24/2021    Procedure: L4 L5 lumbar epidural steroid injection ([id number]); Surgeon: Glory Gaffney MD;  Location: [facility] MAIN OR;  Service: Pain Management     EPIDURAL BLOCK INJECTION N/A 3/4/2022    Procedure: L4 L5 lumbar epidural steroid injection ([id number]); Surgeon: Glory Gaffney MD;  Location: [facility] OR;  Service: Pain Management     NO PAST SURGERIES      FL INJECT SI JOINT ARTHRGRPHY&/ANES/STEROID W/LAWSON Bilateral 8/13/2021    Procedure: SACROILIAC JOINT INJECTION ([id number]); Surgeon: Glory Gaffney MD;  Location: [facility] OR;  Service: Pain Management         reports that he has never smoked. He has never used smokeless tobacco. He reports current alcohol use.  He reports that he does not use drugs.      Current Outpatient Medications:     fluticasone (FLONASE) 50 mcg/act nasal spray, USE 2 SPRAY(S) IN EACH NOSTRIL ONCE DAILY, Disp: , Rfl:     levothyroxine 112 mcg tablet, TAKE 1 TABLET BY MOUTH DAILY IN THE EARLY MORNING, Disp: 90 tablet, Rfl: 0    meloxicam (MOBIC) 15 mg tablet, TAKE 1 TABLET BY MOUTH ONCE DAILY PLEASE TAKE WITH FOOD DO NOT TAKE WITH OTHER NSAIDS, Disp: , Rfl:     metoprolol succinate (TOPROL-XL) 25 mg 24 hr tablet, Take 1 tablet by mouth once daily, Disp: 90 tablet, Rfl: 0    metoprolol succinate (TOPROL-XL) 50 mg 24 hr tablet, Take 1 tablet by mouth once daily, Disp: 90 tablet, Rfl: 0    rosuvastatin (CRESTOR) 10 MG tablet, Take 1 tablet by mouth once daily, Disp: 90 tablet, Rfl: 0    tamsulosin (FLOMAX) 0.4 mg, TAKE 1 CAPSULE BY MOUTH NIGHTLY, Disp: , Rfl:     alfuzosin (UROXATRAL) 10 mg 24 hr tablet, Take 10 mg by mouth daily (Patient not taking: Reported on 1/19/2023), Disp: , Rfl:     ALPRAZolam (XANAX) 0.25 mg tablet, Take 1 tablet (0.25 mg total) by mouth daily at bedtime as needed for anxiety (Patient not taking: Reported on 1/19/2023), Disp: 30 tablet, Rfl: 0    benzonatate (TESSALON PERLES) 100 mg capsule, Take 1 capsule (100 mg total) by mouth 3 (three) times a day as needed for cough (Patient not taking: Reported on 9/7/2022), Disp: 20 capsule, Rfl: 0    guaiFENesin (MUCINEX) 600 mg 12 hr tablet, Take 2 tablets (1,200 mg total) by mouth every 12 (twelve) hours (Patient not taking: Reported on 9/7/2022), Disp: 60 tablet, Rfl: 0    naproxen (NAPROSYN) 500 mg tablet, Take 1 tablet (500 mg total) by mouth 2 (two) times a day as needed (pain (with food)) (Patient not taking: Reported on 1/19/2023), Disp: 60 tablet, Rfl: 2    sertraline (ZOLOFT) 25 mg tablet, Take 1 tablet (25 mg total) by mouth daily, Disp: 30 tablet, Rfl: 1    tadalafil (CIALIS) 5 MG tablet, Take 5 mg by mouth (Patient not taking: Reported on 1/19/2023), Disp: , Rfl:     topiramate (TOPAMAX) 100 mg tablet, Take 1 tablet (100 mg total) by mouth 2 (two) times a day TAKE 1 TABLET BY MOUTH IN THE MORNING AND 2 AT NIGHT (Patient not taking: Reported on 1/19/2023), Disp: 60 tablet, Rfl: 2    The following portions of the patient's history were reviewed and updated as appropriate: allergies, current medications, past family history, past medical history, past social history, past surgical history and problem list.    Review of Systems   Constitutional: Negative. Respiratory: Negative. Cardiovascular: Negative. Objective:    /80   Pulse 83   Ht 5' 11" (1.803 m)   Wt (!) 143 kg (316 lb)   SpO2 98%   BMI 44.07 kg/m²   /70       Physical Exam  Constitutional:       Appearance: Normal appearance. Cardiovascular:      Heart sounds: Normal heart sounds. Abdominal:      Palpations: Abdomen is soft. Neurological:      Mental Status: He is oriented to person, place, and time. Psychiatric:         Mood and Affect: Mood normal.           No results found for this or any previous visit (from the past 1008 hour(s)). Assessment/Plan:    No problem-specific Assessment & Plan notes found for this encounter. Problem List Items Addressed This Visit          Endocrine    Hypothyroidism     TSH is at goal.  Continue levothyroxine 112 micrograms. Relevant Medications    levothyroxine 112 mcg tablet    metoprolol succinate (TOPROL-XL) 25 mg 24 hr tablet    metoprolol succinate (TOPROL-XL) 50 mg 24 hr tablet    Other Relevant Orders    TSH, 3rd generation with Free T4 reflex       Cardiovascular and Mediastinum    Benign essential hypertension    Relevant Medications    metoprolol succinate (TOPROL-XL) 25 mg 24 hr tablet    metoprolol succinate (TOPROL-XL) 50 mg 24 hr tablet    Other Relevant Orders    Comprehensive metabolic panel       Other    Hyperlipidemia     LDL is at goal.  Continue statin. Continue monitoring.          Relevant Medications    rosuvastatin (CRESTOR) 10 MG tablet    Other Relevant Orders    Comprehensive metabolic panel    Lipid panel     Other Visit Diagnoses       Encounter for immunization    -  Primary

## 2023-11-28 ENCOUNTER — TELEPHONE (OUTPATIENT)
Age: 53
End: 2023-11-28

## 2023-11-28 NOTE — TELEPHONE ENCOUNTER
Scheduled date of colonoscopy (as of today):0216/2024  Physician performing colonoscopy:DR Shyam Napoles  Location of colonoscopy:  [address]  Bowel prep reviewed with patient:miralax/dul  Instructions reviewed with patient by:sent via my chart   Clearances: n/a

## 2023-11-28 NOTE — TELEPHONE ENCOUNTER
11/28/23  Screened by: Le Ozuna    Referring Provider     Pre- Screening: There is no height or weight on file to calculate BMI. Has patient been referred for a routine screening Colonoscopy? yes  Is the patient between 43-73 years old? yes      Previous Colonoscopy yes   If yes:    Date: 2020    Facility:     Reason:       SCHEDULING STAFF: If the patient is between 39yrs-51yrs, please advise patient to confirm benefits/coverage with their insurance company for a routine screening colonoscopy, some insurance carriers will only cover at [facility] or [facility]. If the patient is over 66years old, please schedule an office visit. Does the patient want to see a Gastroenterologist prior to their procedure OR are they having any GI symptoms? no    Has the patient been hospitalized or had abdominal surgery in the past 6 months? no    Does the patient use supplemental oxygen? no    Does the patient take Coumadin, Lovenox, Plavix, Elliquis, Xarelto, or other blood thinning medication? no    Has the patient had a stroke, cardiac event, or stent placed in the past year? no    SCHEDULING STAFF: If patient answers NO to above questions, then schedule procedure. If patient answers YES to above questions, then schedule office appointment. Patient passed OA  If patient is between 45yrs - 49yrs, please advise patient that we will have to confirm benefits & coverage with their insurance company for a routine screening colonoscopy.

## 2023-12-11 ENCOUNTER — TELEPHONE (OUTPATIENT)
Dept: GASTROENTEROLOGY | Facility: AMBULARY SURGERY CENTER | Age: 53
End: 2023-12-11

## 2023-12-11 NOTE — TELEPHONE ENCOUNTER
Spoke to patient directly. Patient aware of new date for colonoscopy at [facility] [employer] for March 4th, 2023. Mailing prep instructions to patient since he doesn't use my chart.

## 2024-02-26 ENCOUNTER — TELEPHONE (OUTPATIENT)
Age: 54
End: 2024-02-26

## 2024-04-03 ENCOUNTER — OFFICE VISIT (OUTPATIENT)
Dept: CARDIOLOGY CLINIC | Facility: CLINIC | Age: 54
End: 2024-04-03
Payer: COMMERCIAL

## 2024-04-03 VITALS
HEART RATE: 71 BPM | WEIGHT: 314.5 LBS | BODY MASS INDEX: 43.86 KG/M2 | SYSTOLIC BLOOD PRESSURE: 126 MMHG | OXYGEN SATURATION: 98 % | DIASTOLIC BLOOD PRESSURE: 70 MMHG

## 2024-04-03 DIAGNOSIS — Z82.49 FAMILY HISTORY OF PREMATURE CAD: ICD-10-CM

## 2024-04-03 DIAGNOSIS — R09.89 DECREASED CAROTID PULSE: ICD-10-CM

## 2024-04-03 DIAGNOSIS — I10 BENIGN ESSENTIAL HYPERTENSION: Primary | ICD-10-CM

## 2024-04-03 PROCEDURE — 93000 ELECTROCARDIOGRAM COMPLETE: CPT | Performed by: INTERNAL MEDICINE

## 2024-04-03 PROCEDURE — 99214 OFFICE O/P EST MOD 30 MIN: CPT | Performed by: INTERNAL MEDICINE

## 2024-04-03 NOTE — PROGRESS NOTES
Cardiology Follow Up    Evin Bran  1970  [id number]  [facility]  [address]  [address]  [facility] [address] [phone]    1. Benign essential hypertension  POCT ECG    VAS carotid complete study      2. Decreased carotid pulse  VAS carotid complete study    Stress test only, exercise      3. Family history of premature CAD  Stress test only, exercise          Diagnoses and all orders for this visit:    Benign essential hypertension  -     POCT ECG  -     VAS carotid complete study; Future    Decreased carotid pulse  -     VAS carotid complete study; Future  -     Stress test only, exercise; Future    Family history of premature CAD  -     Stress test only, exercise; Future      I had the pleasure of seeing Evin Bran for a follow up visit.     INTERVAL HISTORY: none    History of the presenting illness, Discussion/Summary and My Plan are as follows:::     is a pleasant 53-year-old gentleman with longstanding hypertension, mild dyslipidemia-on a statin, no diabetes, no tobacco use, minimal alcohol use, no drug use, who had originally seen Dr. Bansal for a basic risk evaluation.    He had  lumbar spine surgery-fusion of L3-L5 after which his mobility has been much better.  He continues to work as a , gets about 3 miles of walking through the day through his line of work but spends close to 3 hours commuting.  No cardiac symptoms or limitations.    He does have a family history of premature vascular disease-father had coronary bypass grafting and strokes, beginning in his late 40s, was also a heavy smoker and a diabetic, mother is without any cardiac issues.  One of his sisters needed a pacemaker at 57, was also a smoker.    In addition reviewing his records, he had a stress test in 2019 that did not show any ischemia but did show polymorphic PVCs.  At that point he was on metoprolol  although I do not know if he took it on the morning of the test.    Exam is unremarkable except for low volume carotids.    Plan:    Family history of premature vascular disease: Will check an exercise treadmill stress test, previously in 2019 had polymorphic PVCs but without ischemic ST segment abnormalities.    Low volume carotids: Father also had vascular dementia, will check carotid Dopplers as well.    Hypertension: Controlled    Dyslipidemia: Now on a statin, reasonable, now controlled  [facility]-November 2023: Total cholesterol 134, triglycerides 116, HDL 34, LDL 77 and non-    Follow up in 12 months     Latest Reference Range & Units 02/23/19 07:01 09/09/19 06:54 03/16/20 06:47 07/17/20 07:21 04/30/21 06:56 08/27/21 09:17   Cholesterol 100 - 199 mg/dL 177 173 190 190 195 173   Triglycerides 0 - 149 mg/dL 138 111 160 (H) 102 104 88   HDL >39 mg/dL 33 (L) 33 (L) 37 (L) 35 (L) 38 (L) 43   LDL Calculated 0 - 99 mg/dL 116 (H) 118 (H) 121 (H) 135 (H) 138 (H) 114 (H)   VLDL Cholesterol Gil 5 - 40 mg/dL 28 22 32 20 19 16   (H): Data is abnormally high  (L): Data is abnormally low   Latest Reference Range & Units 08/20/22 00:00 08/20/22 08:22   Hemoglobin A1C <5.7 % 5.1 (E) 5.1 (E)   (E): External lab result     Latest Reference Range & Units 05/05/23 07:30 11/11/23 08:44   BUN 7 - 28 mg/dL 21 (E) 19 (E)   Creatinine 0.53 - 1.30 mg/dL 0.87 (E) 0.76 (E)   (E): External lab result    Patient Active Problem List   Diagnosis    Abnormal blood sugar    Benign essential hypertension    Dyslipidemia    Hypothyroidism    Class 3 severe obesity due to excess calories with serious comorbidity in adult (HCC)    Hyperlipidemia    Anxiety    Chronic bilateral low back pain with bilateral sciatica    Sleep apnea    Chronic pain syndrome    Lumbar radiculopathy    Lumbar degenerative disc disease    Preoperative clearance    Fusion of lumbar spine    Family history of premature CAD    Decreased carotid pulse     Past Medical  History:   Diagnosis Date    Disease of thyroid gland     Hypertension      Social History     Socioeconomic History    Marital status: Single     Spouse name: Not on file    Number of children: Not on file    Years of education: Not on file    Highest education level: Not on file   Occupational History    Not on file   Tobacco Use    Smoking status: Never    Smokeless tobacco: Never    Tobacco comments:     AT RISK FROM PASSIVE SMOKING    Vaping Use    Vaping status: Never Used   Substance and Sexual Activity    Alcohol use: Yes     Alcohol/week: 0.0 standard drinks of alcohol     Comment: Social. Lay 1 or 2 drinks on the weekend.    Drug use: No    Sexual activity: Not Currently   Other Topics Concern    Not on file   Social History Narrative    Not on file     Social Determinants of Health     Financial Resource Strain: Low Risk  (6/6/2023)    Received from [facility]    Overall Financial Resource Strain (CARDIA)     Difficulty of Paying Living Expenses: Not very hard   Food Insecurity: No Food Insecurity (6/6/2023)    Received from [facility]    Hunger Vital Sign     Worried About Running Out of Food in the Last Year: Never true     Ran Out of Food in the Last Year: Never true   Transportation Needs: No Transportation Needs (6/6/2023)    Received from [facility]    PRAPARE - Transportation     Lack of Transportation (Medical): No     Lack of Transportation (Non-Medical): No   Physical Activity: Not on file   Stress: Not on file   Social Connections: Not on file   Intimate Partner Violence: Not At Risk (6/6/2023)    Received from [facility]    Humiliation, Afraid, Rape, and Kick questionnaire     Fear of Current or Ex-Partner: No     Emotionally Abused: No     Physically Abused: No     Sexually Abused: No   Housing Stability: Low Risk  (6/6/2023)    Received from [facility]    Housing Stability Vital Sign     Unable to Pay  for Housing in the Last Year: No     Number of Places Lived in the Last Year: 1     Unstable Housing in the Last Year: No      Family History   Problem Relation Age of Onset    Emphysema Mother     Bone cancer Mother     Breast cancer Mother     Lung cancer Mother     COPD Mother     Dementia Father     Stroke Father     Hyperlipidemia Father     Hypertension Father     Diabetes type II Father     Diabetes Father     Thyroid cancer Sister      Past Surgical History:   Procedure Laterality Date    EPIDURAL BLOCK INJECTION N/A 9/3/2021    Procedure: L4 L5 lumbar epidural steroid injection ([id number]);  Surgeon: Linda Arvizu MD;  Location: [facility] MAIN OR;  Service: Pain Management     EPIDURAL BLOCK INJECTION N/A 11/24/2021    Procedure: L4 L5 lumbar epidural steroid injection ([id number]);  Surgeon: Linda Arvizu MD;  Location: [facility] MAIN OR;  Service: Pain Management     EPIDURAL BLOCK INJECTION N/A 3/4/2022    Procedure: L4 L5 lumbar epidural steroid injection ([id number]);  Surgeon: Linda Arvizu MD;  Location: [facility] MAIN OR;  Service: Pain Management     NO PAST SURGERIES      MA INJECT SI JOINT ARTHRGRPHY&/ANES/STEROID W/LAWSON Bilateral 8/13/2021    Procedure: SACROILIAC JOINT INJECTION ([id number]);  Surgeon: Linda Arvizu MD;  Location: [facility] MAIN OR;  Service: Pain Management        Current Outpatient Medications:     levothyroxine 112 mcg tablet, Take 1 tablet (112 mcg total) by mouth daily in the early morning, Disp: 90 tablet, Rfl: 1    metoprolol succinate (TOPROL-XL) 25 mg 24 hr tablet, Take 1 tablet (25 mg total) by mouth daily, Disp: 90 tablet, Rfl: 1    metoprolol succinate (TOPROL-XL) 50 mg 24 hr tablet, Take 1 tablet (50 mg total) by mouth daily, Disp: 90 tablet, Rfl: 1    rosuvastatin (CRESTOR) 10 MG tablet, Take 1 tablet (10 mg total) by mouth daily, Disp: 90 tablet, Rfl: 1    tamsulosin (FLOMAX) 0.4 mg, TAKE 1 CAPSULE BY MOUTH NIGHTLY, Disp: , Rfl:     alfuzosin (UROXATRAL) 10 mg 24 hr tablet, Take 10 mg by  mouth daily (Patient not taking: Reported on 1/19/2023), Disp: , Rfl:     ALPRAZolam (XANAX) 0.25 mg tablet, Take 1 tablet (0.25 mg total) by mouth daily at bedtime as needed for anxiety (Patient not taking: Reported on 1/19/2023), Disp: 30 tablet, Rfl: 0    benzonatate (TESSALON PERLES) 100 mg capsule, Take 1 capsule (100 mg total) by mouth 3 (three) times a day as needed for cough (Patient not taking: Reported on 9/7/2022), Disp: 20 capsule, Rfl: 0    fluticasone (FLONASE) 50 mcg/act nasal spray, USE 2 SPRAY(S) IN EACH NOSTRIL ONCE DAILY (Patient not taking: Reported on 4/3/2024), Disp: , Rfl:     guaiFENesin (MUCINEX) 600 mg 12 hr tablet, Take 2 tablets (1,200 mg total) by mouth every 12 (twelve) hours (Patient not taking: Reported on 9/7/2022), Disp: 60 tablet, Rfl: 0    meloxicam (MOBIC) 15 mg tablet, TAKE 1 TABLET BY MOUTH ONCE DAILY PLEASE TAKE WITH FOOD DO NOT TAKE WITH OTHER NSAIDS (Patient not taking: Reported on 4/3/2024), Disp: , Rfl:     naproxen (NAPROSYN) 500 mg tablet, Take 1 tablet (500 mg total) by mouth 2 (two) times a day as needed (pain (with food)) (Patient not taking: Reported on 1/19/2023), Disp: 60 tablet, Rfl: 2    sertraline (ZOLOFT) 25 mg tablet, Take 1 tablet (25 mg total) by mouth daily (Patient not taking: Reported on 4/3/2024), Disp: 30 tablet, Rfl: 1    tadalafil (CIALIS) 5 MG tablet, Take 5 mg by mouth (Patient not taking: Reported on 1/19/2023), Disp: , Rfl:     topiramate (TOPAMAX) 100 mg tablet, Take 1 tablet (100 mg total) by mouth 2 (two) times a day TAKE 1 TABLET BY MOUTH IN THE MORNING AND 2 AT NIGHT (Patient not taking: Reported on 1/19/2023), Disp: 60 tablet, Rfl: 2  No Known Allergies    Imaging: No results found.    Review of Systems:  Review of Systems   Constitutional: Negative.    HENT: Negative.     Respiratory: Negative.     Cardiovascular: Negative.    Musculoskeletal:  Positive for back pain.       Physical Exam:  /70   Pulse 71   Wt (!) 143 kg (314 lb 8  "oz)   SpO2 98%   BMI 43.86 kg/m²   Physical Exam  Constitutional:       General: He is not in acute distress.     Appearance: He is not ill-appearing, toxic-appearing or diaphoretic.   Neck:      Vascular: No carotid bruit.   Cardiovascular:      Rate and Rhythm: Normal rate and regular rhythm.      Heart sounds: No murmur heard.     No friction rub. No gallop.   Pulmonary:      Effort: Pulmonary effort is normal. No respiratory distress.      Breath sounds: No stridor. No wheezing or rhonchi.   Abdominal:      General: Abdomen is flat. There is no distension.      Palpations: There is no mass.      Tenderness: There is no abdominal tenderness.      Hernia: No hernia is present.   Musculoskeletal:         General: No swelling, tenderness, deformity or signs of injury. Normal range of motion.      Cervical back: Normal range of motion. No rigidity or tenderness.   Lymphadenopathy:      Cervical: No cervical adenopathy.   Neurological:      Mental Status: He is alert.         This note was completed in part utilizing [employer] direct voice recognition software.   Grammatical errors, random word insertion, spelling mistakes, occasional wrong word or \"sound-alike\" substitutions and incomplete sentences may be an occasional consequence of the system secondary to software limitations, ambient noise and hardware issues. At the time of dictation, efforts were made to edit, clarify and /or correct errors.  Please read the chart carefully and recognize, using context, where substitutions have occurred.  If you have any questions or concerns about the context, text or information contained within the body of this dictation, please contact myself, the provider, for further clarification.  "

## 2024-05-10 LAB
ALBUMIN SERPL-MCNC: 3.9 G/DL (ref 3.5–5.7)
ALP SERPL-CCNC: 68 U/L (ref 35–120)
ALT SERPL-CCNC: 36 U/L
ANION GAP SERPL CALCULATED.3IONS-SCNC: 8 MMOL/L (ref 3–11)
AST SERPL-CCNC: 22 U/L
BILIRUB SERPL-MCNC: 0.7 MG/DL (ref 0.2–1)
BUN SERPL-MCNC: 17 MG/DL (ref 7–28)
CALCIUM SERPL-MCNC: 8.8 MG/DL (ref 8.5–10.1)
CHLORIDE SERPL-SCNC: 104 MMOL/L (ref 100–109)
CHOLEST SERPL-MCNC: 119 MG/DL
CHOLEST/HDLC SERPL: 3.6 {RATIO}
CO2 SERPL-SCNC: 28 MMOL/L (ref 21–31)
CREAT SERPL-MCNC: 0.83 MG/DL (ref 0.53–1.3)
CYTOLOGY CMNT CVX/VAG CYTO-IMP: NORMAL
GFR/BSA.PRED SERPLBLD CYS-BASED-ARV: 103 ML/MIN/{1.73_M2}
GLUCOSE SERPL-MCNC: 89 MG/DL (ref 65–99)
HDLC SERPL-MCNC: 33 MG/DL (ref 23–92)
LDLC SERPL CALC-MCNC: 73 MG/DL
NONHDLC SERPL-MCNC: 86 MG/DL
POTASSIUM SERPL-SCNC: 4.6 MMOL/L (ref 3.5–5.2)
PROT SERPL-MCNC: 6.6 G/DL (ref 6.3–8.3)
SODIUM SERPL-SCNC: 140 MMOL/L (ref 135–145)
TRIGL SERPL-MCNC: 66 MG/DL
TSH SERPL-ACNC: 2.98 UIU/ML (ref 0.45–5.33)

## 2024-05-16 ENCOUNTER — OFFICE VISIT (OUTPATIENT)
Dept: FAMILY MEDICINE CLINIC | Facility: CLINIC | Age: 54
End: 2024-05-16

## 2024-05-16 VITALS
DIASTOLIC BLOOD PRESSURE: 80 MMHG | HEIGHT: 71 IN | WEIGHT: 315 LBS | HEART RATE: 71 BPM | BODY MASS INDEX: 44.1 KG/M2 | OXYGEN SATURATION: 98 % | SYSTOLIC BLOOD PRESSURE: 140 MMHG

## 2024-05-16 DIAGNOSIS — Z00.00 PREVENTATIVE HEALTH CARE: ICD-10-CM

## 2024-05-16 DIAGNOSIS — E03.9 HYPOTHYROIDISM, UNSPECIFIED TYPE: ICD-10-CM

## 2024-05-16 DIAGNOSIS — F41.9 ANXIETY: Primary | ICD-10-CM

## 2024-05-16 DIAGNOSIS — E78.5 HYPERLIPIDEMIA, UNSPECIFIED HYPERLIPIDEMIA TYPE: ICD-10-CM

## 2024-05-16 DIAGNOSIS — I10 BENIGN ESSENTIAL HYPERTENSION: ICD-10-CM

## 2024-05-16 RX ORDER — METOPROLOL SUCCINATE 25 MG/1
25 TABLET, EXTENDED RELEASE ORAL DAILY
Qty: 90 TABLET | Refills: 1 | Status: SHIPPED | OUTPATIENT
Start: 2024-05-16

## 2024-05-16 RX ORDER — ALPRAZOLAM 0.25 MG/1
0.25 TABLET ORAL
Qty: 30 TABLET | Refills: 0 | Status: SHIPPED | OUTPATIENT
Start: 2024-05-16

## 2024-05-16 RX ORDER — LEVOTHYROXINE SODIUM 112 UG/1
112 TABLET ORAL
Qty: 90 TABLET | Refills: 1 | Status: SHIPPED | OUTPATIENT
Start: 2024-05-16

## 2024-05-16 RX ORDER — ROSUVASTATIN CALCIUM 10 MG/1
10 TABLET, COATED ORAL DAILY
Qty: 90 TABLET | Refills: 1 | Status: SHIPPED | OUTPATIENT
Start: 2024-05-16

## 2024-05-16 RX ORDER — METOPROLOL SUCCINATE 50 MG/1
50 TABLET, EXTENDED RELEASE ORAL DAILY
Qty: 90 TABLET | Refills: 1 | Status: SHIPPED | OUTPATIENT
Start: 2024-05-16

## 2024-05-16 RX ORDER — SERTRALINE HYDROCHLORIDE 25 MG/1
25 TABLET, FILM COATED ORAL DAILY
Qty: 30 TABLET | Refills: 1 | Status: SHIPPED | OUTPATIENT
Start: 2024-05-16 | End: 2024-11-12

## 2024-05-16 NOTE — PROGRESS NOTES
Subjective:      Patient ID: Evin Bran is a 53 y.o. male.    HPI    Patient is here for routine 6-month follow-up.  Has history of hypertension, hypothyroidism, hyperlipidemia.  Metabolic labs reviewed with patient today.  His fasting blood sugar, TSH,  and LDL are at goal.  Patient is currently on metoprolol XL 75 mg daily, Crestor 10 mg, levothyroxine 112 mcg.  Patient tolerating all medications without any side effects.  Patient noted to have elevated blood pressure today.  Reports symptoms of worsening anxiety for the past few days.  Patient currently not on any anxiety medication.  Has been trying to lose weight.  Has lost at least 20 pounds in the past 6 months with diet and exercise.  His current BMI is 44.4.  Patient is very disappointed with his weight and is interested in starting weight loss medication.    Past Medical History:   Diagnosis Date    Disease of thyroid gland     Hypertension        Family History   Problem Relation Age of Onset    Emphysema Mother     Bone cancer Mother     Breast cancer Mother     Lung cancer Mother     COPD Mother     Dementia Father     Stroke Father     Hyperlipidemia Father     Hypertension Father     Diabetes type II Father     Diabetes Father     Thyroid cancer Sister        Past Surgical History:   Procedure Laterality Date    EPIDURAL BLOCK INJECTION N/A 9/3/2021    Procedure: L4 L5 lumbar epidural steroid injection (12447);  Surgeon: Linda Arvizu MD;  Location: [facility] MAIN OR;  Service: Pain Management     EPIDURAL BLOCK INJECTION N/A 11/24/2021    Procedure: L4 L5 lumbar epidural steroid injection ([id number]);  Surgeon: Linda Arvizu MD;  Location: [facility] MAIN OR;  Service: Pain Management     EPIDURAL BLOCK INJECTION N/A 3/4/2022    Procedure: L4 L5 lumbar epidural steroid injection ([id number]);  Surgeon: Linda Arvizu MD;  Location: [facility] MAIN OR;  Service: Pain Management     NO PAST SURGERIES      [place] INJECT SI JOINT ARTHRGRPHY&/ANES/STEROID W/LAWSON Bilateral  8/13/2021    Procedure: SACROILIAC JOINT INJECTION ([id number]);  Surgeon: Linda Arvizu MD;  Location: [facility] MAIN OR;  Service: Pain Management         reports that he has never smoked. He has never used smokeless tobacco. He reports current alcohol use. He reports that he does not use drugs.      Current Outpatient Medications:     levothyroxine 112 mcg tablet, Take 1 tablet (112 mcg total) by mouth daily in the early morning, Disp: 90 tablet, Rfl: 1    metoprolol succinate (TOPROL-XL) 25 mg 24 hr tablet, Take 1 tablet (25 mg total) by mouth daily, Disp: 90 tablet, Rfl: 1    metoprolol succinate (TOPROL-XL) 50 mg 24 hr tablet, Take 1 tablet (50 mg total) by mouth daily, Disp: 90 tablet, Rfl: 1    rosuvastatin (CRESTOR) 10 MG tablet, Take 1 tablet (10 mg total) by mouth daily, Disp: 90 tablet, Rfl: 1    sertraline (ZOLOFT) 25 mg tablet, Take 1 tablet (25 mg total) by mouth daily, Disp: 30 tablet, Rfl: 1    tamsulosin (FLOMAX) 0.4 mg, TAKE 1 CAPSULE BY MOUTH NIGHTLY, Disp: , Rfl:     alfuzosin (UROXATRAL) 10 mg 24 hr tablet, Take 10 mg by mouth daily (Patient not taking: Reported on 1/19/2023), Disp: , Rfl:     ALPRAZolam (XANAX) 0.25 mg tablet, Take 1 tablet (0.25 mg total) by mouth daily at bedtime as needed for anxiety (Patient not taking: Reported on 1/19/2023), Disp: 30 tablet, Rfl: 0    benzonatate (TESSALON PERLES) 100 mg capsule, Take 1 capsule (100 mg total) by mouth 3 (three) times a day as needed for cough (Patient not taking: Reported on 9/7/2022), Disp: 20 capsule, Rfl: 0    fluticasone (FLONASE) 50 mcg/act nasal spray, USE 2 SPRAY(S) IN EACH NOSTRIL ONCE DAILY (Patient not taking: Reported on 4/3/2024), Disp: , Rfl:     guaiFENesin (MUCINEX) 600 mg 12 hr tablet, Take 2 tablets (1,200 mg total) by mouth every 12 (twelve) hours (Patient not taking: Reported on 9/7/2022), Disp: 60 tablet, Rfl: 0    meloxicam (MOBIC) 15 mg tablet, TAKE 1 TABLET BY MOUTH ONCE DAILY PLEASE TAKE WITH FOOD DO NOT TAKE WITH OTHER  "NSAIDS (Patient not taking: Reported on 4/3/2024), Disp: , Rfl:     naproxen (NAPROSYN) 500 mg tablet, Take 1 tablet (500 mg total) by mouth 2 (two) times a day as needed (pain (with food)) (Patient not taking: Reported on 1/19/2023), Disp: 60 tablet, Rfl: 2    tadalafil (CIALIS) 5 MG tablet, Take 5 mg by mouth (Patient not taking: Reported on 1/19/2023), Disp: , Rfl:     The following portions of the patient's history were reviewed and updated as appropriate: allergies, current medications, past family history, past medical history, past social history, past surgical history and problem list.    Review of Systems   Constitutional: Negative.    Respiratory: Negative.     Cardiovascular: Negative.            Objective:    /80   Pulse 71   Ht 5' 11\" (1.803 m)   Wt (!) 145 kg (319 lb)   SpO2 98%   BMI 44.49 kg/m²      Physical Exam  Constitutional:       Appearance: Normal appearance.   Cardiovascular:      Heart sounds: Normal heart sounds.   Pulmonary:      Breath sounds: Normal breath sounds.           Recent Results (from the past 1008 hour(s))   Lipid panel    Collection Time: 05/10/24  8:02 AM   Result Value Ref Range    Cholesterol, Total 119 <200 mg/dL    Triglycerides 66 <150 mg/dL    HDL Cholesterol 33 23 - 92 mg/dL    Non HDL Chol. (LDL+VLDL) 86 <160 mg/dL    LDL Calculated 73 <130 mg/dL    Chol HDLC Ratio 3.6    Comprehensive metabolic panel    Collection Time: 05/10/24  8:02 AM   Result Value Ref Range    Glucose, Random 89 65 - 99 mg/dL    BUN 17 7 - 28 mg/dL    Creatinine 0.83 0.53 - 1.30 mg/dL    Sodium 140 135 - 145 mmol/L    Potassium 4.6 3.5 - 5.2 mmol/L    Chloride 104 100 - 109 mmol/L    CO2 28 21 - 31 mmol/L    Calcium 8.8 8.5 - 10.1 mg/dL    Alkaline Phosphatase 68 35 - 120 U/L    Albumin 3.9 3.5 - 5.7 g/dL    TOTAL BILIRUBIN 0.7 0.2 - 1.0 mg/dL    Protein, Total 6.6 6.3 - 8.3 g/dL    AST 22 <41 U/L    ALT 36 <56 U/L    ANION GAP 8 3 - 11    eGFR 103 >59    Comment (Note)    TSH, 3rd " generation with Free T4 reflex    Collection Time: 05/10/24  8:02 AM   Result Value Ref Range    TSH 2.98 0.45 - 5.33 uIU/mL       Assessment/Plan:    No problem-specific Assessment & Plan notes found for this encounter.           Problem List Items Addressed This Visit          Cardiovascular and Mediastinum    Benign essential hypertension     Patient noted to have borderline high blood pressure since he stopped hydrochlorothiazide.  Patient was concerned about the risk of cancer with being on hydrochlorothiazide.  He is currently on metoprolol XL 70 mg.  Advised to increase the dose to 100 mg, if the blood pressure is above 140/90.  Patient prefers to continue monitoring his blood pressure at home.          Relevant Medications    metoprolol succinate (TOPROL-XL) 25 mg 24 hr tablet    metoprolol succinate (TOPROL-XL) 50 mg 24 hr tablet       Endocrine    Hypothyroidism     TSH is at goal.  Continue levothyroxine 112 micrograms.          Relevant Medications    levothyroxine 112 mcg tablet    metoprolol succinate (TOPROL-XL) 25 mg 24 hr tablet    metoprolol succinate (TOPROL-XL) 50 mg 24 hr tablet    Other Relevant Orders    TSH, 3rd generation with Free T4 reflex       Behavioral Health    Anxiety - Primary     Recent worsening of symptoms.  Started on low-dose of Zoloft. Xanax to be taken only as needed.         Relevant Medications    sertraline (ZOLOFT) 25 mg tablet    ALPRAZolam (XANAX) 0.25 mg tablet       Other    Hyperlipidemia     LDL is at goal.  Continue statin.  Continue monitoring.         Relevant Medications    rosuvastatin (CRESTOR) 10 MG tablet    Other Relevant Orders    Lipid panel    Comprehensive metabolic panel    Preventative health care     Patient is up-to-date on prostate and colorectal cancer screening.  Declines Adacel today         BMI 40.0-44.9, adult (HCC)     Patient is interested in starting weight loss medication.  His sister has history of metastatic thyroid cancer, s/p  thyroidectomy.  I have advised him against the injectables.  Patient is willing to try phentermine however today his blood pressure is elevated so I have advised him to increase metoprolol to 100 and monitor his blood pressure.  Patient will call back after 2 to 3 weeks of monitoring blood pressure and if it is normal then phentermine can be sent to his pharmacy.          I have spent a total time of 40 minutes on 05/16/24 in caring for this patient including Diagnostic results, Prognosis, Risks and benefits of tx options, Instructions for management, Patient and family education, Importance of tx compliance, Risk factor reductions, Impressions, Counseling / Coordination of care, Documenting in the medical record, Reviewing / ordering tests, medicine, procedures  , and Obtaining or reviewing history  .

## 2024-05-16 NOTE — ASSESSMENT & PLAN NOTE
Patient is interested in starting weight loss medication.  His sister has history of metastatic thyroid cancer, s/p thyroidectomy.  I have advised him against the injectables.  Patient is willing to try phentermine however today his blood pressure is elevated so I have advised him to increase metoprolol to 100 and monitor his blood pressure.  Patient will call back after 2 to 3 weeks of monitoring blood pressure and if it is normal then phentermine can be sent to his pharmacy.

## 2024-05-16 NOTE — ASSESSMENT & PLAN NOTE
OPERATIVE REPORT    PATIENT:   Mk Virk   1997       PREOPERATIVE DIAGNOSES/INDICATIONS: Upper GI bleeding. EV bleeding     POSTOPERATIVE DIAGNOSIS:   Duodenal ulcers, 1cm at bulb, clean base, no bleeding.   Diffuse mucosal damage of stomach with necrosis. Underlying PHG with multiple erosions and ulcers mainly at body and antrum, some oozing, but no high risk stigmata.   Likely 100cc blood clot in fundus, but no gastric varix in visible park of cardia/fundus.   Esophageal varices, mucosal damage, erosions, and evidence of necrosis. Previous treated EV at [address] [place] [address]. S/p 6 banding at all 4 quadrants.     PROCEDURE:  ESOPHAGOGASTRODUODENOSCOPY    PHYSICIAN:  Ijeoma Encarnacion MD. MPH.    ANESTHESIA:  Per anesthesiologist or ICU/ED team.     LOCATION: [facility]    CONSENT:  OBTAINED. The risks, benefits and alternatives of the procedure were discussed in details. The risks include and are not limited to bleeding, infection, perforation, missed lesions, and sedations risks (cardiopulmonary compromise and allergic reaction to medications).    DESCRIPTION: The patient presented to the procedure room.  After routine checkup was performed, patient was brought into the endoscopy suite.  Patient was placed on his left lateral decubitus position. A bite block was placed in patient's mouth. Patient was sedated by anesthesia.  Vital signs were monitored throughout the procedure.  Oxygenation support was provided throughout the procedure. Upper endoscope was inserted into patient's mouth and advanced to the second portion of the duodenum under direct visualization.      Once the site was reached and examined, the upper endoscope was withdrawn.  Retroflexion was made within the stomach.  The stomach was decompressed, scope was withdrawn and the procedure was terminated.    The patient tolerated the procedure well.  There were no immediate complications.    OPERATIVE FINDINGS:      Duodenal ulcers, 1cm at bulb, clean base, no  Patient noted to have borderline high blood pressure since he stopped hydrochlorothiazide.  Patient was concerned about the risk of cancer with being on hydrochlorothiazide.  He is currently on metoprolol XL 70 mg.  Advised to increase the dose to 100 mg, if the blood pressure is above 140/90.  Patient prefers to continue monitoring his blood pressure at home.    bleeding.   Diffuse mucosal damage of stomach with necrosis. Underlying PHG with multiple erosions and ulcers mainly at body and antrum, some oozing, but no high risk stigmata.   Likely 100cc blood clot in fundus, but no gastric varix in visible park of cardia/fundus.   Esophageal varices, mucosal damage, erosions, and evidence of necrosis. Previous treated EV at [facility]. S/p 6 banding at all 4 quadrants.     RECOMMENDATIONS:  No need to re insert [place].   Continue PPI iv infusion, octreotide, iv antibiotics.   NPO.   We will see the patient tmr morning.         This note has been transcribed with digital voice recognition software and although it has been reviewed may contain grammatical or word errors

## 2024-05-31 NOTE — ASSESSMENT & PLAN NOTE
TSH is at goal   Continue levothyroxine 100 micrograms X Size Of Lesion In Cm (Optional): 0 Other Procedure: intralesional kenalog Introduction Text (Please End With A Colon): Defer: Detail Level: Detailed

## 2024-06-14 ENCOUNTER — HOSPITAL ENCOUNTER (OUTPATIENT)
Dept: GASTROENTEROLOGY | Facility: HOSPITAL | Age: 54
Setting detail: OUTPATIENT SURGERY
End: 2024-06-14
Attending: INTERNAL MEDICINE
Payer: COMMERCIAL

## 2024-06-14 ENCOUNTER — ANESTHESIA (OUTPATIENT)
Dept: GASTROENTEROLOGY | Facility: HOSPITAL | Age: 54
End: 2024-06-14

## 2024-06-14 ENCOUNTER — ANESTHESIA EVENT (OUTPATIENT)
Dept: GASTROENTEROLOGY | Facility: HOSPITAL | Age: 54
End: 2024-06-14

## 2024-06-14 VITALS
SYSTOLIC BLOOD PRESSURE: 124 MMHG | RESPIRATION RATE: 20 BRPM | BODY MASS INDEX: 43.96 KG/M2 | HEART RATE: 68 BPM | WEIGHT: 314 LBS | DIASTOLIC BLOOD PRESSURE: 84 MMHG | OXYGEN SATURATION: 98 % | TEMPERATURE: 97.8 F | HEIGHT: 71 IN

## 2024-06-14 DIAGNOSIS — Z86.010 HISTORY OF COLON POLYPS: ICD-10-CM

## 2024-06-14 PROCEDURE — 45385 COLONOSCOPY W/LESION REMOVAL: CPT | Performed by: INTERNAL MEDICINE

## 2024-06-14 PROCEDURE — 88305 TISSUE EXAM BY PATHOLOGIST: CPT | Performed by: PATHOLOGY

## 2024-06-14 RX ORDER — PROPOFOL 10 MG/ML
INJECTION, EMULSION INTRAVENOUS CONTINUOUS PRN
Status: DISCONTINUED | OUTPATIENT
Start: 2024-06-14 | End: 2024-06-14

## 2024-06-14 RX ORDER — SODIUM CHLORIDE, SODIUM LACTATE, POTASSIUM CHLORIDE, CALCIUM CHLORIDE 600; 310; 30; 20 MG/100ML; MG/100ML; MG/100ML; MG/100ML
INJECTION, SOLUTION INTRAVENOUS CONTINUOUS PRN
Status: DISCONTINUED | OUTPATIENT
Start: 2024-06-14 | End: 2024-06-14

## 2024-06-14 RX ORDER — PROPOFOL 10 MG/ML
INJECTION, EMULSION INTRAVENOUS AS NEEDED
Status: DISCONTINUED | OUTPATIENT
Start: 2024-06-14 | End: 2024-06-14

## 2024-06-14 RX ADMIN — PROPOFOL 100 MCG/KG/MIN: 10 INJECTION, EMULSION INTRAVENOUS at 10:38

## 2024-06-14 RX ADMIN — SODIUM CHLORIDE, SODIUM LACTATE, POTASSIUM CHLORIDE, AND CALCIUM CHLORIDE: .6; .31; .03; .02 INJECTION, SOLUTION INTRAVENOUS at 09:40

## 2024-06-14 RX ADMIN — PROPOFOL 100 MG: 10 INJECTION, EMULSION INTRAVENOUS at 10:38

## 2024-06-14 NOTE — ANESTHESIA POSTPROCEDURE EVALUATION
Post-Op Assessment Note    CV Status:  Stable  Pain Score: 0    Pain management: adequate       Mental Status:  Alert and awake   Hydration Status:  Euvolemic   PONV Controlled:  Controlled   Airway Patency:  Patent     Post Op Vitals Reviewed: Yes    No anethesia notable event occurred.    Staff: Anesthesiologist, JAYLA               /76 (06/14/24 1053)    Temp      Pulse 68 (06/14/24 1053)   Resp 16 (06/14/24 1053)    SpO2 98 % (06/14/24 1053)

## 2024-06-14 NOTE — H&P
History and Physical - [facility]  Evin Bran 53 y.o. male MRN: [id number]        HPI: 53-year-old male with history of colon polyps.  Regular bowel movements.    Historical Information   Past Medical History:   Diagnosis Date    Colon polyp     Disease of thyroid gland     Hypertension      Past Surgical History:   Procedure Laterality Date    COLONOSCOPY      EPIDURAL BLOCK INJECTION N/A 09/03/2021    Procedure: L4 L5 lumbar epidural steroid injection ([id number]);  Surgeon: Linda Arvizu MD;  Location: [facility] MAIN OR;  Service: Pain Management     EPIDURAL BLOCK INJECTION N/A 11/24/2021    Procedure: L4 L5 lumbar epidural steroid injection ([id number]);  Surgeon: Linda Arvizu MD;  Location: [facility] MAIN OR;  Service: Pain Management     EPIDURAL BLOCK INJECTION N/A 03/04/2022    Procedure: L4 L5 lumbar epidural steroid injection ([id number]);  Surgeon: Linda Arvizu MD;  Location: [facility] MAIN OR;  Service: Pain Management     LUMBAR FUSION  06/01/2023    [place] INJECT SI JOINT ARTHRGRPHY&/ANES/STEROID W/LAWSON Bilateral 08/13/2021    Procedure: SACROILIAC JOINT INJECTION ([id number]);  Surgeon: Linda Arvizu MD;  Location: [facility] MAIN OR;  Service: Pain Management      Social History   Social History     Substance and Sexual Activity   Alcohol Use Yes    Comment: 1 or 2 drinks on the weekend.     Social History     Substance and Sexual Activity   Drug Use No     Social History     Tobacco Use   Smoking Status Never   Smokeless Tobacco Never   Tobacco Comments    AT RISK FROM PASSIVE SMOKING      Family History   Problem Relation Age of Onset    Emphysema Mother     Bone cancer Mother     Breast cancer Mother     Lung cancer Mother     COPD Mother     Dementia Father     Stroke Father     Hyperlipidemia Father     Hypertension Father     Diabetes type II Father     Diabetes Father     Thyroid cancer Sister        Meds/Allergies     Not in a hospital admission.    No Known Allergies    Objective     Blood pressure  "132/78, pulse 71, temperature 97.8 °F (36.6 °C), temperature source Temporal, resp. rate 18, height 5' 11\" (1.803 m), weight (!) 142 kg (314 lb), SpO2 98%.    Physical Exam:    Chest- CTA  Heart- RRR  Abdomen- NT/ND  Extremities- No edema    ASSESSMENT:     History of colon polyps    PLAN:    Colonoscopy              "

## 2024-06-14 NOTE — ANESTHESIA PREPROCEDURE EVALUATION
"\" 53-year-old gentleman with longstanding hypertension, mild dyslipidemia-on a statin, no diabetes, no tobacco use, minimal alcohol use, no drug use, who had originally seen Dr. Bansal for a basic risk evaluation.     He had  lumbar spine surgery-fusion of L3-L5 after which his mobility has been much better.  He continues to work as a , gets about 3 miles of walking through the day through his line of work but spends close to 3 hours commuting.  No cardiac symptoms or limitations.     He does have a family history of premature vascular disease-father had coronary bypass grafting and strokes, beginning in his late 40s, was also a heavy smoker and a diabetic, mother is without any cardiac issues.  One of his sisters needed a pacemaker at 57, was also a smoker.     In addition reviewing his records, he had a stress test in 2019 that did not show any ischemia but did show polymorphic PVCs.  At that point he was on metoprolol although I do not know if he took it on the morning of the test.     Exam is unremarkable except for low volume carotids.   \"    Procedure:  COLONOSCOPY    Relevant Problems   CARDIO   (+) Benign essential hypertension   (+) Hyperlipidemia      ENDO   (+) Hypothyroidism      MUSCULOSKELETAL   (+) Chronic bilateral low back pain with bilateral sciatica   (+) Lumbar degenerative disc disease      NEURO/PSYCH   (+) Anxiety   (+) Chronic bilateral low back pain with bilateral sciatica   (+) Chronic pain syndrome      PULMONARY   (+) Sleep apnea        Physical Exam    Airway    Mallampati score: II  TM Distance: >3 FB  Neck ROM: full     Dental   No notable dental hx     Cardiovascular  Rhythm: regular, Rate: normal    Pulmonary   No stridor    Other Findings        Anesthesia Plan  ASA Score- 3     Anesthesia Type- IV sedation with anesthesia with ASA Monitors.         Additional Monitors:     Airway Plan:            Plan Factors-    Chart reviewed. EKG reviewed.  Existing labs " reviewed. Patient summary reviewed.                  Induction- intravenous.    Postoperative Plan-     Perioperative Resuscitation Plan - Level 1 - Full Code.       Informed Consent- Anesthetic plan and risks discussed with patient.  I personally reviewed this patient with the CRNA. Discussed and agreed on the Anesthesia Plan with the CRNA..

## 2024-06-18 PROCEDURE — 88305 TISSUE EXAM BY PATHOLOGIST: CPT | Performed by: PATHOLOGY

## 2024-08-08 ENCOUNTER — HOSPITAL ENCOUNTER (OUTPATIENT)
Dept: NON INVASIVE DIAGNOSTICS | Facility: CLINIC | Age: 54
Discharge: HOME | End: 2024-08-08
Payer: COMMERCIAL

## 2024-08-08 DIAGNOSIS — R09.89 DECREASED CAROTID PULSE: ICD-10-CM

## 2024-08-08 DIAGNOSIS — I10 BENIGN ESSENTIAL HYPERTENSION: ICD-10-CM

## 2024-08-08 DIAGNOSIS — Z82.49 FAMILY HISTORY OF PREMATURE CAD: ICD-10-CM

## 2024-08-08 LAB
MAX HR PERCENT: 82 %
MAX HR: 136 BPM
RATE PRESSURE PRODUCT: NORMAL
SL CV STRESS RECOVERY BP: NORMAL MMHG
SL CV STRESS RECOVERY HR: 88 BPM
SL CV STRESS RECOVERY O2 SAT: 96 %
SL CV STRESS STAGE REACHED: 3
STRESS ANGINA INDEX: 0
STRESS BASELINE BP: NORMAL MMHG
STRESS BASELINE HR: 83 BPM
STRESS O2 SAT REST: 97 %
STRESS PEAK HR: 136 BPM
STRESS POST ESTIMATED WORKLOAD: 8.8 METS
STRESS POST EXERCISE DUR MIN: 7 MIN
STRESS POST EXERCISE DUR SEC: 12 SEC
STRESS POST O2 SAT PEAK: 97 %
STRESS POST PEAK BP: 198 MMHG

## 2024-08-08 PROCEDURE — 93880 EXTRACRANIAL BILAT STUDY: CPT

## 2024-08-08 PROCEDURE — 93880 EXTRACRANIAL BILAT STUDY: CPT | Performed by: SURGERY

## 2024-08-08 PROCEDURE — 93016 CV STRESS TEST SUPVJ ONLY: CPT | Performed by: INTERNAL MEDICINE

## 2024-08-08 PROCEDURE — 93017 CV STRESS TEST TRACING ONLY: CPT

## 2024-08-08 PROCEDURE — 93018 CV STRESS TEST I&R ONLY: CPT | Performed by: INTERNAL MEDICINE

## 2024-08-09 LAB
CHEST PAIN STATEMENT: NORMAL
MAX DIASTOLIC BP: 78 MMHG
MAX PREDICTED HEART RATE: 165 BPM
PROTOCOL NAME: NORMAL
REASON FOR TERMINATION: NORMAL
STRESS POST EXERCISE DUR MIN: 7 MIN
STRESS POST EXERCISE DUR SEC: 12 SEC
STRESS POST PEAK HR: 136 BPM
STRESS POST PEAK SYSTOLIC BP: 198 MMHG
TARGET HR FORMULA: NORMAL
TEST INDICATION: NORMAL

## 2024-08-15 ENCOUNTER — TELEPHONE (OUTPATIENT)
Dept: CARDIOLOGY CLINIC | Facility: CLINIC | Age: 54
End: 2024-08-15

## 2024-08-15 NOTE — TELEPHONE ENCOUNTER
----- Message -----  From: Jordy Liang MD  Sent: 8/11/2024   1:51 AM EDT  To: [facility]    Stress test was good, please let him know

## 2024-10-12 LAB
ALBUMIN SERPL-MCNC: 4 G/DL (ref 3.5–5.7)
ALP SERPL-CCNC: 72 U/L (ref 35–120)
ALT SERPL-CCNC: 40 U/L
ANION GAP SERPL CALCULATED.3IONS-SCNC: 8 MMOL/L (ref 3–11)
AST SERPL-CCNC: 22 U/L
BILIRUB SERPL-MCNC: 0.7 MG/DL (ref 0.2–1)
BUN SERPL-MCNC: 19 MG/DL (ref 7–28)
CALCIUM SERPL-MCNC: 8.6 MG/DL (ref 8.5–10.1)
CHLORIDE SERPL-SCNC: 104 MMOL/L (ref 100–109)
CHOLEST SERPL-MCNC: 147 MG/DL
CHOLEST/HDLC SERPL: 4.3 {RATIO}
CO2 SERPL-SCNC: 27 MMOL/L (ref 21–31)
CREAT SERPL-MCNC: 0.81 MG/DL (ref 0.53–1.3)
CYTOLOGY CMNT CVX/VAG CYTO-IMP: NORMAL
GFR/BSA.PRED SERPLBLD CYS-BASED-ARV: 104 ML/MIN/{1.73_M2}
GLUCOSE SERPL-MCNC: 92 MG/DL (ref 65–99)
HDLC SERPL-MCNC: 34 MG/DL (ref 23–92)
LDLC SERPL CALC-MCNC: 94 MG/DL
NONHDLC SERPL-MCNC: 113 MG/DL
POTASSIUM SERPL-SCNC: 4.2 MMOL/L (ref 3.5–5.2)
PROT SERPL-MCNC: 6.6 G/DL (ref 6.3–8.3)
SODIUM SERPL-SCNC: 139 MMOL/L (ref 135–145)
TRIGL SERPL-MCNC: 93 MG/DL
TSH SERPL-ACNC: 3.58 UIU/ML (ref 0.45–5.33)

## 2024-12-27 ENCOUNTER — TELEPHONE (OUTPATIENT)
Age: 54
End: 2024-12-27

## 2024-12-27 NOTE — TELEPHONE ENCOUNTER
Patient schedule an appt with Dr. Hammond he stated that he wanted to catch her before she leaves the practice. Patient scheduled for January 22 at 8am and was wondering if there is a cancellation list he can be placed in. Please call patient back.  Ely Kimbrough

## 2025-01-21 DIAGNOSIS — E03.9 HYPOTHYROIDISM, UNSPECIFIED TYPE: ICD-10-CM

## 2025-01-21 DIAGNOSIS — E78.5 HYPERLIPIDEMIA, UNSPECIFIED HYPERLIPIDEMIA TYPE: ICD-10-CM

## 2025-01-21 DIAGNOSIS — I10 BENIGN ESSENTIAL HYPERTENSION: ICD-10-CM

## 2025-01-21 NOTE — TELEPHONE ENCOUNTER
Pt. Would like Dr. Hammond to pls fill his medications before she leaves.  He is going to see Dr. Naranjo on 2/24 but will be out of medication before he sees him.

## 2025-01-22 RX ORDER — LEVOTHYROXINE SODIUM 112 UG/1
112 TABLET ORAL
Qty: 30 TABLET | Refills: 0 | Status: SHIPPED | OUTPATIENT
Start: 2025-01-22

## 2025-01-22 RX ORDER — METOPROLOL SUCCINATE 25 MG/1
25 TABLET, EXTENDED RELEASE ORAL DAILY
Qty: 30 TABLET | Refills: 0 | Status: SHIPPED | OUTPATIENT
Start: 2025-01-22

## 2025-01-22 RX ORDER — METOPROLOL SUCCINATE 50 MG/1
50 TABLET, EXTENDED RELEASE ORAL DAILY
Qty: 30 TABLET | Refills: 0 | Status: SHIPPED | OUTPATIENT
Start: 2025-01-22

## 2025-01-22 RX ORDER — ROSUVASTATIN CALCIUM 10 MG/1
10 TABLET, COATED ORAL DAILY
Qty: 30 TABLET | Refills: 0 | Status: SHIPPED | OUTPATIENT
Start: 2025-01-22

## 2025-02-24 ENCOUNTER — OFFICE VISIT (OUTPATIENT)
Dept: FAMILY MEDICINE CLINIC | Facility: CLINIC | Age: 55
End: 2025-02-24
Payer: COMMERCIAL

## 2025-02-24 VITALS
OXYGEN SATURATION: 98 % | DIASTOLIC BLOOD PRESSURE: 82 MMHG | BODY MASS INDEX: 44.1 KG/M2 | HEART RATE: 72 BPM | WEIGHT: 315 LBS | SYSTOLIC BLOOD PRESSURE: 122 MMHG | HEIGHT: 71 IN

## 2025-02-24 DIAGNOSIS — M54.41 CHRONIC BILATERAL LOW BACK PAIN WITH BILATERAL SCIATICA: ICD-10-CM

## 2025-02-24 DIAGNOSIS — G47.30 SLEEP APNEA, UNSPECIFIED TYPE: ICD-10-CM

## 2025-02-24 DIAGNOSIS — I10 BENIGN ESSENTIAL HYPERTENSION: ICD-10-CM

## 2025-02-24 DIAGNOSIS — E78.5 DYSLIPIDEMIA: ICD-10-CM

## 2025-02-24 DIAGNOSIS — R73.09 ABNORMAL BLOOD SUGAR: Primary | ICD-10-CM

## 2025-02-24 DIAGNOSIS — Z12.5 PROSTATE CANCER SCREENING: ICD-10-CM

## 2025-02-24 DIAGNOSIS — E03.9 HYPOTHYROIDISM, UNSPECIFIED TYPE: ICD-10-CM

## 2025-02-24 DIAGNOSIS — G89.29 CHRONIC BILATERAL LOW BACK PAIN WITH BILATERAL SCIATICA: ICD-10-CM

## 2025-02-24 DIAGNOSIS — E78.5 HYPERLIPIDEMIA, UNSPECIFIED HYPERLIPIDEMIA TYPE: ICD-10-CM

## 2025-02-24 DIAGNOSIS — F41.9 ANXIETY: ICD-10-CM

## 2025-02-24 DIAGNOSIS — M54.42 CHRONIC BILATERAL LOW BACK PAIN WITH BILATERAL SCIATICA: ICD-10-CM

## 2025-02-24 PROBLEM — R09.89 DECREASED CAROTID PULSE: Status: RESOLVED | Noted: 2024-04-03 | Resolved: 2025-02-24

## 2025-02-24 PROBLEM — Z01.818 PREOPERATIVE CLEARANCE: Status: RESOLVED | Noted: 2023-05-05 | Resolved: 2025-02-24

## 2025-02-24 PROCEDURE — 99215 OFFICE O/P EST HI 40 MIN: CPT | Performed by: FAMILY MEDICINE

## 2025-02-24 RX ORDER — LEVOTHYROXINE SODIUM 112 UG/1
112 TABLET ORAL
Qty: 90 TABLET | Refills: 1 | Status: SHIPPED | OUTPATIENT
Start: 2025-02-24

## 2025-02-24 RX ORDER — METOPROLOL SUCCINATE 25 MG/1
25 TABLET, EXTENDED RELEASE ORAL DAILY
Qty: 90 TABLET | Refills: 1 | Status: SHIPPED | OUTPATIENT
Start: 2025-02-24

## 2025-02-24 RX ORDER — METOPROLOL SUCCINATE 50 MG/1
50 TABLET, EXTENDED RELEASE ORAL DAILY
Qty: 90 TABLET | Refills: 1 | Status: SHIPPED | OUTPATIENT
Start: 2025-02-24

## 2025-02-24 RX ORDER — SERTRALINE HYDROCHLORIDE 25 MG/1
25 TABLET, FILM COATED ORAL DAILY
Qty: 90 TABLET | Refills: 1 | Status: SHIPPED | OUTPATIENT
Start: 2025-02-24 | End: 2025-08-23

## 2025-02-24 RX ORDER — ROSUVASTATIN CALCIUM 10 MG/1
10 TABLET, COATED ORAL DAILY
Qty: 90 TABLET | Refills: 1 | Status: SHIPPED | OUTPATIENT
Start: 2025-02-24

## 2025-02-24 NOTE — PROGRESS NOTES
Subjective:      Patient ID: Evin Bran is a 54 y.o. male.    54-year-old male with past medical history of hypertension, hyperlipidemia, DJD lumbar spine status post fusion who receives epidural corticosteroid injections from pain management presents as new patient to me as his prior PCP retired from medical practice.  History of hypothyroidism.  He did have labs completed in October which were reviewed.  Cholesterol was very well-controlled, thyroid function was normalized.  He does complain of increasing anxiety which she feels is related to his chronic back pain.  In the past he was on sertraline and as needed alprazolam.  Does have difficulty sleeping at night.  Noted snoring.  This does affect his ability to work during the day.  Somewhere in his chart was a diagnosis of sleep apnea.  Would like to go for sleep study which he has never gone for.  He was referred to sleep medicine but was never contacted        Past Medical History:   Diagnosis Date    Colon polyp     Disease of thyroid gland     Hypertension        Family History   Problem Relation Age of Onset    Emphysema Mother     Bone cancer Mother     Breast cancer Mother     Lung cancer Mother     COPD Mother     Dementia Father     Stroke Father     Hyperlipidemia Father     Hypertension Father     Diabetes type II Father     Diabetes Father     Thyroid cancer Sister        Past Surgical History:   Procedure Laterality Date    COLONOSCOPY      EPIDURAL BLOCK INJECTION N/A 09/03/2021    Procedure: L4 L5 lumbar epidural steroid injection (22659);  Surgeon: Linda Arvizu MD;  Location: [facility] MAIN OR;  Service: Pain Management     EPIDURAL BLOCK INJECTION N/A 11/24/2021    Procedure: L4 L5 lumbar epidural steroid injection (65390);  Surgeon: Linda Arvizu MD;  Location: [facility] MAIN OR;  Service: Pain Management     EPIDURAL BLOCK INJECTION N/A 03/04/2022    Procedure: L4 L5 lumbar epidural steroid injection (04668);  Surgeon: Linda Arvizu MD;   Location: [facility] MAIN OR;  Service: Pain Management     LUMBAR FUSION  06/01/2023    MN INJECT SI JOINT ARTHRGRPHY&/ANES/STEROID W/LAWSON Bilateral 08/13/2021    Procedure: SACROILIAC JOINT INJECTION (28901);  Surgeon: Linda Arvizu MD;  Location: [facility] MAIN OR;  Service: Pain Management         reports that he has never smoked. He has never used smokeless tobacco. He reports current alcohol use. He reports that he does not use drugs.      Current Outpatient Medications:     ALPRAZolam (XANAX) 0.25 mg tablet, Take 1 tablet (0.25 mg total) by mouth daily at bedtime as needed for anxiety, Disp: 30 tablet, Rfl: 0    levothyroxine 112 mcg tablet, Take 1 tablet (112 mcg total) by mouth daily in the early morning, Disp: 90 tablet, Rfl: 1    metoprolol succinate (TOPROL-XL) 25 mg 24 hr tablet, Take 1 tablet (25 mg total) by mouth daily, Disp: 90 tablet, Rfl: 1    metoprolol succinate (TOPROL-XL) 50 mg 24 hr tablet, Take 1 tablet (50 mg total) by mouth daily, Disp: 90 tablet, Rfl: 1    rosuvastatin (CRESTOR) 10 MG tablet, Take 1 tablet (10 mg total) by mouth daily, Disp: 90 tablet, Rfl: 1    sertraline (ZOLOFT) 25 mg tablet, Take 1 tablet (25 mg total) by mouth daily, Disp: 90 tablet, Rfl: 1    The following portions of the patient's history were reviewed and updated as appropriate: allergies, current medications, past family history, past medical history, past social history, past surgical history and problem list.    Review of Systems   Constitutional: Negative.    HENT: Negative.     Eyes: Negative.    Respiratory: Negative.     Cardiovascular: Negative.    Gastrointestinal: Negative.    Endocrine: Negative.    Genitourinary: Negative.    Musculoskeletal:  Positive for back pain.   Skin: Negative.    Allergic/Immunologic: Negative.    Neurological: Negative.    Hematological: Negative.    Psychiatric/Behavioral:  Positive for sleep disturbance (Snoring daytime somnolence). The patient is nervous/anxious.    All other  "systems reviewed and are negative.          Objective:    /82   Pulse 72   Ht 5' 11\" (1.803 m)   Wt (!) 147 kg (325 lb)   SpO2 98%   BMI 45.33 kg/m²      Physical Exam  Vitals and nursing note reviewed.   Constitutional:       General: He is not in acute distress.     Appearance: Normal appearance. He is well-developed. He is obese. He is not ill-appearing.   HENT:      Head: Normocephalic and atraumatic.      Right Ear: Tympanic membrane, ear canal and external ear normal.      Left Ear: Tympanic membrane, ear canal and external ear normal.      Nose: Nose normal.      Mouth/Throat:      Mouth: Mucous membranes are moist.   Eyes:      Extraocular Movements: Extraocular movements intact.      Conjunctiva/sclera: Conjunctivae normal.      Pupils: Pupils are equal, round, and reactive to light.   Cardiovascular:      Rate and Rhythm: Normal rate and regular rhythm.      Pulses: Normal pulses.      Heart sounds: Normal heart sounds. No murmur heard.  Pulmonary:      Effort: Pulmonary effort is normal.      Breath sounds: Normal breath sounds.   Abdominal:      General: Abdomen is flat. Bowel sounds are normal.      Palpations: Abdomen is soft.   Musculoskeletal:         General: Normal range of motion.      Cervical back: Normal range of motion and neck supple.   Skin:     General: Skin is warm and dry.   Neurological:      General: No focal deficit present.      Mental Status: He is alert and oriented to person, place, and time. Mental status is at baseline.   Psychiatric:         Mood and Affect: Mood normal.         Behavior: Behavior normal.         Thought Content: Thought content normal.         Judgment: Judgment normal.           Recent Results (from the past 20 weeks)   Comprehensive metabolic panel    Collection Time: 10/12/24  8:03 AM   Result Value Ref Range    Glucose, Random 92 65 - 99 mg/dL    BUN 19 7 - 28 mg/dL    Creatinine 0.81 0.53 - 1.30 mg/dL    Sodium 139 135 - 145 mmol/L    Potassium " 4.2 3.5 - 5.2 mmol/L    Chloride 104 100 - 109 mmol/L    CO2 27 21 - 31 mmol/L    Calcium 8.6 8.5 - 10.1 mg/dL    Alkaline Phosphatase 72 35 - 120 U/L    Albumin 4.0 3.5 - 5.7 g/dL    TOTAL BILIRUBIN 0.7 0.2 - 1.0 mg/dL    Protein, Total 6.6 6.3 - 8.3 g/dL    AST 22 <41 U/L    ALT 40 <56 U/L    ANION GAP 8 3 - 11    eGFR 104 >59    Comment (Note)    Lipid panel    Collection Time: 10/12/24  8:03 AM   Result Value Ref Range    Cholesterol, Total 147 <200 mg/dL    Triglycerides 93 <150 mg/dL    HDL Cholesterol 34 23 - 92 mg/dL    Non HDL Chol. (LDL+VLDL) 113 <160 mg/dL    LDL Calculated 94 <130 mg/dL    Chol HDLC Ratio 4.3    TSH, 3rd generation with Free T4 reflex    Collection Time: 10/12/24  8:03 AM   Result Value Ref Range    TSH 3.58 0.45 - 5.33 uIU/mL        Assessment/Plan:    Benign essential hypertension  Hypertension remains well-controlled on Toprol XL 75 mg once daily.  Continue same.  No need to change.  Weight loss would be beneficial.  Patient is aware of this however his insurance does not cover GLP-1 agonist.  With his history of anxiety and hypertension I would not put him on phentermine    Sleep apnea  Referral for home sleep study    Hypothyroidism  Well-controlled on levothyroxine 112 mcg once daily.  Continue same.  Refill provided.  Repeat thyroid function testing to be done in June.  Advised patient that he should be taking levothyroxine on its own on an empty stomach with a full glass of water    Chronic bilateral low back pain with bilateral sciatica  Managed by pain management as well as spine surgery [facility]    Anxiety  Start back onto Zoloft 25 mg once daily as well as as needed alprazolam.  Reevaluate in 4 months.  He has been on both previously    Hyperlipidemia  Cholesterol remains well-controlled on Crestor 10 mg once daily    BMI 40.0-44.9, adult (HCC)  Weight loss will be beneficial.  Patient's insurance will not cover GLP-1 agonist.  I would not place  him on phentermine due to his history of hypertension as well as generalized anxiety disorder which will make things worse.          Problem List Items Addressed This Visit          Cardiovascular and Mediastinum    Benign essential hypertension    Hypertension remains well-controlled on Toprol XL 75 mg once daily.  Continue same.  No need to change.  Weight loss would be beneficial.  Patient is aware of this however his insurance does not cover GLP-1 agonist.  With his history of anxiety and hypertension I would not put him on phentermine         Relevant Medications    metoprolol succinate (TOPROL-XL) 50 mg 24 hr tablet    metoprolol succinate (TOPROL-XL) 25 mg 24 hr tablet    Other Relevant Orders    CBC and differential       Respiratory    Sleep apnea    Referral for home sleep study         Relevant Orders    Ambulatory Referral to Sleep Medicine       Endocrine    Hypothyroidism    Well-controlled on levothyroxine 112 mcg once daily.  Continue same.  Refill provided.  Repeat thyroid function testing to be done in June.  Advised patient that he should be taking levothyroxine on its own on an empty stomach with a full glass of water         Relevant Medications    levothyroxine 112 mcg tablet    metoprolol succinate (TOPROL-XL) 50 mg 24 hr tablet    metoprolol succinate (TOPROL-XL) 25 mg 24 hr tablet    Other Relevant Orders    TSH, 3rd generation with Free T4 reflex       Nervous and Auditory    Chronic bilateral low back pain with bilateral sciatica    Managed by pain management as well as spine surgery through [facility]            Behavioral Health    Anxiety    Start back onto Zoloft 25 mg once daily as well as as needed alprazolam.  Reevaluate in 4 months.  He has been on both previously         Relevant Medications    sertraline (ZOLOFT) 25 mg tablet       Other    RESOLVED: Abnormal blood sugar - Primary    BMI 40.0-44.9, adult (HCC)    Weight loss will be beneficial.  Patient's  insurance will not cover GLP-1 agonist.  I would not place him on phentermine due to his history of hypertension as well as generalized anxiety disorder which will make things worse.         Relevant Orders    Ambulatory Referral to Sleep Medicine    Dyslipidemia    Relevant Medications    rosuvastatin (CRESTOR) 10 MG tablet    Other Relevant Orders    Comprehensive metabolic panel    Lipid panel    Hyperlipidemia    Cholesterol remains well-controlled on Crestor 10 mg once daily         Relevant Medications    rosuvastatin (CRESTOR) 10 MG tablet     Other Visit Diagnoses         Prostate cancer screening        Relevant Orders    PSA, Total Screen    Ambulatory Referral to Urology

## 2025-02-25 NOTE — ASSESSMENT & PLAN NOTE
Weight loss will be beneficial.  Patient's insurance will not cover GLP-1 agonist.  I would not place him on phentermine due to his history of hypertension as well as generalized anxiety disorder which will make things worse.

## 2025-02-25 NOTE — ASSESSMENT & PLAN NOTE
Start back onto Zoloft 25 mg once daily as well as as needed alprazolam.  Reevaluate in 4 months.  He has been on both previously

## 2025-02-25 NOTE — ASSESSMENT & PLAN NOTE
Hypertension remains well-controlled on Toprol XL 75 mg once daily.  Continue same.  No need to change.  Weight loss would be beneficial.  Patient is aware of this however his insurance does not cover GLP-1 agonist.  With his history of anxiety and hypertension I would not put him on phentermine

## 2025-02-25 NOTE — ASSESSMENT & PLAN NOTE
Well-controlled on levothyroxine 112 mcg once daily.  Continue same.  Refill provided.  Repeat thyroid function testing to be done in June.  Advised patient that he should be taking levothyroxine on its own on an empty stomach with a full glass of water

## 2025-02-26 ENCOUNTER — TELEPHONE (OUTPATIENT)
Dept: SLEEP CENTER | Facility: CLINIC | Age: 55
End: 2025-02-26

## 2025-02-26 NOTE — TELEPHONE ENCOUNTER
Referral placed for a home sleep study. Note does not reflect discussion of symptoms listed on order.    Please addend note with discussion of symptoms.    Following a review of the Sleep Study/Sleep Consult ordering and insurance approval process, going forward, the Sleep Medicine Department is asking that the specific symptoms selected within the order for a sleep study and/or Sleep Medicine Consult (i.e. witnessed apneas, snoring, daytime sleepiness, etc.) be directly discussed (if even briefly) within the note from the face-to-face encounter. This is a slight change from what was asked in the past, but will help with proper compliance from a coding and insurance approval standpoint, ensuring that sleep studies are able to obtain insurance approval and that patients are then able to obtain the studies without difficulty. Thank you!     Ordering and co-signing providers notified.

## 2025-02-27 ENCOUNTER — TRANSCRIBE ORDERS (OUTPATIENT)
Dept: SLEEP CENTER | Facility: CLINIC | Age: 55
End: 2025-02-27

## 2025-02-27 DIAGNOSIS — G47.30 SLEEP APNEA, UNSPECIFIED TYPE: ICD-10-CM

## 2025-02-28 ENCOUNTER — TELEPHONE (OUTPATIENT)
Age: 55
End: 2025-02-28

## 2025-02-28 NOTE — TELEPHONE ENCOUNTER
New Patient    Appointment Scheduling  What office location does the patient prefer?: Abhijeet Sarmiento  What is the reason for the patient's appointment?: Prostate Cancer Screening  Have patient records been requested?:  If No, are the records showing in Epic: Yes    Appointment Details  Date: 4/3/25  Time:  3:00pm    Location: [facility]   Provider: Abhijeet Sarmiento  Does the appointment need further review? No    HISTORY  Is the patient having active symptoms? If so, describe symptoms: No  Has the patient had any previous Urologist(s)?: Abhijeet Sarmiento  Was the patient seen in the ED?: No  Has the patient had any outside testing done?: No  Does patient have Imaging/Lab Results:No  Have you had Cancer? No    INSURANCE   Have you confirmed Patient's insurance? Yes  Is the insurance accepted? Yes  Is the insurance active? Yes

## 2025-04-07 ENCOUNTER — TELEPHONE (OUTPATIENT)
Age: 55
End: 2025-04-07

## 2025-04-07 NOTE — TELEPHONE ENCOUNTER
I spoke with patient and he advised he is seeing his back specialist on Friday and then he will go from there. If he needs to follow up with Dr Naranjo he will call back

## 2025-04-07 NOTE — TELEPHONE ENCOUNTER
Patient called in requesting a referral to see and orthopedic specialist as he is having pain in joint where leg and groin connect. Patient stated the pain is getting worse and sometimes has trouble walking. Patient stated he had a spinal fusion done in 2023 and thinks it may be from that and he spoke with their office and was advised to see ortho. Patient asked if referral is able to be placed.   Patient also asked if Dr. Naranjo would be able to send meloxicam to pharmacy for the pain he is having as that had worked for him in the past.   Advised patient he may need to be seen for an appointment with Dr. Naranjo but declined for now.     Please advise patient if we can accommodate, thank you

## 2025-04-07 NOTE — TELEPHONE ENCOUNTER
"\" Pain and joint where leg and groin connect\"..... referring to his hip?  He can make an appointment to make determination, discussed where he would like to go and can also be evaluated prior to prescribing an NSAID for an unknown joint  "

## 2025-04-25 ENCOUNTER — TELEPHONE (OUTPATIENT)
Dept: UROLOGY | Facility: CLINIC | Age: 55
End: 2025-04-25

## 2025-04-25 NOTE — TELEPHONE ENCOUNTER
Left message for patient to return call. Need to reschedule his appointment with Abhijeet on 4/28.  He is scheduled in a follow up slot, needs to be new patient 40 min. Visit  I offered him 4/29 or 4/30 at 11:40  which is 40 mins.  Thank you  Can reach out to me if needed

## 2025-04-28 ENCOUNTER — OFFICE VISIT (OUTPATIENT)
Dept: UROLOGY | Facility: CLINIC | Age: 55
End: 2025-04-28

## 2025-04-28 VITALS
HEIGHT: 71 IN | DIASTOLIC BLOOD PRESSURE: 90 MMHG | OXYGEN SATURATION: 95 % | HEART RATE: 82 BPM | SYSTOLIC BLOOD PRESSURE: 140 MMHG | WEIGHT: 315 LBS | BODY MASS INDEX: 44.1 KG/M2

## 2025-04-28 DIAGNOSIS — R35.1 BPH ASSOCIATED WITH NOCTURIA: Primary | ICD-10-CM

## 2025-04-28 DIAGNOSIS — N40.1 BPH ASSOCIATED WITH NOCTURIA: Primary | ICD-10-CM

## 2025-04-28 DIAGNOSIS — N50.9 SCROTAL LESION: ICD-10-CM

## 2025-04-28 DIAGNOSIS — Z12.5 SCREENING FOR PROSTATE CANCER: ICD-10-CM

## 2025-04-28 NOTE — PROGRESS NOTES
Name: Evin Bran      : 1970      MRN: [id number]  Encounter Provider: Abhijeet Sarmiento PA-C  Encounter Date: 2025   Encounter department: [facility]  :  Assessment & Plan  BPH associated with nocturia  BPH with SE of RE on tamsulosin  Alfuzosin 10mg trial but stopped due to BPH symptoms not warranting medication.   -hold on medication unless urinary symptoms worse   - psa prior to exam        Scrotal lesion  Inguinal nodule  3-4mm soft nodular region in the superfcal skin in right scrotal inguinal region.   - scrotal US and f/u afterwards to confirm no adverse pathology      Orders:    US scrotum and testicles; Future    Screening for prostate cancer    Orders:    PSA, Total Screen; Future        History of Present Illness   Evin Bran is a 54 y.o. male who presents to [facility] care for evaluation of prostate.  Was previously on tamsulosin 0.4 mg for his BPH but stopped due to side effect of retrograde ejaculation.  When seen 2024 was placed on Alfuzosin 10 mg daily to help with voiding and possibly not have the side effect of retrograde ejaculation.  Stopped taking the Alfuzosin but stopped due to his BPH symptoms not troublesome enough  to continue with medication. Continues with fair stream and nocturia x 1.  Been trying to decrease his water consumption prior to be.   Pt noticed a bulge in the superior aspect of his right scrotum region for the past year.  Noticed no significant change in sizer pain.     AUA SYMPTOM SCORE      Flowsheet Row Most Recent Value   AUA SYMPTOM SCORE    How often have you had a sensation of not emptying your bladder completely after you finished urinating? 2 (P)     How often have you had to urinate again less than two hours after you finished urinating? 2 (P)     How often have you found you stopped and started again several times when you urinate? 1 (P)     How often have you found it difficult to postpone urination? 1 (P)    "  How often have you had a weak urinary stream? 1 (P)     How often have you had to push or strain to begin urination? 0 (P)     How many times did you most typically get up to urinate from the time you went to bed at night until the time you got up in the morning? 3 (P)     Quality of Life: If you were to spend the rest of your life with your urinary condition just the way it is now, how would you feel about that? 2 (P)     AUA SYMPTOM SCORE 10 (P)            Review of Systems       Objective   /90 (BP Location: Left arm, Patient Position: Sitting, Cuff Size: Adult)   Pulse 82   Ht 5' 11\" (1.803 m)   Wt (!) 147 kg (325 lb)   SpO2 95%   BMI 45.33 kg/m²     Physical Exam  Pulmonary:      Effort: Pulmonary effort is normal.   Genitourinary:         Comments: Prostate  Grade II-III  Soft, NT  No nodules     3-4mm soft nodular region in the superfcal skin in right scrotal inguinal region.   Skin:     General: Skin is warm.   Neurological:      Mental Status: He is alert.   Psychiatric:         Mood and Affect: Mood normal.          Results   Lab Results   Component Value Date    PSA 0.6 12/30/2021    PSA 0.5 07/24/2018     Lab Results   Component Value Date    CALCIUM 8.6 10/12/2024     12/10/2016    K 4.2 10/12/2024    CO2 27 10/12/2024     10/12/2024    BUN 19 10/12/2024    CREATININE 0.81 10/12/2024     Lab Results   Component Value Date    WBC 6.4 07/24/2018    HGB 14.9 07/24/2018    HCT 43.9 07/24/2018    MCV 90 07/24/2018     07/24/2018       Office Urine Dip  No results found for this or any previous visit (from the past hour).         Radiology      Labs   07/24/2018 PSA 0.5  02/23/2019 testosterone 385  12/30/2021 PSA 0.6  04/29/2023 PSA 0.63    Past Medical History  Hypertension  Hypothyroid  Sleep apnea  L4-L5 hemilaminectomy 03/28/22 Dr. Yordy Alvarez in [place]     Past  History  BPH retrograde ejaculation with tamsulosin 0.4 mg    Past  surgical history       Prior " Visits  03/08/24  53 y.o. male with hx urgency/frequency placed on tadalafil 2.5mg daily 03/07/23 and developed back pain. Told to stop the tadalafil and try tamsulosin 0.4mg daily. Presents for f/u. Notes while on the tamsulosin 0.4mg his urinary stream and urgency improved, but stopped due to RE.       Genitourinary:  Comments: Prostate   Grade II-III  Soft, NT  No nodules   Neurological:     Assessment:   BPH helped with tamsulosin 0.4mg but has SE of RE    Plan:  Patient Instructions   F/u 1 year uroflow/PVR or sooner if having increasing urinary issues   Alfuzosin 10mg daily nightly with food. SED: postural hypotension, RE,  PSA May 2024 and call with results.   If has SE with Alfuzosin 10mg may consider a trial of Rapaflo       Abhijeet Sarmiento, [facility]rology [facility]

## 2025-05-01 ENCOUNTER — HOSPITAL ENCOUNTER (OUTPATIENT)
Dept: ULTRASOUND IMAGING | Facility: HOSPITAL | Age: 55
Discharge: HOME | End: 2025-05-01
Attending: PHYSICIAN ASSISTANT
Payer: COMMERCIAL

## 2025-05-01 DIAGNOSIS — N50.9 SCROTAL LESION: ICD-10-CM

## 2025-05-01 PROCEDURE — 76870 US EXAM SCROTUM: CPT

## 2025-06-30 ENCOUNTER — OFFICE VISIT (OUTPATIENT)
Dept: FAMILY MEDICINE CLINIC | Facility: CLINIC | Age: 55
End: 2025-06-30
Payer: COMMERCIAL

## 2025-06-30 VITALS
DIASTOLIC BLOOD PRESSURE: 88 MMHG | HEART RATE: 80 BPM | RESPIRATION RATE: 18 BRPM | OXYGEN SATURATION: 98 % | HEIGHT: 71 IN | WEIGHT: 315 LBS | SYSTOLIC BLOOD PRESSURE: 136 MMHG | BODY MASS INDEX: 44.1 KG/M2

## 2025-06-30 DIAGNOSIS — E66.813 CLASS 3 SEVERE OBESITY DUE TO EXCESS CALORIES WITH SERIOUS COMORBIDITY IN ADULT: Primary | ICD-10-CM

## 2025-06-30 PROCEDURE — 99213 OFFICE O/P EST LOW 20 MIN: CPT | Performed by: FAMILY MEDICINE

## 2025-06-30 NOTE — ASSESSMENT & PLAN NOTE
Reviewed side effects of GLP-1 agonist.  Patient is definitely interested.  If it is not covered by his insurance then he will go through online program.

## 2025-06-30 NOTE — PROGRESS NOTES
"  Subjective:      Patient ID: Evin Bran is a 54 y.o. male.    54-year-old male interested in weight loss therapy with GLP-1.  Prior insurance did not cover.  Gets new insurance tomorrow.  Will contact insurance to see if covered.  Also interested that if it is not covered if he can get online program and prescriptions.  He is trying to exercise more and watch his diet        Past Medical History[1]    Family History[2]    Past Surgical History[3]     reports that he has never smoked. He has never used smokeless tobacco. He reports current alcohol use. He reports that he does not use drugs.    Current Medications[4]    The following portions of the patient's history were reviewed and updated as appropriate: allergies, current medications, past family history, past medical history, past social history, past surgical history and problem list.    Review of Systems   Constitutional: Negative.    Musculoskeletal:  Positive for arthralgias and back pain.           Objective:    /88   Pulse 80   Resp 18   Ht 5' 11\" (1.803 m)   Wt (!) 144 kg (317 lb)   SpO2 98%   BMI 44.21 kg/m²      Physical Exam  Vitals and nursing note reviewed.   Constitutional:       Appearance: Normal appearance. He is obese.     Cardiovascular:      Rate and Rhythm: Normal rate and regular rhythm.     Neurological:      Mental Status: He is alert.           No results found for this or any previous visit (from the past 6 weeks).    Assessment/Plan:    BMI 40.0-44.9, adult (HCC)  Weight loss would be beneficial.  Patient's prior insurance did not cover GLP-1 agonist.  New insurance may cover.  He will check.  If it is covered he will contact me through [employer]    Class 3 severe obesity due to excess calories with serious comorbidity in adult (HCC)  Reviewed side effects of GLP-1 agonist.  Patient is definitely interested.  If it is not covered by his insurance then he will go through online program.          Problem List Items Addressed " This Visit        Other    BMI 40.0-44.9, adult (HCC)    Weight loss would be beneficial.  Patient's prior insurance did not cover GLP-1 agonist.  New insurance may cover.  He will check.  If it is covered he will contact me through [employer]         Class 3 severe obesity due to excess calories with serious comorbidity in adult - Primary    Reviewed side effects of GLP-1 agonist.  Patient is definitely interested.  If it is not covered by his insurance then he will go through online program.                   [1]  Past Medical History:  Diagnosis Date   • Colon polyp    • Disease of thyroid gland    • Hypertension    [2]  Family History  Problem Relation Name Age of Onset   • Emphysema Mother Sandrine    • Bone cancer Mother Sandrine    • Breast cancer Mother Sandrine    • Lung cancer Mother Sandrine    • COPD Mother Sandrine    • Dementia Father Pedro Pablo    • Stroke Father Pedro Pablo    • Hyperlipidemia Father Pedro Pablo    • Hypertension Father Pedro Pablo    • Diabetes type II Father Pedro Pablo    • Diabetes Father Pedro Pablo    • Thyroid cancer Sister     [3]  Past Surgical History:  Procedure Laterality Date   • COLONOSCOPY     • EPIDURAL BLOCK INJECTION N/A 09/03/2021    Procedure: L4 L5 lumbar epidural steroid injection (98511);  Surgeon: Linda Arvizu MD;  Location: [facility] MAIN OR;  Service: Pain Management    • EPIDURAL BLOCK INJECTION N/A 11/24/2021    Procedure: L4 L5 lumbar epidural steroid injection (47618);  Surgeon: Linda Arvizu MD;  Location: [facility] MAIN OR;  Service: Pain Management    • EPIDURAL BLOCK INJECTION N/A 03/04/2022    Procedure: L4 L5 lumbar epidural steroid injection (94711);  Surgeon: Linda Arvizu MD;  Location: [facility] MAIN OR;  Service: Pain Management    • LUMBAR FUSION  06/01/2023   • SD INJECT SI JOINT ARTHRGRPHY&/ANES/STEROID W/LAWSON Bilateral 08/13/2021    Procedure: SACROILIAC JOINT INJECTION (24090);  Surgeon: Linda Arvizu MD;  Location: [facility] MAIN OR;  Service: Pain Management    [4]    Current Outpatient Medications:   •   ALPRAZolam (XANAX) 0.25 mg tablet, Take 1 tablet (0.25 mg total) by mouth daily at bedtime as needed for anxiety, Disp: 30 tablet, Rfl: 0  •  levothyroxine 112 mcg tablet, Take 1 tablet (112 mcg total) by mouth daily in the early morning, Disp: 90 tablet, Rfl: 1  •  metoprolol succinate (TOPROL-XL) 25 mg 24 hr tablet, Take 1 tablet (25 mg total) by mouth daily, Disp: 90 tablet, Rfl: 1  •  metoprolol succinate (TOPROL-XL) 50 mg 24 hr tablet, Take 1 tablet (50 mg total) by mouth daily, Disp: 90 tablet, Rfl: 1  •  rosuvastatin (CRESTOR) 10 MG tablet, Take 1 tablet (10 mg total) by mouth daily, Disp: 90 tablet, Rfl: 1  •  sertraline (ZOLOFT) 25 mg tablet, Take 1 tablet (25 mg total) by mouth daily, Disp: 90 tablet, Rfl: 1

## 2025-06-30 NOTE — ASSESSMENT & PLAN NOTE
Weight loss would be beneficial.  Patient's prior insurance did not cover GLP-1 agonist.  New insurance may cover.  He will check.  If it is covered he will contact me through [employer]

## 2025-07-12 LAB
ALBUMIN SERPL-MCNC: 4.1 G/DL (ref 3.5–5.7)
ALP SERPL-CCNC: 77 U/L (ref 35–120)
ALT SERPL-CCNC: 29 U/L
ANION GAP SERPL CALCULATED.3IONS-SCNC: 5 MMOL/L (ref 3–11)
AST SERPL-CCNC: 19 U/L
BASOPHILS # BLD AUTO: 0 THOU/CMM (ref 0–0.1)
BASOPHILS NFR BLD AUTO: 0 %
BILIRUB SERPL-MCNC: 0.8 MG/DL (ref 0.2–1)
BUN SERPL-MCNC: 17 MG/DL (ref 7–28)
CALCIUM SERPL-MCNC: 8.9 MG/DL (ref 8.5–10.5)
CHLORIDE SERPL-SCNC: 103 MMOL/L (ref 100–109)
CHOLEST SERPL-MCNC: 150 MG/DL
CHOLEST/HDLC SERPL: 3.8 {RATIO}
CO2 SERPL-SCNC: 31 MMOL/L (ref 21–31)
CREAT SERPL-MCNC: 0.89 MG/DL (ref 0.53–1.3)
CYTOLOGY CMNT CVX/VAG CYTO-IMP: NORMAL
DIFFERENTIAL METHOD BLD: NORMAL
EOSINOPHIL # BLD AUTO: 0.1 THOU/CMM (ref 0–0.5)
EOSINOPHIL NFR BLD AUTO: 2 %
ERYTHROCYTE [DISTWIDTH] IN BLOOD BY AUTOMATED COUNT: 12.9 % (ref 12–16)
GFR/BSA.PRED SERPLBLD CYS-BASED-ARV: 100 ML/MIN/{1.73_M2}
GLUCOSE SERPL-MCNC: 92 MG/DL (ref 65–99)
HCT VFR BLD AUTO: 44.9 % (ref 37–48)
HDLC SERPL-MCNC: 39 MG/DL (ref 23–92)
HGB BLD-MCNC: 15.4 G/DL (ref 12.5–17)
LDLC SERPL CALC-MCNC: 95 MG/DL
LYMPHOCYTES # BLD AUTO: 1.9 THOU/CMM (ref 1–3)
LYMPHOCYTES NFR BLD AUTO: 28 %
MCH RBC QN AUTO: 31.4 PG (ref 27–36)
MCHC RBC AUTO-ENTMCNC: 34.4 G/DL (ref 32–37)
MCV RBC AUTO: 91 FL (ref 80–100)
MONOCYTES # BLD AUTO: 0.4 THOU/CMM (ref 0.3–1)
MONOCYTES NFR BLD AUTO: 6 %
NEUTROPHILS # BLD AUTO: 4.2 THOU/CMM (ref 1.8–7.8)
NEUTROPHILS NFR BLD AUTO: 64 %
NONHDLC SERPL-MCNC: 111 MG/DL
PLATELET # BLD AUTO: 235 THOU/CMM (ref 140–350)
PMV BLD REES-ECKER: 8 FL (ref 7.5–11.3)
POTASSIUM SERPL-SCNC: 4.3 MMOL/L (ref 3.5–5.2)
PROT SERPL-MCNC: 6.7 G/DL (ref 6.3–8.3)
PSA SERPL-MCNC: 3.69 NG/ML
RBC # BLD AUTO: 4.91 MILL/CMM (ref 4–5.4)
SODIUM SERPL-SCNC: 139 MMOL/L (ref 135–145)
TRIGL SERPL-MCNC: 80 MG/DL
TSH SERPL-ACNC: 1.55 UIU/ML (ref 0.45–5.33)
WBC # BLD AUTO: 6.6 THOU/CMM (ref 4–10.5)

## 2025-07-22 ENCOUNTER — PATIENT MESSAGE (OUTPATIENT)
Dept: UROLOGY | Facility: CLINIC | Age: 55
End: 2025-07-22

## 2025-07-22 NOTE — PATIENT COMMUNICATION
Please review/advise    Component  Ref Range & Units (hover) 7/12/25 0804 12/30/21 1029 7/24/18 0717   Prostate Specific Antigen Total 3.69 0.6 R, CM 0.5 R, CM   Comment: Testing performed using [email or web address]. Result       Note from PCP on chart    Please call the patient regarding his abnormal result.  His screening labs are essentially normal.  PSA compared to 3 years ago has had a substantial rise.  Difficult to quantify because it was not a yearly test.  I see that he is scheduled with [facility] urology to establish in August.  Definitely recommend keeping that appointment.  Remainder of labs should be repeated in 1 year   Written by Peter Naranjo DO on 7/14/2025  1:06 PM EDT  Seen by patient Evin Bran on 7/14/2025  1:27 PM       Encounters Related to Results    7/14/2025 Results Follow-Up  2/24/2025 Office Visit (Orderin

## 2025-07-24 ENCOUNTER — TELEPHONE (OUTPATIENT)
Dept: CARDIOLOGY CLINIC | Facility: CLINIC | Age: 55
End: 2025-07-24

## 2025-07-24 NOTE — TELEPHONE ENCOUNTER
7/24/25 [facility] for patient to call back to schedule an overdue follow up with [facility] or Dr Liang. Per Dr Liang

## 2025-07-28 DIAGNOSIS — R97.20 ELEVATED PSA: Primary | ICD-10-CM

## 2025-07-30 DIAGNOSIS — E66.813 CLASS 3 SEVERE OBESITY WITHOUT SERIOUS COMORBIDITY WITH BODY MASS INDEX (BMI) OF 40.0 TO 44.9 IN ADULT, UNSPECIFIED OBESITY TYPE: ICD-10-CM

## 2025-07-30 DIAGNOSIS — E66.813 CLASS 3 SEVERE OBESITY DUE TO EXCESS CALORIES WITH SERIOUS COMORBIDITY IN ADULT: Primary | ICD-10-CM

## 2025-07-30 RX ORDER — TIRZEPATIDE 2.5 MG/.5ML
2.5 INJECTION, SOLUTION SUBCUTANEOUS WEEKLY
Qty: 2 ML | Refills: 0 | Status: SHIPPED | OUTPATIENT
Start: 2025-07-30

## 2025-08-07 ENCOUNTER — OFFICE VISIT (OUTPATIENT)
Dept: CARDIOLOGY CLINIC | Facility: CLINIC | Age: 55
End: 2025-08-07
Payer: COMMERCIAL

## 2025-08-07 VITALS
OXYGEN SATURATION: 98 % | TEMPERATURE: 98.7 F | HEART RATE: 68 BPM | BODY MASS INDEX: 43.82 KG/M2 | HEIGHT: 71 IN | WEIGHT: 313 LBS | DIASTOLIC BLOOD PRESSURE: 87 MMHG | SYSTOLIC BLOOD PRESSURE: 124 MMHG

## 2025-08-07 DIAGNOSIS — I10 BENIGN ESSENTIAL HYPERTENSION: ICD-10-CM

## 2025-08-07 DIAGNOSIS — E78.5 DYSLIPIDEMIA: ICD-10-CM

## 2025-08-07 DIAGNOSIS — Z82.49 FAMILY HISTORY OF PREMATURE CAD: ICD-10-CM

## 2025-08-07 DIAGNOSIS — R07.89 OTHER CHEST PAIN: ICD-10-CM

## 2025-08-07 PROCEDURE — 99214 OFFICE O/P EST MOD 30 MIN: CPT

## 2025-08-07 RX ORDER — MELOXICAM 15 MG/1
1 TABLET ORAL DAILY
COMMUNITY
Start: 2025-08-01

## (undated) DEVICE — RADIOLOGY STERILE LABELS: Brand: CENTURION

## (undated) DEVICE — PLASTIC ADHESIVE BANDAGE: Brand: CURITY

## (undated) DEVICE — SYRINGE 5ML LL

## (undated) DEVICE — TOWEL SET X-RAY

## (undated) DEVICE — GLOVE SRG BIOGEL 7.5

## (undated) DEVICE — BRACHIAL PLEXUS SET

## (undated) DEVICE — CHLORAPREP APPLICATOR TINTED 10.5ML ONE-STEP

## (undated) DEVICE — 18G X 6"(152MM)PLASTIC HUBWITH WINGS, CALIBRATIONS: Brand: TUOHY EPIDURAL NEEDLE

## (undated) DEVICE — NEEDLE SPINAL 22G X 5IN QUINCKE

## (undated) DEVICE — TRAY EPIDURAL PERIFIX 20GA X 3.5IN TUOHY 8ML

## (undated) DEVICE — NEEDLE TUOHY 20G X 3.5 IN WINGED

## (undated) DEVICE — NEEDLE BLUNT 18 G X 1 1/2 W FILTER

## (undated) DEVICE — FLEXIBLE ADHESIVE BANDAGE,X-LARGE: Brand: CURITY

## (undated) DEVICE — DRESSING GUAZE ADH BORDER 4 X 4 IN

## (undated) DEVICE — SMALL NEEDLE COUNTER NEST

## (undated) DEVICE — 3M™ TEGADERM™ TRANSPARENT FILM DRESSING FRAME STYLE, 1626W, 4 IN X 4-3/4 IN (10 CM X 12 CM), 50/CT 4CT/CASE: Brand: 3M™ TEGADERM™

## (undated) DEVICE — WIPES BABY PAMPERS SENSITIVE 36/PK

## (undated) DEVICE — NEEDLE SPINAL 22G X 3.5IN  QUINCKE